# Patient Record
Sex: MALE | Race: WHITE | NOT HISPANIC OR LATINO | Employment: FULL TIME | URBAN - METROPOLITAN AREA
[De-identification: names, ages, dates, MRNs, and addresses within clinical notes are randomized per-mention and may not be internally consistent; named-entity substitution may affect disease eponyms.]

---

## 2017-02-18 ENCOUNTER — GENERIC CONVERSION - ENCOUNTER (OUTPATIENT)
Dept: OTHER | Facility: OTHER | Age: 57
End: 2017-02-18

## 2017-11-24 ENCOUNTER — GENERIC CONVERSION - ENCOUNTER (OUTPATIENT)
Dept: OTHER | Facility: OTHER | Age: 57
End: 2017-11-24

## 2018-01-10 NOTE — MISCELLANEOUS
Message  GI Reminder Recall [facility]:   Date: 11/24/2017   Dear Marshall Mosher:     Review of our records shows you are due for the following: Colonoscopy  Our records indicate that you are due at this time to have a follow-up examination for a colonoscopy  As you now, these tests are done to prevent colon cancer, a very common disease in the [place] and responsible for the thousands of patient deaths each year  We at [facility] are concerned for your health, and would very much appreciate you getting in touch with us at your earliest convenience, Again, this examination is vital to your proper health maintenance and for the prevention of cancer  Please call the following office to schedule your appointment:   [place] [id number], [facility], [address] [place] [address] [phone]  We look forward to hearing from you!      Sincerely,     [facility]      Signatures   Electronically signed by : Gomez Balderas, ; Nov 24 2017  1:55PM EST                       (Author)

## 2018-01-15 NOTE — MISCELLANEOUS
Message  GI Reminder Recall Jovani Dopp:   Date: 02/18/2017   Dear Mackenzie Ta:     Review of our records shows you are due for the following: Colonoscopy  Please call the following office to schedule your appointment:   [facility], [address], [address] [place] [address]  [phone]  We look forward to hearing from you!      Sincerely,     Dr Callum Langley   Electronically signed by : Abdulaziz Gross, ; Feb 18 2017 11:21AM EST                       (Author)

## 2019-07-18 ENCOUNTER — TRANSCRIBE ORDERS (OUTPATIENT)
Dept: ADMINISTRATIVE | Facility: HOSPITAL | Age: 59
End: 2019-07-18

## 2019-07-18 DIAGNOSIS — J98.4 DISEASE OF LUNG: Primary | ICD-10-CM

## 2019-07-20 ENCOUNTER — HOSPITAL ENCOUNTER (OUTPATIENT)
Dept: RADIOLOGY | Facility: HOSPITAL | Age: 59
Discharge: HOME | End: 2019-07-20
Payer: COMMERCIAL

## 2019-07-20 DIAGNOSIS — J98.4 DISEASE OF LUNG: ICD-10-CM

## 2019-07-25 ENCOUNTER — HOSPITAL ENCOUNTER (OUTPATIENT)
Dept: NON INVASIVE DIAGNOSTICS | Facility: HOSPITAL | Age: 59
Discharge: HOME | End: 2019-07-25
Payer: COMMERCIAL

## 2019-07-25 ENCOUNTER — TRANSCRIBE ORDERS (OUTPATIENT)
Dept: ADMINISTRATIVE | Facility: HOSPITAL | Age: 59
End: 2019-07-25

## 2019-07-25 DIAGNOSIS — R79.89 D-DIMER, ELEVATED: ICD-10-CM

## 2019-07-25 DIAGNOSIS — R91.8 PULMONARY NODULES: ICD-10-CM

## 2019-07-25 DIAGNOSIS — M31.30 WEGENER'S GRANULOMATOSIS WITHOUT RENAL INVOLVEMENT (HCC): ICD-10-CM

## 2019-07-25 DIAGNOSIS — M31.30 WEGENER'S GRANULOMATOSIS WITHOUT RENAL INVOLVEMENT (HCC): Primary | ICD-10-CM

## 2019-07-25 PROCEDURE — 93306 TTE W/DOPPLER COMPLETE: CPT

## 2019-07-25 PROCEDURE — 93306 TTE W/DOPPLER COMPLETE: CPT | Performed by: INTERNAL MEDICINE

## 2019-08-02 ENCOUNTER — HOSPITAL ENCOUNTER (OUTPATIENT)
Dept: NON INVASIVE DIAGNOSTICS | Facility: CLINIC | Age: 59
Discharge: HOME | End: 2019-08-02
Payer: COMMERCIAL

## 2019-08-02 DIAGNOSIS — R91.8 PULMONARY NODULES: ICD-10-CM

## 2019-08-02 DIAGNOSIS — R79.89 D-DIMER, ELEVATED: ICD-10-CM

## 2019-08-02 PROCEDURE — 93970 EXTREMITY STUDY: CPT

## 2019-08-02 PROCEDURE — 93970 EXTREMITY STUDY: CPT | Performed by: SURGERY

## 2019-08-05 ENCOUNTER — TRANSCRIBE ORDERS (OUTPATIENT)
Dept: ADMINISTRATIVE | Facility: HOSPITAL | Age: 59
End: 2019-08-05

## 2019-08-05 DIAGNOSIS — R91.8 LUNG NODULES: Primary | ICD-10-CM

## 2019-10-07 ENCOUNTER — HOSPITAL ENCOUNTER (OUTPATIENT)
Dept: RADIOLOGY | Facility: HOSPITAL | Age: 59
Discharge: HOME | End: 2019-10-07
Payer: COMMERCIAL

## 2019-10-07 DIAGNOSIS — R91.8 LUNG NODULES: ICD-10-CM

## 2019-10-07 PROCEDURE — 71250 CT THORAX DX C-: CPT

## 2020-01-30 ENCOUNTER — TRANSCRIBE ORDERS (OUTPATIENT)
Dept: ADMINISTRATIVE | Facility: HOSPITAL | Age: 60
End: 2020-01-30

## 2020-01-30 DIAGNOSIS — R91.1 PULMONARY NODULE: Primary | ICD-10-CM

## 2020-01-30 DIAGNOSIS — Z09 FOLLOW UP: ICD-10-CM

## 2020-07-06 ENCOUNTER — TRANSCRIBE ORDERS (OUTPATIENT)
Dept: ADMINISTRATIVE | Facility: HOSPITAL | Age: 60
End: 2020-07-06

## 2020-07-06 DIAGNOSIS — D14.30 BENIGN NEOPLASM OF BRONCHUS AND LUNG: Primary | ICD-10-CM

## 2020-07-06 DIAGNOSIS — K86.89 PANCREATIC FISTULA: ICD-10-CM

## 2020-07-10 ENCOUNTER — HOSPITAL ENCOUNTER (OUTPATIENT)
Dept: ULTRASOUND IMAGING | Facility: HOSPITAL | Age: 60
Discharge: HOME | End: 2020-07-10
Payer: COMMERCIAL

## 2020-07-10 ENCOUNTER — APPOINTMENT (OUTPATIENT)
Dept: CT IMAGING | Facility: HOSPITAL | Age: 60
End: 2020-07-10
Payer: COMMERCIAL

## 2020-07-10 DIAGNOSIS — K86.89 PANCREATIC FISTULA: ICD-10-CM

## 2020-07-10 DIAGNOSIS — R10.13 EPIGASTRIC PAIN: ICD-10-CM

## 2020-07-10 PROCEDURE — 76700 US EXAM ABDOM COMPLETE: CPT

## 2021-01-13 ENCOUNTER — OFFICE VISIT (OUTPATIENT)
Dept: FAMILY MEDICINE CLINIC | Facility: CLINIC | Age: 61
End: 2021-01-13
Payer: COMMERCIAL

## 2021-01-13 VITALS
DIASTOLIC BLOOD PRESSURE: 70 MMHG | TEMPERATURE: 97.1 F | SYSTOLIC BLOOD PRESSURE: 120 MMHG | RESPIRATION RATE: 14 BRPM | HEIGHT: 69 IN | HEART RATE: 76 BPM | BODY MASS INDEX: 29.62 KG/M2 | WEIGHT: 200 LBS

## 2021-01-13 DIAGNOSIS — R91.8 MULTIPLE PULMONARY NODULES: ICD-10-CM

## 2021-01-13 DIAGNOSIS — E11.9 TYPE 2 DIABETES MELLITUS WITHOUT COMPLICATION, WITH LONG-TERM CURRENT USE OF INSULIN (HCC): ICD-10-CM

## 2021-01-13 DIAGNOSIS — F17.209 TOBACCO USE DISORDER, CONTINUOUS: ICD-10-CM

## 2021-01-13 DIAGNOSIS — Z90.81 HISTORY OF PARTIAL SPLENECTOMY: ICD-10-CM

## 2021-01-13 DIAGNOSIS — M31.30 GRANULOMATOSIS WITH POLYANGIITIS, UNSPECIFIED WHETHER RENAL INVOLVEMENT (HCC): ICD-10-CM

## 2021-01-13 DIAGNOSIS — L02.93 RECURRENT BOILS: ICD-10-CM

## 2021-01-13 DIAGNOSIS — E66.3 OVERWEIGHT WITH BODY MASS INDEX (BMI) OF 29 TO 29.9 IN ADULT: ICD-10-CM

## 2021-01-13 DIAGNOSIS — Z79.4 TYPE 2 DIABETES MELLITUS WITHOUT COMPLICATION, WITH LONG-TERM CURRENT USE OF INSULIN (HCC): ICD-10-CM

## 2021-01-13 DIAGNOSIS — E78.5 HYPERLIPIDEMIA, UNSPECIFIED HYPERLIPIDEMIA TYPE: ICD-10-CM

## 2021-01-13 DIAGNOSIS — I10 ESSENTIAL HYPERTENSION: Primary | ICD-10-CM

## 2021-01-13 PROBLEM — L02.92 RECURRENT BOILS: Status: ACTIVE | Noted: 2021-01-13

## 2021-01-13 PROCEDURE — 99243 OFF/OP CNSLTJ NEW/EST LOW 30: CPT | Performed by: FAMILY MEDICINE

## 2021-01-13 RX ORDER — INSULIN GLARGINE 100 [IU]/ML
10 INJECTION, SOLUTION SUBCUTANEOUS DAILY
COMMUNITY
End: 2022-07-08 | Stop reason: ALTCHOICE

## 2021-01-13 RX ORDER — EMPAGLIFLOZIN 25 MG/1
25 TABLET, FILM COATED ORAL DAILY
COMMUNITY
Start: 2020-12-14 | End: 2021-01-13 | Stop reason: SDUPTHER

## 2021-01-13 RX ORDER — ATORVASTATIN CALCIUM 20 MG/1
20 TABLET, FILM COATED ORAL DAILY
COMMUNITY
Start: 2020-12-10 | End: 2021-01-13 | Stop reason: SDUPTHER

## 2021-01-13 RX ORDER — ATORVASTATIN CALCIUM 20 MG/1
20 TABLET, FILM COATED ORAL DAILY
Qty: 90 TABLET | Refills: 4 | Status: SHIPPED | OUTPATIENT
Start: 2021-01-13 | End: 2022-04-01 | Stop reason: SDUPTHER

## 2021-01-13 RX ORDER — LISINOPRIL 40 MG/1
TABLET ORAL DAILY
COMMUNITY
End: 2022-07-08 | Stop reason: SDUPTHER

## 2021-01-13 RX ORDER — AMLODIPINE BESYLATE 10 MG/1
TABLET ORAL
COMMUNITY
End: 2021-01-13 | Stop reason: SDUPTHER

## 2021-01-13 RX ORDER — METOPROLOL TARTRATE 50 MG/1
50 TABLET, FILM COATED ORAL DAILY
COMMUNITY
End: 2021-02-22 | Stop reason: SDUPTHER

## 2021-01-13 RX ORDER — AMLODIPINE BESYLATE 10 MG/1
10 TABLET ORAL DAILY
Qty: 90 TABLET | Refills: 1 | Status: SHIPPED | OUTPATIENT
Start: 2021-01-13 | End: 2021-07-19 | Stop reason: SDUPTHER

## 2021-01-13 RX ORDER — EMPAGLIFLOZIN 25 MG/1
25 TABLET, FILM COATED ORAL DAILY
Qty: 90 TABLET | Refills: 1 | Status: SHIPPED | OUTPATIENT
Start: 2021-01-13 | End: 2021-07-28 | Stop reason: SDUPTHER

## 2021-01-13 NOTE — ASSESSMENT & PLAN NOTE
Reports most recent A1c is less than 7, will work to obtain previous results from former provider  Given that he sounds to be well controlled and fasting blood sugars are in acceptable range will have him decrease Lantus from 30 units to 25 units in the morning to see if he maintains adequate glycemic control with lower insulin  If possible he would probably benefit from additional oral agents instead of being on insulin, especially since he is attempting to lose weight  Would consider adding a sulfonylurea to his regimen, further reduction in insulin, pending repeat  A1c

## 2021-01-13 NOTE — ASSESSMENT & PLAN NOTE
Patient unaware of this diagnosis there is listed on several diagnoses on imaging studies done in the last several years  Will work to obtain previous records and lab studies

## 2021-01-13 NOTE — ASSESSMENT & PLAN NOTE
Patient reports history of frequent skin boils, has had these since his teenage years  Recommended Hibiclens baths  Consideration of mupirocin for MRSA decolonization

## 2021-01-13 NOTE — ASSESSMENT & PLAN NOTE
Patient has history of splenectomy though a recent abdominal ultrasound showed spleen was present, likely a splenic remnant is it measures slightly smaller than a normal spleen size  Patient reports he has had all needed vaccinations  Will work to obtain records of these

## 2021-01-13 NOTE — PROGRESS NOTES
FAMILY MEDICINE NEW PATIENT     Date of Service: 21  Primary Care Provider:   Jenise Alvarez MD     Name: Kaylee Bañuelos       : 1960       Age:60 y o  Sex: male      MRN: [id number]      Chief Complaint:New Patient Visit     ASSESSMENT and PLAN:  Kaylee Bañuelos is a 56 y o  male here to establish care with:     Problem List Items Addressed This Visit        Endocrine    Type 2 diabetes mellitus, with long-term current use of insulin ([address] )     Reports most recent A1c is less than 7, will work to obtain previous results from former provider  Given that he sounds to be well controlled and fasting blood sugars are in acceptable range will have him decrease Lantus from 30 units to 25 units in the morning to see if he maintains adequate glycemic control with lower insulin  If possible he would probably benefit from additional oral agents instead of being on insulin, especially since he is attempting to lose weight  Would consider adding a sulfonylurea to his regimen, further reduction in insulin, pending repeat  A1c  Relevant Medications    insulin glargine (Lantus SoloStar) 100 units/mL injection pen    atorvastatin (LIPITOR) 20 mg tablet    Jardiance 25 MG TABS    metFORMIN (GLUCOPHAGE) 1000 MG tablet       Cardiovascular and Mediastinum    Granulomatosis with polyangiitis [facility])     Patient unaware of this diagnosis there is listed on several diagnoses on imaging studies done in the last several years  Will work to obtain previous records and lab studies           Essential hypertension - Primary     BP Readings from Last 3 Encounters:   21 120/70     Blood pressure is controlled on current regimen with amlodipine 10 mg, lisinopril 40 mg, metoprolol 50 mg (two 25 mg in the evening)          Relevant Medications    lisinopril (ZESTRIL) 40 mg tablet    metoprolol tartrate (LOPRESSOR) 50 mg tablet    amLODIPine (NORVASC) 10 mg tablet    atorvastatin (LIPITOR) 20 mg tablet       Other Multiple pulmonary nodules     Patient has significant smoking history, has yearly CT scans done of his lungs, most recent October 2019 with numerous nodules in the upper lobes and right middle and lower lobes  Nodules have some irregular margins with surrounding ground-glass haziness  Compared to previous CT scan some of the nodules have resolved well as have increased in size  It sounds like nodules have had a waxing and waning appearance  Will plan to repeat CT scan  Overweight with body mass index (BMI) of 29 to 29 9 in adult    Tobacco use disorder, continuous     Patient has significant smoking history, smoking at least a pack a day for the last 45+ years  He wants to quit but is not ready to quit at this time  His goals to try to work towards quitting smoking in 2021  History of partial splenectomy     Patient has history of splenectomy though a recent abdominal ultrasound showed spleen was present, likely a splenic remnant is it measures slightly smaller than a normal spleen size  Patient reports he has had all needed vaccinations  Will work to obtain records of these  Recurrent boils     Patient reports history of frequent skin boils, has had these since his teenage years  Recommended Hibiclens baths  Consideration of mupirocin for MRSA decolonization  Other Visit Diagnoses     Hyperlipidemia, unspecified hyperlipidemia type        Relevant Medications    atorvastatin (LIPITOR) 20 mg tablet           SUBJECTIVE:  Julianna Parks is a 56 y o  male who presents today with a chief complaint of New Patient Visit  HPI     Diabetes  He is currently on metformin 1000 mg twice daily, Lantus 30U daily in the morning, Jardiance 25 mg  He is on an ACE and statin  He reports that his A1C has been below 7, last was done in September or October  He reports that fasting blood sugars have been   He has not had recent symptomatic low blood sugars   He has had an A1C over 10 in the past  He was diagnosed at least 10 years ago  Hypertension  He is currently on amlodipine 10 mg, lisinopril 40 mg, metoprolol 50 mg daily  He does not check his blood pressure regularly at home  Tobacco Use  He smokes 1 + pack per day  He has been smoking for 48 years  He thinks about quitting  He has been getting yearly CT scans for lung cancer screening  He was seeing pulmonology in the past      Pulmonary Nodules  He denies history of Granulomatosis with polyangiitis, though this is listed on diagnosis for several imaging studies including CT scans of the chest  He has seen pulmonology in the past, provider retired  Most recent CT scan in October 2019 with following findings    Numerous nodules in the upper lobes and to a lesser extent in the right middle lobe and lower lobes  Many of the nodules demonstrate irregular margins with surrounding groundglass haziness and intermixed areas of streaky densities  Many of the nodules have resolved while others are new or have increased in size consistent with waxing and waning appearance  For example, there is a new cavitated nodule in the right upper lobe measuring 1 8 cm  There is a new non-cavitated nodule in the right upper lobe measuring 0 7 cm  Mild bronchiectasis is present  Constellation of findings are in keeping with provided history of Suejy's granulomatosis  He would like to got off of some of his medications  He has been working to loose weight  He reports that he is prone to boils, since he was a teenager  He has history of splenectomy as a child after a skiing accident, he states that he is up to date on vaccines  He did have abdominal US in July that showed a spleen was present measuring 8 9 x 7 8 x 2 4 cm  He had hand surgery after finger degloving on the right  He has history of squamous cell on right leg, he follows with dermatology with [facility]       Review of Systems   Constitutional: Negative for activity change, appetite change, chills, fever and unexpected weight change  HENT: Negative for congestion and sore throat  Eyes: Negative for visual disturbance  Respiratory: Negative for cough and shortness of breath  Cardiovascular: Negative for chest pain  Gastrointestinal: Negative for abdominal distention, constipation and diarrhea  Genitourinary: Negative for difficulty urinating  Musculoskeletal: Negative for arthralgias and back pain  Skin: Negative for pallor, rash and wound  Frequent skin boils   Neurological: Negative for dizziness, light-headedness and headaches  Psychiatric/Behavioral: Negative for confusion and dysphoric mood  I have reviewed the patient's PMH, Surgical History, Family History, Social History, Medication List and Allergies        Histories Reviewed and Updated 1/13/2021:  Patient's Medications   New Prescriptions    No medications on file   Previous Medications    GLUCOSE BLOOD TEST STRIP    OneTouch Verio test strips    INSULIN GLARGINE (LANTUS SOLOSTAR) 100 UNITS/ML INJECTION PEN    Inject 30 Units under the skin daily    LISINOPRIL (ZESTRIL) 40 MG TABLET    daily Taking 1/2 tablet daily (20mg)    METOPROLOL TARTRATE (LOPRESSOR) 50 MG TABLET    Take 50 mg by mouth daily   Modified Medications    Modified Medication Previous Medication    AMLODIPINE (NORVASC) 10 MG TABLET amLODIPine (NORVASC) 10 mg tablet       Take 1 tablet (10 mg total) by mouth daily    amlodipine 10 mg tablet    ATORVASTATIN (LIPITOR) 20 MG TABLET atorvastatin (LIPITOR) 20 mg tablet       Take 1 tablet (20 mg total) by mouth daily    Take 20 mg by mouth daily    JARDIANCE 25 MG TABS Jardiance 25 MG TABS       Take 1 tablet (25 mg total) by mouth daily    Take 25 mg by mouth daily    METFORMIN (GLUCOPHAGE) 1000 MG TABLET metFORMIN (GLUCOPHAGE) 1000 MG tablet       Take 1 tablet (1,000 mg total) by mouth 2 (two) times a day    2 (two) times a day   Discontinued Medications GLUCOSE BLOOD TEST STRIP    OneTouch Verio strips     Allergies   Allergen Reactions    Amoxicillin Rash    Amoxicillin-Pot Clavulanate Rash     Past Medical History:   Diagnosis Date    Diabetes mellitus ([address] )     Hyperlipidemia     Hypertension     Squamous cell carcinoma of leg, right      Past Surgical History:   Procedure Laterality Date    CHOLECYSTECTOMY      HAND TENDON SURGERY      SPLENECTOMY, PARTIAL  1968    accident    TONSILLECTOMY       Social History     Socioeconomic History    Marital status: /Civil Union     Spouse name: Not on file    Number of children: Not on file    Years of education: Not on file    Highest education level: Not on file   Occupational History    Not on file   Social Needs    Financial resource strain: Not on file    Food insecurity     Worry: Not on file     Inability: Not on file    Transportation needs     Medical: Not on file     Non-medical: Not on file   Tobacco Use    Smoking status: Current Every Day Smoker     Packs/day: 1 00     Years: 45 00     Pack years: 45 00     Types: Cigarettes    Smokeless tobacco: Never Used   Substance and Sexual Activity    Alcohol use: Yes     Frequency: 2-4 times a month     Drinks per session: 1 or 2    Drug use: Never    Sexual activity: Yes     Partners: Female   Lifestyle    Physical activity     Days per week: Not on file     Minutes per session: Not on file    Stress: Not on file   Relationships    Social connections     Talks on phone: Not on file     Gets together: Not on file     Attends [employer] service: Not on file     Active member of club or organization: Not on file     Attends meetings of clubs or organizations: Not on file     Relationship status: Not on file    Intimate partner violence     Fear of current or ex partner: Not on file     Emotionally abused: Not on file     Physically abused: Not on file     Forced sexual activity: Not on file   Other Topics Concern    Not on file Social History Narrative    Not on file     Family History   Problem Relation Age of Onset    Ovarian cancer Mother     Cancer Mother     Kidney disease Mother     Coronary artery disease Father     Diabetes Father     Cancer Brother      Immunization History   Administered Date(s) Administered    H1N1, All Formulations 11/02/2009    INFLUENZA 09/29/2020    Influenza, injectable, quadrivalent, preservative free 0 5 mL 09/29/2020    Meningococcal B, OMV (BEXSERO) 04/16/2019, 05/28/2019    Meningococcal MCV4P 04/16/2019    Pneumococcal Polysaccharide PPV23 05/28/2019, 06/19/2019     OBJECTIVE:  /70   Pulse 76   Temp (!) 97 1 °F (36 2 °C)   Resp 14   Ht 5' 9" (1 753 m)   Wt 90 7 kg (200 lb)   BMI 29 53 kg/m²   BP Readings from Last 3 Encounters:   01/13/21 120/70      Wt Readings from Last 3 Encounters:   01/13/21 90 7 kg (200 lb)      Physical Exam  Constitutional:       General: He is not in acute distress  Appearance: Normal appearance  He is obese  He is not ill-appearing or diaphoretic  HENT:      Head: Normocephalic and atraumatic  Right Ear: Tympanic membrane, ear canal and external ear normal       Left Ear: Tympanic membrane, ear canal and external ear normal       Nose: Nose normal       Mouth/Throat:      Mouth: Mucous membranes are moist    Eyes:      Extraocular Movements: Extraocular movements intact  Conjunctiva/sclera: Conjunctivae normal    Neck:      Musculoskeletal: Normal range of motion and neck supple  No neck rigidity  Cardiovascular:      Rate and Rhythm: Normal rate and regular rhythm  Pulses: Normal pulses  Heart sounds: Normal heart sounds  Pulmonary:      Effort: Pulmonary effort is normal  No respiratory distress  Breath sounds: Normal breath sounds  Abdominal:      General: There is no distension  Palpations: Abdomen is soft  Musculoskeletal: Normal range of motion  Right lower leg: No edema        Left lower leg: No edema  Skin:     General: Skin is warm and dry  Findings: No erythema or rash  Neurological:      General: No focal deficit present  Mental Status: He is alert  Gait: Gait normal    Psychiatric:         Mood and Affect: Mood normal          Behavior: Behavior normal          BMI Counseling: Body mass index is 29 53 kg/m²  The BMI is above normal  Nutrition recommendations include encouraging healthy choices of fruits and vegetables  Exercise recommendations include moderate physical activity 150 minutes/week  Tobacco Cessation Counseling: Tobacco cessation counseling was provided   The patient is sincerely urged to quit consumption of tobacco  He is not ready to quit tobacco        Johana Carter MD

## 2021-01-13 NOTE — ASSESSMENT & PLAN NOTE
BP Readings from Last 3 Encounters:   01/13/21 120/70     Blood pressure is controlled on current regimen with amlodipine 10 mg, lisinopril 40 mg, metoprolol 50 mg (two 25 mg in the evening)

## 2021-01-13 NOTE — ASSESSMENT & PLAN NOTE
Patient has significant smoking history, has yearly CT scans done of his lungs, most recent October 2019 with numerous nodules in the upper lobes and right middle and lower lobes  Nodules have some irregular margins with surrounding ground-glass haziness  Compared to previous CT scan some of the nodules have resolved well as have increased in size  It sounds like nodules have had a waxing and waning appearance  Will plan to repeat CT scan

## 2021-01-13 NOTE — ASSESSMENT & PLAN NOTE
Patient has significant smoking history, smoking at least a pack a day for the last 45+ years  He wants to quit but is not ready to quit at this time  His goals to try to work towards quitting smoking in 2021

## 2021-02-22 ENCOUNTER — PATIENT MESSAGE (OUTPATIENT)
Dept: FAMILY MEDICINE CLINIC | Facility: CLINIC | Age: 61
End: 2021-02-22

## 2021-02-22 DIAGNOSIS — E11.9 TYPE 2 DIABETES MELLITUS WITHOUT COMPLICATION, WITH LONG-TERM CURRENT USE OF INSULIN (HCC): ICD-10-CM

## 2021-02-22 DIAGNOSIS — I10 ESSENTIAL HYPERTENSION: Primary | ICD-10-CM

## 2021-02-22 DIAGNOSIS — Z79.4 TYPE 2 DIABETES MELLITUS WITHOUT COMPLICATION, WITH LONG-TERM CURRENT USE OF INSULIN (HCC): ICD-10-CM

## 2021-02-22 RX ORDER — METOPROLOL TARTRATE 50 MG/1
50 TABLET, FILM COATED ORAL DAILY
Qty: 90 TABLET | Refills: 0 | Status: SHIPPED | OUTPATIENT
Start: 2021-02-22 | End: 2021-05-03 | Stop reason: SDUPTHER

## 2021-02-25 ENCOUNTER — TELEPHONE (OUTPATIENT)
Dept: FAMILY MEDICINE CLINIC | Facility: CLINIC | Age: 61
End: 2021-02-25

## 2021-02-25 NOTE — TELEPHONE ENCOUNTER
----- Message from Edwar Drummond sent at 2021  5:49 PM EST -----  Regarding: Non-Urgent Medical Question  Contact: [phone]  I'm inquiring about updates regarding the covid 19 vaccine  My wife, Dylan Marquez  44, and I are both preregistered and I know that I am classified 1-C but my wife is 1-A, 76 + with COPD and lung cancer so my main concern is for her to get vaccinated ASAP  I would also like to get mine as soon as I am eligible  Thank you

## 2021-03-08 ENCOUNTER — PATIENT MESSAGE (OUTPATIENT)
Dept: FAMILY MEDICINE CLINIC | Facility: CLINIC | Age: 61
End: 2021-03-08

## 2021-03-10 DIAGNOSIS — Z23 ENCOUNTER FOR IMMUNIZATION: ICD-10-CM

## 2021-03-11 ENCOUNTER — TELEPHONE (OUTPATIENT)
Dept: FAMILY MEDICINE CLINIC | Facility: CLINIC | Age: 61
End: 2021-03-11

## 2021-03-11 NOTE — TELEPHONE ENCOUNTER
Spoke to patient  Informed him that he should receive another message when vaccines are available and to check his My Chart daily for more information

## 2021-03-11 NOTE — TELEPHONE ENCOUNTER
----- Message from Lalo Marc sent at 3/10/2021  7:09 PM EST -----  Regarding: Non-Urgent Medical Question  Contact: [phone]  I received a message that I can schedule my covid vaccine  First, MyChart wouldn't let me sign in  I finally got signed in and all vaccine times are filled  Do I need to wait for another message?

## 2021-03-20 ENCOUNTER — IMMUNIZATIONS (OUTPATIENT)
Dept: FAMILY MEDICINE CLINIC | Facility: HOSPITAL | Age: 61
End: 2021-03-20

## 2021-03-20 DIAGNOSIS — Z23 ENCOUNTER FOR IMMUNIZATION: Primary | ICD-10-CM

## 2021-03-20 PROCEDURE — 91300 SARS-COV-2 / COVID-19 MRNA VACCINE (PFIZER-BIONTECH) 30 MCG: CPT

## 2021-03-20 PROCEDURE — 0001A SARS-COV-2 / COVID-19 MRNA VACCINE (PFIZER-BIONTECH) 30 MCG: CPT

## 2021-03-26 ENCOUNTER — TELEPHONE (OUTPATIENT)
Dept: FAMILY MEDICINE CLINIC | Facility: CLINIC | Age: 61
End: 2021-03-26

## 2021-03-26 DIAGNOSIS — Z20.822 EXPOSURE TO COVID-19 VIRUS: ICD-10-CM

## 2021-03-26 DIAGNOSIS — Z20.822 EXPOSURE TO COVID-19 VIRUS: Primary | ICD-10-CM

## 2021-03-26 PROCEDURE — U0003 INFECTIOUS AGENT DETECTION BY NUCLEIC ACID (DNA OR RNA); SEVERE ACUTE RESPIRATORY SYNDROME CORONAVIRUS 2 (SARS-COV-2) (CORONAVIRUS DISEASE [COVID-19]), AMPLIFIED PROBE TECHNIQUE, MAKING USE OF HIGH THROUGHPUT TECHNOLOGIES AS DESCRIBED BY CMS-2020-01-R: HCPCS | Performed by: FAMILY MEDICINE

## 2021-03-26 PROCEDURE — U0005 INFEC AGEN DETEC AMPLI PROBE: HCPCS | Performed by: FAMILY MEDICINE

## 2021-03-26 NOTE — TELEPHONE ENCOUNTER
Patient got a call from a friend that was in her home last week who now tested positive  They were not wearing masks  Patient would like to be tested  No symptoms

## 2021-03-26 NOTE — TELEPHONE ENCOUNTER
Spoke to patient's wife,  Stated evelio  is at work, suggested that she call  to leave work to be tested   Was exposed on 3/18  Quarantine guidelines reviewed with patient's wife, who verbalized understanding  Order placed  Instructed patient's wife to check My Chart over weekend for results

## 2021-03-27 LAB — SARS-COV-2 RNA RESP QL NAA+PROBE: NEGATIVE

## 2021-04-10 ENCOUNTER — IMMUNIZATIONS (OUTPATIENT)
Dept: FAMILY MEDICINE CLINIC | Facility: HOSPITAL | Age: 61
End: 2021-04-10

## 2021-04-10 ENCOUNTER — TRANSCRIBE ORDERS (OUTPATIENT)
Dept: LAB | Facility: HOSPITAL | Age: 61
End: 2021-04-10

## 2021-04-10 ENCOUNTER — APPOINTMENT (OUTPATIENT)
Dept: LAB | Facility: HOSPITAL | Age: 61
End: 2021-04-10
Payer: COMMERCIAL

## 2021-04-10 DIAGNOSIS — E11.9 TYPE 2 DIABETES MELLITUS WITHOUT COMPLICATION, WITH LONG-TERM CURRENT USE OF INSULIN (HCC): ICD-10-CM

## 2021-04-10 DIAGNOSIS — Z79.4 TYPE 2 DIABETES MELLITUS WITHOUT COMPLICATION, WITH LONG-TERM CURRENT USE OF INSULIN (HCC): ICD-10-CM

## 2021-04-10 DIAGNOSIS — I10 ESSENTIAL HYPERTENSION: ICD-10-CM

## 2021-04-10 DIAGNOSIS — Z23 ENCOUNTER FOR IMMUNIZATION: Primary | ICD-10-CM

## 2021-04-10 LAB
ANION GAP SERPL CALCULATED.3IONS-SCNC: 8 MMOL/L (ref 4–13)
BUN SERPL-MCNC: 15 MG/DL (ref 6–20)
CALCIUM SERPL-MCNC: 9.5 MG/DL (ref 8.4–10.2)
CHLORIDE SERPL-SCNC: 104 MMOL/L (ref 96–108)
CHOLEST SERPL-MCNC: 94 MG/DL
CO2 SERPL-SCNC: 27 MMOL/L (ref 22–33)
CREAT SERPL-MCNC: 0.88 MG/DL (ref 0.5–1.2)
CREAT UR-MCNC: 73.3 MG/DL
EST. AVERAGE GLUCOSE BLD GHB EST-MCNC: 131 MG/DL
GFR SERPL CREATININE-BSD FRML MDRD: 91 ML/MIN/1.73SQ M
GLUCOSE P FAST SERPL-MCNC: 84 MG/DL (ref 70–105)
HBA1C MFR BLD: 6.2 %
HDLC SERPL-MCNC: 46 MG/DL
LDLC SERPL CALC-MCNC: 33 MG/DL (ref 0–100)
MICROALBUMIN UR-MCNC: 72.4 MG/L (ref 0–20)
MICROALBUMIN/CREAT 24H UR: 99 MG/G CREATININE (ref 0–30)
POTASSIUM SERPL-SCNC: 3.9 MMOL/L (ref 3.5–5)
SODIUM SERPL-SCNC: 139 MMOL/L (ref 133–145)
TRIGL SERPL-MCNC: 75.7 MG/DL

## 2021-04-10 PROCEDURE — 83036 HEMOGLOBIN GLYCOSYLATED A1C: CPT

## 2021-04-10 PROCEDURE — 36415 COLL VENOUS BLD VENIPUNCTURE: CPT

## 2021-04-10 PROCEDURE — 91300 SARS-COV-2 / COVID-19 MRNA VACCINE (PFIZER-BIONTECH) 30 MCG: CPT

## 2021-04-10 PROCEDURE — 80048 BASIC METABOLIC PNL TOTAL CA: CPT

## 2021-04-10 PROCEDURE — 82043 UR ALBUMIN QUANTITATIVE: CPT | Performed by: FAMILY MEDICINE

## 2021-04-10 PROCEDURE — 80061 LIPID PANEL: CPT

## 2021-04-10 PROCEDURE — 82570 ASSAY OF URINE CREATININE: CPT | Performed by: FAMILY MEDICINE

## 2021-04-10 PROCEDURE — 0002A SARS-COV-2 / COVID-19 MRNA VACCINE (PFIZER-BIONTECH) 30 MCG: CPT

## 2021-05-03 DIAGNOSIS — I10 ESSENTIAL HYPERTENSION: ICD-10-CM

## 2021-05-03 RX ORDER — METOPROLOL TARTRATE 50 MG/1
50 TABLET, FILM COATED ORAL DAILY
Qty: 90 TABLET | Refills: 3 | Status: SHIPPED | OUTPATIENT
Start: 2021-05-03 | End: 2022-04-01 | Stop reason: SDUPTHER

## 2021-05-05 ENCOUNTER — APPOINTMENT (EMERGENCY)
Dept: RADIOLOGY | Facility: HOSPITAL | Age: 61
End: 2021-05-05

## 2021-05-05 ENCOUNTER — ANESTHESIA (INPATIENT)
Dept: PERIOP | Facility: HOSPITAL | Age: 61
DRG: 489 | End: 2021-05-05
Payer: COMMERCIAL

## 2021-05-05 ENCOUNTER — ANESTHESIA EVENT (INPATIENT)
Dept: PERIOP | Facility: HOSPITAL | Age: 61
DRG: 489 | End: 2021-05-05
Payer: COMMERCIAL

## 2021-05-05 ENCOUNTER — HOSPITAL ENCOUNTER (EMERGENCY)
Facility: HOSPITAL | Age: 61
End: 2021-05-05
Attending: EMERGENCY MEDICINE | Admitting: EMERGENCY MEDICINE
Payer: COMMERCIAL

## 2021-05-05 ENCOUNTER — HOSPITAL ENCOUNTER (INPATIENT)
Facility: HOSPITAL | Age: 61
LOS: 4 days | Discharge: HOME | DRG: 489 | End: 2021-05-09
Attending: ORTHOPAEDIC SURGERY | Admitting: ORTHOPAEDIC SURGERY
Payer: COMMERCIAL

## 2021-05-05 VITALS
BODY MASS INDEX: 28.88 KG/M2 | DIASTOLIC BLOOD PRESSURE: 76 MMHG | SYSTOLIC BLOOD PRESSURE: 135 MMHG | HEIGHT: 69 IN | HEART RATE: 89 BPM | RESPIRATION RATE: 16 BRPM | OXYGEN SATURATION: 96 % | TEMPERATURE: 99.2 F | WEIGHT: 195 LBS

## 2021-05-05 DIAGNOSIS — I10 ESSENTIAL HYPERTENSION: ICD-10-CM

## 2021-05-05 DIAGNOSIS — M00.9 SEPTIC JOINT OF LEFT KNEE JOINT (HCC): Primary | ICD-10-CM

## 2021-05-05 DIAGNOSIS — E11.9 TYPE 2 DIABETES MELLITUS WITHOUT COMPLICATION, WITH LONG-TERM CURRENT USE OF INSULIN (HCC): ICD-10-CM

## 2021-05-05 DIAGNOSIS — Z79.4 TYPE 2 DIABETES MELLITUS WITHOUT COMPLICATION, WITH LONG-TERM CURRENT USE OF INSULIN (HCC): ICD-10-CM

## 2021-05-05 DIAGNOSIS — M00.9 PYOGENIC ARTHRITIS OF RIGHT KNEE JOINT, DUE TO UNSPECIFIED ORGANISM (HCC): Primary | ICD-10-CM

## 2021-05-05 PROBLEM — E78.5 HYPERLIPIDEMIA: Status: ACTIVE | Noted: 2021-05-05

## 2021-05-05 PROBLEM — F17.200 SMOKING: Status: ACTIVE | Noted: 2021-05-05

## 2021-05-05 PROBLEM — IMO0001 SMOKING: Status: ACTIVE | Noted: 2021-05-05

## 2021-05-05 LAB
ALBUMIN SERPL BCP-MCNC: 4.2 G/DL (ref 3.4–4.8)
ALP SERPL-CCNC: 61 U/L (ref 10–129)
ALT SERPL W P-5'-P-CCNC: 13 U/L (ref 5–63)
ANION GAP SERPL CALCULATED.3IONS-SCNC: 13 MMOL/L (ref 4–13)
APPEARANCE FLD: ABNORMAL
APPEARANCE FLD: ABNORMAL
AST SERPL W P-5'-P-CCNC: 16 U/L (ref 15–41)
BASOPHILS # BLD AUTO: 0.03 THOUSANDS/ΜL (ref 0–0.1)
BASOPHILS NFR BLD AUTO: 0 % (ref 0–1)
BILIRUB SERPL-MCNC: 0.92 MG/DL (ref 0.3–1.2)
BUN SERPL-MCNC: 12 MG/DL (ref 6–20)
CALCIUM SERPL-MCNC: 9.4 MG/DL (ref 8.4–10.2)
CHLORIDE SERPL-SCNC: 100 MMOL/L (ref 96–108)
CO2 SERPL-SCNC: 24 MMOL/L (ref 22–33)
COLOR FLD: ABNORMAL
COLOR FLD: ABNORMAL
CREAT SERPL-MCNC: 0.82 MG/DL (ref 0.5–1.2)
CRYSTALS SNV QL MICRO: NORMAL
EOSINOPHIL # BLD AUTO: 0 THOUSAND/ΜL (ref 0–0.61)
EOSINOPHIL NFR BLD AUTO: 0 % (ref 0–6)
ERYTHROCYTE [DISTWIDTH] IN BLOOD BY AUTOMATED COUNT: 15.9 % (ref 11.6–15.1)
GFR SERPL CREATININE-BSD FRML MDRD: 94 ML/MIN/1.73SQ M
GLUCOSE SERPL-MCNC: 133 MG/DL (ref 65–140)
GLUCOSE SERPL-MCNC: 148 MG/DL (ref 65–140)
GLUCOSE SERPL-MCNC: 99 MG/DL (ref 65–140)
HCT VFR BLD AUTO: 41.4 % (ref 36.5–49.3)
HGB BLD-MCNC: 14.2 G/DL (ref 12–17)
HISTIOCYTES NFR SNV MANUAL: 14 %
HISTIOCYTES NFR SNV MANUAL: 21 %
IMM GRANULOCYTES # BLD AUTO: 0.05 THOUSAND/UL (ref 0–0.2)
IMM GRANULOCYTES NFR BLD AUTO: 0 % (ref 0–2)
LYMPHOCYTES # BLD AUTO: 1.17 THOUSANDS/ΜL (ref 0.6–4.47)
LYMPHOCYTES NFR BLD AUTO: 7 % (ref 14–44)
MCH RBC QN AUTO: 27.4 PG (ref 26.8–34.3)
MCHC RBC AUTO-ENTMCNC: 34.3 G/DL (ref 31.4–37.4)
MCV RBC AUTO: 80 FL (ref 82–98)
MONOCYTES # BLD AUTO: 2.49 THOUSAND/ΜL (ref 0.17–1.22)
MONOCYTES NFR BLD AUTO: 16 % (ref 4–12)
MONOCYTES NFR SNV MANUAL: 4 %
NEUTROPHILS # BLD AUTO: 12.24 THOUSANDS/ΜL (ref 1.85–7.62)
NEUTROPHILS NFR SNV MANUAL: 75 %
NEUTROPHILS NFR SNV MANUAL: 86 %
NEUTS SEG NFR BLD AUTO: 77 % (ref 43–75)
PLATELET # BLD AUTO: 266 THOUSANDS/UL (ref 149–390)
PMV BLD AUTO: 9.9 FL (ref 8.9–12.7)
POTASSIUM SERPL-SCNC: 3.3 MMOL/L (ref 3.5–5)
PROT SERPL-MCNC: 7.4 G/DL (ref 6.4–8.3)
RBC # BLD AUTO: 5.18 MILLION/UL (ref 3.88–5.62)
RBC # SNV MANUAL: ABNORMAL /UL (ref 0–10)
SARS-COV-2 RNA RESP QL NAA+PROBE: NEGATIVE
SITE: ABNORMAL
SITE: ABNORMAL
SODIUM SERPL-SCNC: 137 MMOL/L (ref 133–145)
TOTAL CELLS COUNTED SPEC: 100
TOTAL CELLS COUNTED SPEC: 100
URATE SERPL-MCNC: 3.3 MG/DL (ref 3.4–8.7)
WBC # BLD AUTO: 15.98 THOUSAND/UL (ref 4.31–10.16)
WBC # FLD MANUAL: ABNORMAL /UL (ref 0–200)
WBC # FLD MANUAL: ABNORMAL /UL (ref 0–200)

## 2021-05-05 PROCEDURE — 85025 COMPLETE CBC W/AUTO DIFF WBC: CPT | Performed by: EMERGENCY MEDICINE

## 2021-05-05 PROCEDURE — 82948 REAGENT STRIP/BLOOD GLUCOSE: CPT

## 2021-05-05 PROCEDURE — 0SBC0ZZ EXCISION OF RIGHT KNEE JOINT, OPEN APPROACH: ICD-10-PCS | Performed by: ORTHOPAEDIC SURGERY

## 2021-05-05 PROCEDURE — 36415 COLL VENOUS BLD VENIPUNCTURE: CPT | Performed by: EMERGENCY MEDICINE

## 2021-05-05 PROCEDURE — 84550 ASSAY OF BLOOD/URIC ACID: CPT | Performed by: EMERGENCY MEDICINE

## 2021-05-05 PROCEDURE — 89051 BODY FLUID CELL COUNT: CPT | Performed by: ORTHOPAEDIC SURGERY

## 2021-05-05 PROCEDURE — 89051 BODY FLUID CELL COUNT: CPT | Performed by: EMERGENCY MEDICINE

## 2021-05-05 PROCEDURE — 89050 BODY FLUID CELL COUNT: CPT | Performed by: EMERGENCY MEDICINE

## 2021-05-05 PROCEDURE — 29871 ARTHRS KNEE SURG FOR INFCTJ: CPT | Performed by: PHYSICIAN ASSISTANT

## 2021-05-05 PROCEDURE — 73564 X-RAY EXAM KNEE 4 OR MORE: CPT

## 2021-05-05 PROCEDURE — 20610 DRAIN/INJ JOINT/BURSA W/O US: CPT | Performed by: EMERGENCY MEDICINE

## 2021-05-05 PROCEDURE — 89060 EXAM SYNOVIAL FLUID CRYSTALS: CPT | Performed by: EMERGENCY MEDICINE

## 2021-05-05 PROCEDURE — 0S9C00Z DRAINAGE OF RIGHT KNEE JOINT WITH DRAINAGE DEVICE, OPEN APPROACH: ICD-10-PCS | Performed by: ORTHOPAEDIC SURGERY

## 2021-05-05 PROCEDURE — 87205 SMEAR GRAM STAIN: CPT | Performed by: EMERGENCY MEDICINE

## 2021-05-05 PROCEDURE — 99242 OFF/OP CONSLTJ NEW/EST SF 20: CPT | Performed by: NURSE PRACTITIONER

## 2021-05-05 PROCEDURE — 99221 1ST HOSP IP/OBS SF/LOW 40: CPT | Performed by: ORTHOPAEDIC SURGERY

## 2021-05-05 PROCEDURE — 86618 LYME DISEASE ANTIBODY: CPT | Performed by: EMERGENCY MEDICINE

## 2021-05-05 PROCEDURE — 99285 EMERGENCY DEPT VISIT HI MDM: CPT

## 2021-05-05 PROCEDURE — 87635 SARS-COV-2 COVID-19 AMP PRB: CPT | Performed by: EMERGENCY MEDICINE

## 2021-05-05 PROCEDURE — 96365 THER/PROPH/DIAG IV INF INIT: CPT

## 2021-05-05 PROCEDURE — 99285 EMERGENCY DEPT VISIT HI MDM: CPT | Performed by: EMERGENCY MEDICINE

## 2021-05-05 PROCEDURE — 87205 SMEAR GRAM STAIN: CPT | Performed by: ORTHOPAEDIC SURGERY

## 2021-05-05 PROCEDURE — 29871 ARTHRS KNEE SURG FOR INFCTJ: CPT | Performed by: ORTHOPAEDIC SURGERY

## 2021-05-05 PROCEDURE — 87070 CULTURE OTHR SPECIMN AEROBIC: CPT | Performed by: EMERGENCY MEDICINE

## 2021-05-05 PROCEDURE — 80053 COMPREHEN METABOLIC PANEL: CPT | Performed by: EMERGENCY MEDICINE

## 2021-05-05 PROCEDURE — 96372 THER/PROPH/DIAG INJ SC/IM: CPT

## 2021-05-05 PROCEDURE — 87070 CULTURE OTHR SPECIMN AEROBIC: CPT | Performed by: ORTHOPAEDIC SURGERY

## 2021-05-05 RX ORDER — MAGNESIUM HYDROXIDE 1200 MG/15ML
LIQUID ORAL AS NEEDED
Status: DISCONTINUED | OUTPATIENT
Start: 2021-05-05 | End: 2021-05-05 | Stop reason: HOSPADM

## 2021-05-05 RX ORDER — METOPROLOL TARTRATE 50 MG/1
50 TABLET, FILM COATED ORAL DAILY
Status: DISCONTINUED | OUTPATIENT
Start: 2021-05-06 | End: 2021-05-09 | Stop reason: HOSPADM

## 2021-05-05 RX ORDER — SODIUM CHLORIDE, SODIUM LACTATE, POTASSIUM CHLORIDE, CALCIUM CHLORIDE 600; 310; 30; 20 MG/100ML; MG/100ML; MG/100ML; MG/100ML
75 INJECTION, SOLUTION INTRAVENOUS CONTINUOUS
Status: CANCELLED | OUTPATIENT
Start: 2021-05-05

## 2021-05-05 RX ORDER — FENTANYL CITRATE/PF 50 MCG/ML
50 SYRINGE (ML) INJECTION
Status: DISCONTINUED | OUTPATIENT
Start: 2021-05-05 | End: 2021-05-05 | Stop reason: HOSPADM

## 2021-05-05 RX ORDER — NICOTINE 21 MG/24HR
1 PATCH, TRANSDERMAL 24 HOURS TRANSDERMAL DAILY
Status: DISCONTINUED | OUTPATIENT
Start: 2021-05-06 | End: 2021-05-09 | Stop reason: HOSPADM

## 2021-05-05 RX ORDER — LIDOCAINE HYDROCHLORIDE 10 MG/ML
INJECTION, SOLUTION EPIDURAL; INFILTRATION; INTRACAUDAL; PERINEURAL AS NEEDED
Status: DISCONTINUED | OUTPATIENT
Start: 2021-05-05 | End: 2021-05-05

## 2021-05-05 RX ORDER — AMLODIPINE BESYLATE 10 MG/1
10 TABLET ORAL DAILY
Status: DISCONTINUED | OUTPATIENT
Start: 2021-05-06 | End: 2021-05-09 | Stop reason: HOSPADM

## 2021-05-05 RX ORDER — HYDROMORPHONE HCL/PF 1 MG/ML
0.5 SYRINGE (ML) INJECTION
Status: DISCONTINUED | OUTPATIENT
Start: 2021-05-05 | End: 2021-05-05 | Stop reason: HOSPADM

## 2021-05-05 RX ORDER — ONDANSETRON 2 MG/ML
4 INJECTION INTRAMUSCULAR; INTRAVENOUS ONCE AS NEEDED
Status: DISCONTINUED | OUTPATIENT
Start: 2021-05-05 | End: 2021-05-05 | Stop reason: HOSPADM

## 2021-05-05 RX ORDER — MEPERIDINE HYDROCHLORIDE 25 MG/ML
12.5 INJECTION INTRAMUSCULAR; INTRAVENOUS; SUBCUTANEOUS
Status: DISCONTINUED | OUTPATIENT
Start: 2021-05-05 | End: 2021-05-05 | Stop reason: HOSPADM

## 2021-05-05 RX ORDER — HYDROMORPHONE HCL/PF 1 MG/ML
SYRINGE (ML) INJECTION AS NEEDED
Status: DISCONTINUED | OUTPATIENT
Start: 2021-05-05 | End: 2021-05-05

## 2021-05-05 RX ORDER — SODIUM CHLORIDE, SODIUM LACTATE, POTASSIUM CHLORIDE, CALCIUM CHLORIDE 600; 310; 30; 20 MG/100ML; MG/100ML; MG/100ML; MG/100ML
75 INJECTION, SOLUTION INTRAVENOUS CONTINUOUS
Status: DISCONTINUED | OUTPATIENT
Start: 2021-05-05 | End: 2021-05-08

## 2021-05-05 RX ORDER — ONDANSETRON 2 MG/ML
INJECTION INTRAMUSCULAR; INTRAVENOUS AS NEEDED
Status: DISCONTINUED | OUTPATIENT
Start: 2021-05-05 | End: 2021-05-05

## 2021-05-05 RX ORDER — OXYCODONE HYDROCHLORIDE 10 MG/1
10 TABLET ORAL EVERY 4 HOURS PRN
Status: DISCONTINUED | OUTPATIENT
Start: 2021-05-05 | End: 2021-05-09 | Stop reason: HOSPADM

## 2021-05-05 RX ORDER — ACETAMINOPHEN 325 MG/1
650 TABLET ORAL EVERY 6 HOURS PRN
Status: DISCONTINUED | OUTPATIENT
Start: 2021-05-05 | End: 2021-05-09 | Stop reason: HOSPADM

## 2021-05-05 RX ORDER — MIDAZOLAM HYDROCHLORIDE 2 MG/2ML
INJECTION, SOLUTION INTRAMUSCULAR; INTRAVENOUS AS NEEDED
Status: DISCONTINUED | OUTPATIENT
Start: 2021-05-05 | End: 2021-05-05

## 2021-05-05 RX ORDER — PROMETHAZINE HYDROCHLORIDE 25 MG/ML
12.5 INJECTION, SOLUTION INTRAMUSCULAR; INTRAVENOUS ONCE AS NEEDED
Status: DISCONTINUED | OUTPATIENT
Start: 2021-05-05 | End: 2021-05-05 | Stop reason: HOSPADM

## 2021-05-05 RX ORDER — KETOROLAC TROMETHAMINE 30 MG/ML
30 INJECTION, SOLUTION INTRAMUSCULAR; INTRAVENOUS ONCE
Status: COMPLETED | OUTPATIENT
Start: 2021-05-05 | End: 2021-05-05

## 2021-05-05 RX ORDER — MORPHINE SULFATE 4 MG/ML
4 INJECTION, SOLUTION INTRAMUSCULAR; INTRAVENOUS ONCE
Status: COMPLETED | OUTPATIENT
Start: 2021-05-05 | End: 2021-05-05

## 2021-05-05 RX ORDER — FENTANYL CITRATE 50 UG/ML
INJECTION, SOLUTION INTRAMUSCULAR; INTRAVENOUS AS NEEDED
Status: DISCONTINUED | OUTPATIENT
Start: 2021-05-05 | End: 2021-05-05

## 2021-05-05 RX ORDER — SODIUM CHLORIDE, SODIUM LACTATE, POTASSIUM CHLORIDE, CALCIUM CHLORIDE 600; 310; 30; 20 MG/100ML; MG/100ML; MG/100ML; MG/100ML
125 INJECTION, SOLUTION INTRAVENOUS CONTINUOUS
Status: DISCONTINUED | OUTPATIENT
Start: 2021-05-05 | End: 2021-05-05

## 2021-05-05 RX ORDER — DEXAMETHASONE SODIUM PHOSPHATE 10 MG/ML
INJECTION, SOLUTION INTRAMUSCULAR; INTRAVENOUS AS NEEDED
Status: DISCONTINUED | OUTPATIENT
Start: 2021-05-05 | End: 2021-05-05

## 2021-05-05 RX ORDER — OXYCODONE HYDROCHLORIDE AND ACETAMINOPHEN 5; 325 MG/1; MG/1
1 TABLET ORAL EVERY 4 HOURS PRN
Status: DISCONTINUED | OUTPATIENT
Start: 2021-05-05 | End: 2021-05-09 | Stop reason: HOSPADM

## 2021-05-05 RX ORDER — PROPOFOL 10 MG/ML
INJECTION, EMULSION INTRAVENOUS AS NEEDED
Status: DISCONTINUED | OUTPATIENT
Start: 2021-05-05 | End: 2021-05-05

## 2021-05-05 RX ORDER — LISINOPRIL 20 MG/1
20 TABLET ORAL DAILY
Status: DISCONTINUED | OUTPATIENT
Start: 2021-05-06 | End: 2021-05-09 | Stop reason: HOSPADM

## 2021-05-05 RX ORDER — SODIUM CHLORIDE 9 MG/ML
INJECTION, SOLUTION INTRAVENOUS CONTINUOUS PRN
Status: DISCONTINUED | OUTPATIENT
Start: 2021-05-05 | End: 2021-05-05

## 2021-05-05 RX ORDER — ONDANSETRON 2 MG/ML
4 INJECTION INTRAMUSCULAR; INTRAVENOUS EVERY 6 HOURS PRN
Status: DISCONTINUED | OUTPATIENT
Start: 2021-05-05 | End: 2021-05-09 | Stop reason: HOSPADM

## 2021-05-05 RX ORDER — ATORVASTATIN CALCIUM 20 MG/1
20 TABLET, FILM COATED ORAL DAILY
Status: DISCONTINUED | OUTPATIENT
Start: 2021-05-06 | End: 2021-05-09 | Stop reason: HOSPADM

## 2021-05-05 RX ADMIN — FENTANYL CITRATE 50 MCG: 50 INJECTION, SOLUTION INTRAMUSCULAR; INTRAVENOUS at 18:29

## 2021-05-05 RX ADMIN — SODIUM CHLORIDE, SODIUM LACTATE, POTASSIUM CHLORIDE, AND CALCIUM CHLORIDE 75 ML/HR: .6; .31; .03; .02 INJECTION, SOLUTION INTRAVENOUS at 20:46

## 2021-05-05 RX ADMIN — KETOROLAC TROMETHAMINE 30 MG: 30 INJECTION, SOLUTION INTRAMUSCULAR at 08:11

## 2021-05-05 RX ADMIN — ONDANSETRON 4 MG: 2 INJECTION INTRAMUSCULAR; INTRAVENOUS at 18:19

## 2021-05-05 RX ADMIN — LIDOCAINE HYDROCHLORIDE 50 MG: 10 INJECTION, SOLUTION EPIDURAL; INFILTRATION; INTRACAUDAL; PERINEURAL at 18:15

## 2021-05-05 RX ADMIN — PROPOFOL 200 MG: 10 INJECTION, EMULSION INTRAVENOUS at 18:15

## 2021-05-05 RX ADMIN — SODIUM CHLORIDE: 0.9 INJECTION, SOLUTION INTRAVENOUS at 18:07

## 2021-05-05 RX ADMIN — FENTANYL CITRATE 50 MCG: 50 INJECTION, SOLUTION INTRAMUSCULAR; INTRAVENOUS at 18:15

## 2021-05-05 RX ADMIN — VANCOMYCIN HYDROCHLORIDE 1250 MG: 1.25 INJECTION, POWDER, LYOPHILIZED, FOR SOLUTION INTRAVENOUS at 16:02

## 2021-05-05 RX ADMIN — MORPHINE SULFATE 4 MG: 4 INJECTION INTRAVENOUS at 09:43

## 2021-05-05 RX ADMIN — HYDROMORPHONE HYDROCHLORIDE 1 MG: 1 INJECTION, SOLUTION INTRAMUSCULAR; INTRAVENOUS; SUBCUTANEOUS at 18:32

## 2021-05-05 RX ADMIN — MIDAZOLAM 2 MG: 1 INJECTION INTRAMUSCULAR; INTRAVENOUS at 18:08

## 2021-05-05 RX ADMIN — DEXAMETHASONE SODIUM PHOSPHATE 4 MG: 10 INJECTION, SOLUTION INTRAMUSCULAR; INTRAVENOUS at 18:17

## 2021-05-05 NOTE — ED NOTES
Report given to nurse Nader VILLAFUERTE-OR Dr Dimitry Aggarwal [phone]     Mayito Rivers RN  05/05/21 [id number]

## 2021-05-05 NOTE — ANESTHESIA PREPROCEDURE EVALUATION
Procedure:  DEBRIDEMENT LOWER EXTREMITY [facility] OUT) Right knee  (Right Leg Lower)    Relevant Problems   CARDIO   (+) Essential hypertension   (+) Hyperlipidemia      ENDO   (+) Type 2 diabetes mellitus, with long-term current use of insulin (HCC)      PULMONARY   (+) Smoking      Other   (+) History of partial splenectomy        Physical Exam    Airway    Mallampati score: II  TM Distance: >3 FB  Neck ROM: full     Dental   upper dentures and lower dentures,     Cardiovascular  Rhythm: regular, Rate: normal,     Pulmonary  Breath sounds clear to auscultation,     Other Findings        Anesthesia Plan  ASA Score- 2 Emergent    Anesthesia Type- general with ASA Monitors  Additional Monitors:   Airway Plan: LMA  Plan Factors-    Chart reviewed  Patient is a current smoker  Induction- intravenous  Postoperative Plan- Plan for postoperative opioid use  Informed Consent- Anesthetic plan and risks discussed with patient

## 2021-05-05 NOTE — H&P
H&P Exam - Cristina Roblero 59 y o  male MRN: [id number]    Unit/Bed#: [address] [place] [address] Encounter: [id number]    Assessment:  · Right knee pain x 24 hours  Right knee XR: Right knee arthritis, small effusion, chondrocalcinosis and suspected loose body  Synovial fluid aspiration showing 89,00 WBC, and dark michael color  Leukocytosis: 15 98   · IDDM:  Hemoglobin A1c: 6 2%    Plan:  OR urgently for right knee washout  Consent will be obtained by Dr Ronak Pineda  Covid-19 negative  Chlorhexidine wipes on-call to OR  Preprocedure antibiotics:  Clindamycin  Care coordinate with anesthesia and OR staff  Subcutaneous insulin siding scale  History of Present Illness   This is a 77-year-old male past medical history IDDM, hyperlipidemia, hypertension presenting with 1 day of right knee pain  Right knee pain started while at work  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  Right knee pain has gotten progressively worse in the last 12 hours  Pain made worse with flexion extension and weight bearing  Patient denies any injury trauma accident to his right knee  Denies any previous surgeries of right knee  P Patient denies any numbness, tingling, loss of sensation  Review of Systems   Constitutional: Negative for chills, fatigue and fever  HENT: Negative for congestion, ear pain, hearing loss, postnasal drip, sinus pressure, sinus pain and sore throat  Eyes: Negative for pain and discharge  Respiratory: Negative for chest tightness and shortness of breath  Cardiovascular: Negative for chest pain  Gastrointestinal: Negative for abdominal pain, constipation, nausea and vomiting  Genitourinary: Negative for difficulty urinating  Musculoskeletal: Positive for arthralgias  Negative for myalgias  Skin: Negative for rash  Neurological: Negative for dizziness and headaches  Psychiatric/Behavioral: Negative for behavioral problems         Historical Information   Past Medical History:   Diagnosis Date    Diabetes mellitus ([facility] [address] )     Hyperlipidemia     Hypertension     Squamous cell carcinoma of leg, right      Past Surgical History:   Procedure Laterality Date    CHOLECYSTECTOMY      HAND TENDON SURGERY      SPLENECTOMY, PARTIAL  1968    accident    TONSILLECTOMY       Social History   Social History     Substance and Sexual Activity   Alcohol Use Yes    Frequency: 2-4 times a month    Drinks per session: 1 or 2     Social History     Substance and Sexual Activity   Drug Use Never     Social History     Tobacco Use   Smoking Status Current Every Day Smoker    Packs/day: 1 00    Years: 45 00    Pack years: 45 00    Types: Cigarettes   Smokeless Tobacco Never Used     E-Cigarette Use: Never User     E-Cigarette/Vaping Substances       Family History: non-contributory    Meds/Allergies   all medications and allergies reviewed  Allergies   Allergen Reactions    Amoxicillin Rash    Amoxicillin-Pot Clavulanate Rash       Objective   First Vitals:        Current Vitals:        No intake or output data in the 24 hours ending 05/05/21 1717    Invasive Devices     Peripheral Intravenous Line            Peripheral IV 05/05/21 Right Forearm less than 1 day                Physical Exam  Vitals signs and nursing note reviewed  HENT:      Head: Normocephalic and atraumatic  Cardiovascular:      Rate and Rhythm: Normal rate and regular rhythm  Pulses: Normal pulses  Pulmonary:      Effort: Pulmonary effort is normal       Breath sounds: Normal breath sounds  Musculoskeletal:      Right knee: He exhibits decreased range of motion, swelling and erythema  He exhibits no deformity and normal meniscus  Tenderness found  Medial joint line and lateral joint line tenderness noted  Skin:     General: Skin is warm  Capillary Refill: Capillary refill takes less than 2 seconds  Neurological:      Mental Status: He is alert and oriented to person, place, and time     Psychiatric: Mood and Affect: Mood normal          Behavior: Behavior normal          Lab Results:     Component Value Units   Sodium 137 mmol/L   Potassium 3 3 mmol/L   Chloride 100 mmol/L   CO2 24 mmol/L   Anion Gap 13 mmol/L   BUN 12 mg/dL   Creatinine 0 82  mg/dL   Glucose, Random 253254 mg/dL and diabetes as or equal to 123 mg/dL  Specimen collection should occur prior to Sulfasalazine administration due to the potential for falsely depressed results  Specimen collection should occur prior to Sulfapyridine administration due to the potential for falsely elevated results " class="icn [email or web address]" role="img" style="background-position: -545px -34px; vertical-align: text-bottom;" aria-label="If the patient is fasting, the ADA then defines impaired fasting glucose as 100 mg/dL and diabetes as or equal to 123 mg/dL  Specimen collection should occur prior to Sulfasalazine administration due to the potential for falsely depressed results  Specimen collection should occur prior to Sulfapyridine administration due to the potential for falsely elevated results " __imageClass="[email or web address]" alt="If the patient is fasting, the ADA then defines impaired fasting glucose as 100 mg/dL and diabetes as or equal to 123 mg/dL  Specimen collection should occur prior to Sulfasalazine administration due to the potential for falsely depressed results   Specimen collection should occur prior to Sulfapyridine administration due to the potential for falsely elevated results " mg/dL   Calcium 9 4 mg/dL   AST 16  U/L   ALT 13  U/L   Alkaline Phosphatase 61 0 U/L   Total Protein 7 4 g/dL   Albumin 4 2 g/dL   TOTAL BILIRUBIN 0 92 mg/dL   eGFR 94 ml/min/1 73sq m       Component Value Units   WBC 15 98 Thousand/uL   Red Blood Cell Count 5 18 Million/uL   Hemoglobin 14 2 g/dL   HCT 41 4 %   MCV 80 fL   MCH 27 4 pg   MCHC 34 3 g/dL   RDW 15 9 %   MPV 9 9 fL   Platelet Count 864 Thousands/uL   Neutrophils % 77 %   Immat GRANS % 0 %   Lymphocytes Relative 7 %   Monocytes Relative 16 %   Eosinophils 0 %   Basophils Relative 0 %   Absolute Neutrophils 12 24 Thousands/µL   Immature Grans Absolute 0 05 Thousand/uL   Lymphocytes Absolute 1 17 Thousands/µL   Absolute Monocytes 2 49 Thousand/µL   Absolute Eosinophils 0 00 Thousand/µL   Basophils Absolute 0 03 Thousands/µL     Component Value Units Site right knee Color, Fluid Dark AmberClarity, Fluid CloudyWBC, Fluid 89,000/ul      Imaging:   Procedure Component Value Units Date/Time XR knee 4+ vw right injury [[id number]] Collected: 05/05/21 1113 Order Status: Completed Updated: 05/05/21 1118 Narrative: RIGHT KNEE     INDICATION: Trauma  Right knee pain, edema     COMPARISON: None     VIEWS: XR KNEE 4+ VW RIGHT INJURY       FINDINGS:     There is no acute fracture or dislocation  There is a small joint effusion  Mild right knee osteoarthritis, mild narrowing of the medial joint compartment     No lytic or blastic osseous lesion  Chondrocalcinosis is present  There is suspicion of a loose body along the posterior joint line measuring 10 mm   Impression:   Right knee arthritis, small effusion, chondrocalcinosis and suspected loose body     No acute osseous abnormality     The examination demonstrates a significant finding and was documented as such in [facility] for liaison and referring practitioner notification  Workstation performed: [id number]         Code Status: Level 1 - Full Code  Advance Directive and Living Will:      Power of :    POLST:      Counseling / Coordination of Care: Total floor / unit time spent today 60 minutes  and Greater than 50% of total time was spent with the patient and / or family counseling and / or coordination of care  A description of the counseling / coordination of care: Attending history, performing physical exam, reviewing pertinent labs, discussing case with attending

## 2021-05-05 NOTE — ED PROVIDER NOTES
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 Taking?    Jardiance 25 MG TABS   No No   Sig: Take 1 tablet (25 mg total) by mouth daily   amLODIPine (NORVASC) 10 mg tablet   No No   Sig: Take 1 tablet (10 mg total) by mouth daily   atorvastatin (LIPITOR) 20 mg tablet   No No   Sig: Take 1 tablet (20 mg total) by mouth daily   glucose blood test strip  Self Yes No   Sig: OneTouch Verio test strips   insulin glargine (Lantus SoloStar) 100 units/mL injection pen  Self Yes No   Sig: Inject 30 Units under the skin daily   lisinopril (ZESTRIL) 40 mg tablet  Self Yes No   Sig: daily Taking 1/2 tablet daily (20mg)   metFORMIN (GLUCOPHAGE) 1000 MG tablet   No No   Sig: Take 1 tablet (1,000 mg total) by mouth 2 (two) times a day   metoprolol tartrate (LOPRESSOR) 50 mg tablet   No No   Sig: Take 1 tablet (50 mg total) by mouth daily      Facility-Administered Medications: None       Past Medical History:   Diagnosis Date    Diabetes mellitus ([facility] [address] )     Hyperlipidemia     Hypertension     Squamous cell carcinoma of leg, right        Past Surgical History:   Procedure Laterality Date    CHOLECYSTECTOMY      HAND TENDON SURGERY      SPLENECTOMY, PARTIAL  1968    accident   [place] TONSILLECTOMY         Family History   Problem Relation Age of Onset    Ovarian cancer Mother     Cancer Mother     Kidney disease Mother     Coronary artery disease Father     Diabetes Father     Cancer Brother      I have reviewed and agree with the history as documented  E-Cigarette/Vaping    E-Cigarette Use Never User      E-Cigarette/Vaping Substances     Social History     Tobacco Use    Smoking status: Current Every Day Smoker     Packs/day: 1 00     Years: 45 00     Pack years: 45 00     Types: Cigarettes    Smokeless tobacco: Never Used   Substance Use Topics    Alcohol use: Yes     Frequency: 2-4 times a month     Drinks per session: 1 or 2    Drug use: Never       Review of Systems   Constitutional: Negative for chills and fever  Neurological: Negative for weakness and numbness  All other systems reviewed and are negative  Physical Exam  Physical Exam  Vitals signs reviewed  Constitutional:       General: He is not in acute distress  Appearance: He is not toxic-appearing  HENT:      Head: Normocephalic and atraumatic  Nose: Nose normal       Mouth/Throat:      Mouth: Mucous membranes are moist    Eyes:      General:         Right eye: No discharge  Left eye: No discharge  Conjunctiva/sclera: Conjunctivae normal    Neck:      Musculoskeletal: Normal range of motion and neck supple  No neck rigidity  Cardiovascular:      Rate and Rhythm: Normal rate and regular rhythm  Pulses: Normal pulses  Heart sounds: Normal heart sounds  No murmur  No friction rub  No gallop  Pulmonary:      Effort: Pulmonary effort is normal  No respiratory distress  Breath sounds: Normal breath sounds  No stridor  No wheezing, rhonchi or rales  Abdominal:      General: Bowel sounds are normal  There is no distension  Palpations: Abdomen is soft  Tenderness: There is no abdominal tenderness  There is no right CVA tenderness, left CVA tenderness, guarding or rebound  Musculoskeletal:         General: Swelling and tenderness present  No deformity  Right lower leg: No edema  Left lower leg: No edema  Comments: Neurovascular exam is normal   No calf pain  There is right suprapatellar swelling and tenderness  There is no erythema or significant warmth  There is no real joint effusion  No rash  Skin:     General: Skin is warm and dry  Coloration: Skin is not jaundiced or pale  Findings: No bruising, erythema or rash  Neurological:      General: No focal deficit present  Mental Status: He is alert and oriented to person, place, and time  Cranial Nerves: No facial asymmetry  Sensory: No sensory deficit  Motor: Motor function is intact     Psychiatric:         Mood and Affect: Mood normal          Behavior: Behavior normal          Vital Signs  ED Triage Vitals [05/05/21 0752]   Temperature Pulse Respirations Blood Pressure SpO2   98 °F (36 7 °C) 80 16 130/74 99 %      Temp Source Heart Rate Source Patient Position - Orthostatic VS BP Location FiO2 (%)   Oral Monitor Lying Left arm --      Pain Score       Worst Possible Pain           Vitals:    05/05/21 0752   BP: 130/74   Pulse: 80   Patient Position - Orthostatic VS: Lying         Visual Acuity      ED Medications  Medications   ketorolac (TORADOL) injection 30 mg (30 mg Intramuscular Given 5/5/21 0811)       Diagnostic Studies  Results Reviewed     Procedure Component Value Units Date/Time    CBC and differential [[id number]]     Lab Status: No result Specimen: Blood     Uric acid [[id number]]     Lab Status: No result Specimen: Blood                  XR knee 4+ views left injury    (Results Pending)              Procedures  Arthrocentesis    Date/Time: 5/5/2021 9:53 AM  Performed by: Darío Yang MD  Authorized by: Darío Yang MD     Location:  ED  Consent given by:  Patient  Patient identity confirmed:  Verbally with patient  Indications:  Joint swelling, pain, possible septic joint, diagnostic evaluation and therapeutic  Body area:  Knee  Joint:  Left knee  Local anesthesia used?: Yes    Anesthesia:  Local infiltration  Local anesthetic:  Lidocaine 1% with epinephrine  Anesthetic total (ml):  2  Preparation: Patient was prepped and draped in usual sterile fashion    Needle size:  18 G  Approach:  Lateral  Aspirate amount (ml):  20  Aspirate:  Bloody and cloudy  Patient tolerance:  Patient tolerated the procedure well with no immediate complications             ED Course                                           MDM  Number of Diagnoses or Management Options  Diagnosis management comments: Initially seem like traumatic  Initial differential diagnosis included suprapatellar bursitis, septic joint, meniscal injury, ligamentous injury, gout, Lyme and arthritis  Patient was very tender on examination and could barely move his knee  Uric acid was low  White blood cell count was elevated  Patient was afebrile  Patient has a history of diabetes  Decided to do arthrocentesis to rule out septic joint  Cloudy bloody fluid was returned  Red blood cell count was greater than 50,000  White blood cell count was greater than 89,000 with a preponderance of polys  Consulted with Orthopedics on-call  Agreed that this was a septic joint  Recommended vancomycin  No operating room here  Patient will be transferred to [address] [place] [address] to the operating room for a washout  Amount and/or Complexity of Data Reviewed  Clinical lab tests: ordered and reviewed  Tests in the radiology section of CPT®: ordered and reviewed  Discuss the patient with other providers: yes        Disposition  Final diagnoses:   None     ED Disposition     None      Follow-up Information    None         Patient's Medications   Discharge Prescriptions    No medications on file     No discharge procedures on file      PDMP Review None          ED Provider  Electronically Signed by           Diane Bowen MD  05/06/21 8406

## 2021-05-05 NOTE — OP NOTE
OPERATIVE REPORT  PATIENT NAME: Sara Mcgowan    :  1960  MRN: [id number]  Pt Location: [facility] OR ROOM 03    SURGERY DATE: 2021    Surgeon(s) and Role:     * Corrie Harman MD - Primary     * Cathleen Herron PA-C - Assisting necessary for the procedure for improvement of visualization due to the minimally invasive techniques utilized for arthroscopic irrigation debridement  Assistance was necessary for placement of the leg appropriately in space was assistance with utilization of camera and shaver  A physician assistant who is experienced and qualified for this particular assistance is necessary for this procedure  Preop Diagnosis:  Septic arthritis of right knee joint, due to unspecified organism ([address] ) [M00 9]    Post-Op Diagnosis Codes:     * septic arthritis of right knee joint, due to unspecified organism ([facility] [address] ) [M00 9]    Procedure(s) (LRB):  RIGHT KNEE ARTHROSCOPY W/IRRIGATION AND DEBRIDEMENT OF SEPTIC ARTHRITIS (Right)    Specimen(s):  ID Type Source Tests Collected by Time Destination   1 :  Synovial Fluid Joint, Right Knee SYNOVIAL FLUID WHITE CELL COUNT W/ DIFF, BODY FLUID CULTURE AND GRAM STAIN Corrie Harman MD 2021 1849        Estimated Blood Loss:   Minimal    Drains:  Closed/Suction Drain Right Leg Bulb 10 Fr  (Active)   Number of days: 0       Anesthesia Type:   General    Operative Indications:  Pyogenic arthritis of right knee joint, due to unspecified organism [facility]) [M00 9]  Jasper Roberto is a 68-year-old male who is a diabetic who unfortunately began having severe knee pain last night into today  He is not certain exactly what happened and he has not had a fever but he has had significant disability with trying to walk and severe pain that is made better with only rest   He did have an aspiration performed in the emergency department demonstrating a very high white count of over 80,000 in the aspirate which was cloudy and yellow    He also had a white blood cell count done on his CBC with demonstrated a level greater than 15  On examination he had severe pain with any range of motion about the right knee with a large effusion and warmth about the right knee  He understood the risks and benefits of right knee arthroscopic washout with irrigation and debridement and wished to go ahead  The risks are inclusive of but not limited to persistent infection, stiffness, worsening of symptoms, failure to regain full strength and ability, need for further washouts including possible open washout, blood clots, medical problems perioperatively, and need for additional surgeries  Operative Findings:  Right knee exam under anesthesia demonstrated good stability although very decreased range of motion noted from -5 degrees of extension to 90° of flexion  A large effusion was found and we did aspirate approximately 20 cc of rancho pus from the right knee at the beginning of the procedure  We sent this off as specimen  He intra-articularly had severe arthritic change throughout the knee in all 3 compartments but no obvious loose bodies but a significant amount of synovitis that was found on diagnostic arthroscopy  The ACL was intact  Medial lateral menisci were grossly intact with only some minor fraying noted and chondrocalcinosis noted  We did perform a very extensive irrigation debridement with over 4000 cc of fluid  We also utilized the shaver and felt that we had a good appearance of the knee at the end of the procedure  We did place a drain as well to assist in eradication of the infection  Complications:   None    Procedure and Technique:  Cesar mares was taken to the operating room and placed supine on the OR table  He had just finished his dose of IV vancomycin that was started in the emergency department  This was started after he had the joint aspirated    General anesthesia was induced and the right lower extremity was prepped and draped in usual sterile fashion after examination under anesthesia demonstrated good stability to ligamentous structures in all planes although he did have limited range of motion noted  We did take a surgical time-out  We then created the anterolateral portal and then aspirated from that portal approximately 20 cc of purulent fluid  We sent this off as additional specimen for Gram stain and culture and cell count  We then placed the arthroscope in the joint and then created an anteromedial portal with 11 blade  Diagnostic arthroscopy revealed significant arthritic change that was quite severe throughout the knee in all 3 compartments with no loose bodies in either gutter and significant synovitis found throughout the knee  We also demonstrated intact ACL and chondrocalcinosis in both compartments with minor fraying of both menisci but no significant tears of either meniscus  We then began our debridement with use of a shaver as there was chondrocalcinosis that was able to be debrided with the shaver as well as the frayed edges of the meniscus  We irrigated and debrided with over 4000 cc of fluid  We were pleased with irrigating debriding all 3 compartments as well as the gutters quite extensively  We then under arthroscopic guidance inserted the Kaveh-Davila drain via the anteromedial portal   We did view this in the joint  We then removed the arthroscopic equipment and closed the portals with 4-0 nylon suture but did not suture in the drain  We did place dry, sterile dressings with an Ace bandage and tape over the drain along the Ace bandage to protect the skin and keep the drain in place  The drain was working well at the end of the procedure and draining  He tolerated procedure well and transferred to recovery room stable condition  He will be admitted and be receiving IV antibiotics  We are consulting the hospitalist for diabetes management and medical management and also Infectious Disease for antibiotic treatment of this septic arthritis    Tiger text notification was sent to both of these medical teams     I was present for the entire procedure and A qualified resident physician was not available    Patient Disposition:  PACU     SIGNATURE: Flora De Jesus MD  DATE: May 5, 2021  TIME: 6:59 PM

## 2021-05-05 NOTE — EMTALA/ACUTE CARE TRANSFER
Formerly [facility] EMERGENCY DEPARTMENT  [address] [facility] [address]  Dept: [phone]      [facility] TRANSFER CONSENT    NAME Jeanette Pineda                                         1960                              MRN [id number]    I have been informed of my rights regarding examination, treatment, and transfer   by Dr Jose Angeles MD    Benefits: Specialized equipment and/or services available at the receiving facility (Include comment)________________________(Operating room, orthopedics)    Risks: Potential for delay in receiving treatment, Potential deterioration of medical condition, Loss of IV, Increased discomfort during transfer, Possible worsening of condition or death during transfer      Consent for Transfer:  I acknowledge that my medical condition has been evaluated and explained to me by the emergency department physician or other qualified medical person and/or my attending physician, who has recommended that I be transferred to the service of  Accepting Physician: Rashel Rosas at [address] Name, [address] : [facility]  The above potential benefits of such transfer, the potential risks associated with such transfer, and the probable risks of not being transferred have been explained to me, and I fully understand them  The doctor has explained that, in my case, the benefits of transfer outweigh the risks  I agree to be transferred  I authorize the performance of emergency medical procedures and treatments upon me in both transit and upon arrival at the receiving facility  Additionally, I authorize the release of any and all medical records to the receiving facility and request they be transported with me, if possible  I understand that the safest mode of transportation during a medical emergency is an ambulance and that the Hospital advocates the use of this mode of transport   Risks of traveling to the receiving facility by car, including absence of medical control, life sustaining equipment, such as oxygen, and medical personnel has been explained to me and I fully understand them  (CARMEN CORRECT BOX BELOW)  [  ]  I consent to the stated transfer and to be transported by ambulance/helicopter  [  ]  I consent to the stated transfer, but refuse transportation by ambulance and accept full responsibility for my transportation by car  I understand the risks of non-ambulance transfers and I exonerate the Hospital and its staff from any deterioration in my condition that results from this refusal     X___________________________________________    DATE  21  TIME________  Signature of patient or legally responsible individual signing on patient behalf           RELATIONSHIP TO PATIENT_________________________          Provider Certification    NAME Carl Pratt                                        [facility] 1960                              MRN [id number]    A medical screening exam was performed on the above named patient  Based on the examination:    Condition Necessitating Transfer The encounter diagnosis was Septic joint of left knee joint ([address] )      Patient Condition: The patient has been stabilized such that within reasonable medical probability, no material deterioration of the patient condition or the condition of the unborn child(raul) is likely to result from the transfer    Reason for Transfer: Level of Care needed not available at this facility    Transfer Requirements: Jourdan Olivier   · Space available and qualified personnel available for treatment as acknowledged by    · Agreed to accept transfer and to provide appropriate medical treatment as acknowledged by       Annemarie Lux  · Appropriate medical records of the examination and treatment of the patient are provided at the time of transfer   [address] _______  · Transfer will be performed by qualified personnel from    and appropriate transfer equipment as required, including the use of necessary and appropriate life support measures  Provider Certification: I have examined the patient and explained the following risks and benefits of being transferred/refusing transfer to the patient/family:  General risk, such as traffic hazards, adverse weather conditions, rough terrain or turbulence, possible failure of equipment (including vehicle or aircraft), or consequences of actions of persons outside the control of the transport personnel, Unanticipated needs of medical equipment and personnel during transport, Risk of worsening condition, The possibility of a transport vehicle being unavailable      Based on these reasonable risks and benefits to the patient and/or the unborn child(raul), and based upon the information available at the time of the patients examination, I certify that the medical benefits reasonably to be expected from the provision of appropriate medical treatments at another medical facility outweigh the increasing risks, if any, to the individuals medical condition, and in the case of labor to the unborn child, from effecting the transfer      X____________________________________________ DATE 05/05/21        TIME_______      ORIGINAL - SEND TO MEDICAL RECORDS   COPY - SEND WITH PATIENT DURING TRANSFER

## 2021-05-05 NOTE — PLAN OF CARE
Problem: PAIN - ADULT  Goal: Verbalizes/displays adequate comfort level or baseline comfort level  Description: Interventions:  - Encourage patient to monitor pain and request assistance  - Assess pain using appropriate pain scale  - Administer analgesics based on type and severity of pain and evaluate response  - Implement non-pharmacological measures as appropriate and evaluate response  - Consider cultural and social influences on pain and pain management  - Notify physician/advanced practitioner if interventions unsuccessful or patient reports new pain  Outcome: Progressing     Problem: INFECTION - ADULT  Goal: Absence or prevention of progression during hospitalization  Description: INTERVENTIONS:  - Assess and monitor for signs and symptoms of infection  - Monitor lab/diagnostic results  - Monitor all insertion sites, i e  indwelling lines, tubes, and drains  - Monitor endotracheal if appropriate and nasal secretions for changes in amount and color  - [place] appropriate cooling/warming therapies per order  - Administer medications as ordered  - Instruct and encourage patient and family to use good hand hygiene technique  - Identify and instruct in appropriate isolation precautions for identified infection/condition  Outcome: Progressing     Problem: SAFETY ADULT  Goal: Patient will remain free of falls  Description: INTERVENTIONS:  - Assess patient frequently for physical needs  -  Identify cognitive and physical deficits and behaviors that affect risk of falls    -  [place] fall precautions as indicated by assessment   - Educate patient/family on patient safety including physical limitations  - Instruct patient to call for assistance with activity based on assessment  - Modify environment to reduce risk of injury  - Consider OT/PT consult to assist with strengthening/mobility  Outcome: Progressing  Goal: Maintain or return to baseline ADL function  Description: INTERVENTIONS:  -  Assess patient's ability to carry out ADLs; assess patient's baseline for ADL function and identify physical deficits which impact ability to perform ADLs (bathing, care of mouth/teeth, toileting, grooming, dressing, etc )  - Assess/evaluate cause of self-care deficits   - Assess range of motion  - Assess patient's mobility; develop plan if impaired  - Assess patient's need for assistive devices and provide as appropriate  - Encourage maximum independence but intervene and supervise when necessary  - Involve family in performance of ADLs  - Assess for home care needs following discharge   - Consider OT consult to assist with ADL evaluation and planning for discharge  - Provide patient education as appropriate  Outcome: Progressing  Goal: Maintain or return mobility status to optimal level  Description: INTERVENTIONS:  - Assess patient's baseline mobility status (ambulation, transfers, stairs, etc )    - Identify cognitive and physical deficits and behaviors that affect mobility  - Identify mobility aids required to assist with transfers and/or ambulation (gait belt, sit-to-stand, lift, walker, cane, etc )  - [place] fall precautions as indicated by assessment  - Record patient progress and toleration of activity level on Mobility SBAR; progress patient to next Phase/Stage  - Instruct patient to call for assistance with activity based on assessment  - Consider rehabilitation consult to assist with strengthening/weightbearing, etc   Outcome: Progressing     Problem: DISCHARGE PLANNING  Goal: Discharge to home or other facility with appropriate resources  Description: INTERVENTIONS:  - Identify barriers to discharge w/patient and caregiver  - Arrange for needed discharge resources and transportation as appropriate  - Identify discharge learning needs (meds, wound care, etc )  - Arrange for interpretive services to assist at discharge as needed  - Refer to Case Management Department for coordinating discharge planning if the patient needs post-hospital services based on physician/advanced practitioner order or complex needs related to functional status, cognitive ability, or social support system  Outcome: Progressing     Problem: Knowledge Deficit  Goal: Patient/family/caregiver demonstrates understanding of disease process, treatment plan, medications, and discharge instructions  Description: Complete learning assessment and assess knowledge base    Interventions:  - Provide teaching at level of understanding  - Provide teaching via preferred learning methods  Outcome: Progressing

## 2021-05-05 NOTE — ANESTHESIA POSTPROCEDURE EVALUATION
Post-Op Assessment Note    CV Status:  Stable  Pain Score: 0    Pain management: adequate     Mental Status:  Awake   Hydration Status:  Stable   PONV Controlled:  None   Airway Patency:  Patent      Post Op Vitals Reviewed: Yes      Staff: Anesthesiologist         No complications documented      BP (P) 121/64 (05/05/21 1902)    Temp (P) 98 4 °F (36 9 °C) (05/05/21 1902)    Pulse (P) 100 (05/05/21 1902)   Resp (P) 16 (05/05/21 1902)    SpO2 (P) 95 % (05/05/21 1902)

## 2021-05-06 LAB
ALBUMIN SERPL BCP-MCNC: 2.8 G/DL (ref 3.5–5)
ALP SERPL-CCNC: 62 U/L (ref 46–116)
ALT SERPL W P-5'-P-CCNC: 33 U/L (ref 12–78)
ANION GAP SERPL CALCULATED.3IONS-SCNC: 11 MMOL/L (ref 4–13)
AST SERPL W P-5'-P-CCNC: 18 U/L (ref 5–45)
B BURGDOR IGG+IGM SER-ACNC: 35
BASOPHILS # BLD AUTO: 0.04 THOUSANDS/ΜL (ref 0–0.1)
BASOPHILS NFR BLD AUTO: 0 % (ref 0–1)
BILIRUB SERPL-MCNC: 0.79 MG/DL (ref 0.2–1)
BUN SERPL-MCNC: 12 MG/DL (ref 5–25)
CALCIUM ALBUM COR SERPL-MCNC: 9.2 MG/DL (ref 8.3–10.1)
CALCIUM SERPL-MCNC: 8.2 MG/DL (ref 8.3–10.1)
CHLORIDE SERPL-SCNC: 105 MMOL/L (ref 100–108)
CO2 SERPL-SCNC: 24 MMOL/L (ref 21–32)
CREAT SERPL-MCNC: 0.75 MG/DL (ref 0.6–1.3)
EOSINOPHIL # BLD AUTO: 0 THOUSAND/ΜL (ref 0–0.61)
EOSINOPHIL NFR BLD AUTO: 0 % (ref 0–6)
ERYTHROCYTE [DISTWIDTH] IN BLOOD BY AUTOMATED COUNT: 15.9 % (ref 11.6–15.1)
GFR SERPL CREATININE-BSD FRML MDRD: 98 ML/MIN/1.73SQ M
GLUCOSE SERPL-MCNC: 106 MG/DL (ref 65–140)
GLUCOSE SERPL-MCNC: 111 MG/DL (ref 65–140)
GLUCOSE SERPL-MCNC: 115 MG/DL (ref 65–140)
GLUCOSE SERPL-MCNC: 149 MG/DL (ref 65–140)
GLUCOSE SERPL-MCNC: 85 MG/DL (ref 65–140)
GLUCOSE SERPL-MCNC: 86 MG/DL (ref 65–140)
GLUCOSE SERPL-MCNC: 97 MG/DL (ref 65–140)
HCT VFR BLD AUTO: 40.3 % (ref 36.5–49.3)
HGB BLD-MCNC: 13.3 G/DL (ref 12–17)
IMM GRANULOCYTES # BLD AUTO: 0.12 THOUSAND/UL (ref 0–0.2)
IMM GRANULOCYTES NFR BLD AUTO: 1 % (ref 0–2)
LYMPHOCYTES # BLD AUTO: 1.46 THOUSANDS/ΜL (ref 0.6–4.47)
LYMPHOCYTES NFR BLD AUTO: 8 % (ref 14–44)
MCH RBC QN AUTO: 27.8 PG (ref 26.8–34.3)
MCHC RBC AUTO-ENTMCNC: 33 G/DL (ref 31.4–37.4)
MCV RBC AUTO: 84 FL (ref 82–98)
MONOCYTES # BLD AUTO: 2.16 THOUSAND/ΜL (ref 0.17–1.22)
MONOCYTES NFR BLD AUTO: 12 % (ref 4–12)
NEUTROPHILS # BLD AUTO: 14.78 THOUSANDS/ΜL (ref 1.85–7.62)
NEUTS SEG NFR BLD AUTO: 79 % (ref 43–75)
NRBC BLD AUTO-RTO: 0 /100 WBCS
PLATELET # BLD AUTO: 249 THOUSANDS/UL (ref 149–390)
PMV BLD AUTO: 9.6 FL (ref 8.9–12.7)
POTASSIUM SERPL-SCNC: 3.4 MMOL/L (ref 3.5–5.3)
PROT SERPL-MCNC: 6.6 G/DL (ref 6.4–8.2)
RBC # BLD AUTO: 4.78 MILLION/UL (ref 3.88–5.62)
SODIUM SERPL-SCNC: 140 MMOL/L (ref 136–145)
WBC # BLD AUTO: 18.56 THOUSAND/UL (ref 4.31–10.16)

## 2021-05-06 PROCEDURE — 99255 IP/OBS CONSLTJ NEW/EST HI 80: CPT | Performed by: INTERNAL MEDICINE

## 2021-05-06 PROCEDURE — 82948 REAGENT STRIP/BLOOD GLUCOSE: CPT

## 2021-05-06 PROCEDURE — 87040 BLOOD CULTURE FOR BACTERIA: CPT | Performed by: FAMILY MEDICINE

## 2021-05-06 PROCEDURE — 85025 COMPLETE CBC W/AUTO DIFF WBC: CPT | Performed by: PHYSICIAN ASSISTANT

## 2021-05-06 PROCEDURE — 87081 CULTURE SCREEN ONLY: CPT | Performed by: INTERNAL MEDICINE

## 2021-05-06 PROCEDURE — 99024 POSTOP FOLLOW-UP VISIT: CPT | Performed by: ORTHOPAEDIC SURGERY

## 2021-05-06 PROCEDURE — 99232 SBSQ HOSP IP/OBS MODERATE 35: CPT | Performed by: FAMILY MEDICINE

## 2021-05-06 PROCEDURE — 80053 COMPREHEN METABOLIC PANEL: CPT | Performed by: PHYSICIAN ASSISTANT

## 2021-05-06 RX ORDER — POTASSIUM CHLORIDE 20 MEQ/1
40 TABLET, EXTENDED RELEASE ORAL ONCE
Status: COMPLETED | OUTPATIENT
Start: 2021-05-06 | End: 2021-05-06

## 2021-05-06 RX ORDER — INSULIN GLARGINE 100 [IU]/ML
25 INJECTION, SOLUTION SUBCUTANEOUS EVERY MORNING
Status: DISCONTINUED | OUTPATIENT
Start: 2021-05-06 | End: 2021-05-09 | Stop reason: HOSPADM

## 2021-05-06 RX ADMIN — LISINOPRIL 20 MG: 20 TABLET ORAL at 10:35

## 2021-05-06 RX ADMIN — ACETAMINOPHEN 650 MG: 325 TABLET, FILM COATED ORAL at 19:01

## 2021-05-06 RX ADMIN — VANCOMYCIN HYDROCHLORIDE 1750 MG: 10 INJECTION, POWDER, LYOPHILIZED, FOR SOLUTION INTRAVENOUS at 14:47

## 2021-05-06 RX ADMIN — ATORVASTATIN CALCIUM 20 MG: 20 TABLET, FILM COATED ORAL at 10:32

## 2021-05-06 RX ADMIN — ENOXAPARIN SODIUM 40 MG: 40 INJECTION SUBCUTANEOUS at 10:32

## 2021-05-06 RX ADMIN — AMLODIPINE BESYLATE 10 MG: 10 TABLET ORAL at 10:36

## 2021-05-06 RX ADMIN — INSULIN GLARGINE 25 UNITS: 100 INJECTION, SOLUTION SUBCUTANEOUS at 10:36

## 2021-05-06 RX ADMIN — METOPROLOL TARTRATE 50 MG: 50 TABLET, FILM COATED ORAL at 10:36

## 2021-05-06 RX ADMIN — VANCOMYCIN HYDROCHLORIDE 1250 MG: 10 INJECTION, POWDER, LYOPHILIZED, FOR SOLUTION INTRAVENOUS at 06:52

## 2021-05-06 RX ADMIN — POTASSIUM CHLORIDE 40 MEQ: 1500 TABLET, EXTENDED RELEASE ORAL at 10:32

## 2021-05-06 RX ADMIN — OXYCODONE HYDROCHLORIDE 10 MG: 10 TABLET ORAL at 22:16

## 2021-05-06 NOTE — ASSESSMENT & PLAN NOTE
Patient has a history of essential hypertension  Will continue Norvasc 10 mg p o  Daily  Also will continue lisinopril 20 mg p o  Daily  Patient also takes metoprolol 50 mg daily  Vital signs as per unit routine  Monitor

## 2021-05-06 NOTE — ASSESSMENT & PLAN NOTE
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

## 2021-05-06 NOTE — PROGRESS NOTES
[facility] Internal Medicine Progress Note  Patient: Connie Markham 59 y o  male   MRN: [id number]  PCP: Celi Fernandez MD  Unit/Bed#: [address] [place] [address] Encounter: [id number]  Date Of Visit: 05/06/21    Primary Service: Jeremy Escamilla MD  Reason for Consultation: Medical managment    Problem List:    Principal Problem:    Septic arthritis of knee, right ([facility] )  Active Problems:    Type 2 diabetes mellitus, with long-term current use of insulin ([facility] )    Essential hypertension    Hyperlipidemia    Smoking      Assessment & Plan:    * Septic arthritis of knee, right [facility])  Assessment & Plan  Patient presented with right knee pain, evaluated by orthopedics  Patient denies any trauma to the right knee, 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  He indicated that he had some pain in the right knee and it got progressively worse over 12 hours to the point where he could not bear weight on it  Patient was taken to the OR for washout of septic arthritis of right knee  LAURA drain present, noted serosanguineous fluid in collection bulb  Continue on IV vancomycin and follow-up on pending cultures  Reports that his pain is being well controlled  CBC in a m  Smoking  Assessment & Plan  Smoking cessation counseled  Patient refused Nicotine patch      Hyperlipidemia  Assessment & Plan  Continue Lipitor 20 mg p o  Daily  Heart healthy diet  Essential hypertension  Assessment & Plan  continue Norvasc 10 mg p o  Daily, lisinopril 20 mg p o  Daily, metoprolol 50 mg daily  Monitor BPs    Type 2 diabetes mellitus, with long-term current use of insulin [facility])  Assessment & Plan  Lab Results   Component Value Date    HGBA1C 6 2 (H) 04/10/2021       Recent Labs     05/06/21  0011 05/06/21  0736 05/06/21  1120 05/06/21  1605   POCGLU 149* 85 111 106     Continue Lantus 25 units daily in a m  Along with sliding scale coverage a c   And HS   Hold metformin 1000 mg b i d  At this time  Diabetic diet        VTE Pharmacologic Prophylaxis:   Pharmacologic: Enoxaparin (Lovenox)  Mechanical:  Yes    Was care plan discussed with the Primary service today?: Yes    Education and Discussions with Family / Patient: Yes    Time Spent for Care: 30 minutes  More than 50% of total time spent on counseling and coordination of care as described above  Is patient acceptable for discharge from medicine standpoint?: No  Discharge Recommendations: N/A    Subjective:   Reports some pain around the knee  Denies any shortness of breath    Objective:     Vitals:   Temp (24hrs), Av 1 °F (37 3 °C), Min:98 4 °F (36 9 °C), Max:99 8 °F (37 7 °C)    Temp:  [98 4 °F (36 9 °C)-99 8 °F (37 7 °C)] 99 8 °F (37 7 °C)  HR:  [] 91  Resp:  [16-20] 20  BP: (108-135)/(64-76) 108/64  SpO2:  [94 %-99 %] 94 %  Body mass index is 28 8 kg/m²  Input and Output Summary (last 24 hours): Intake/Output Summary (Last 24 hours) at 2021 0944  Last data filed at 2021 0901  Gross per 24 hour   Intake 2133 75 ml   Output 1730 ml   Net 403 75 ml       Physical Exam:     Physical Exam  Constitutional:       General: He is not in acute distress  Appearance: He is well-developed  He is not diaphoretic  HENT:      Head: Normocephalic and atraumatic  Eyes:      General:         Right eye: No discharge  Left eye: No discharge  Cardiovascular:      Rate and Rhythm: Normal rate and regular rhythm  Pulmonary:      Effort: Pulmonary effort is normal  No respiratory distress  Breath sounds: Normal breath sounds  No wheezing or rales  Abdominal:      General: Bowel sounds are normal  There is no distension  Palpations: Abdomen is soft  Tenderness: There is no abdominal tenderness  Musculoskeletal:      Comments: Right lower extremity with Ace wrap in place    LAURA drain in place with serosanguineous drainage   Neurological:      Mental Status: He is alert and oriented to person, place, and time  Additional Data:     Labs:    Results from last 7 days   Lab Units 05/06/21  0657   WBC Thousand/uL 18 56*   HEMOGLOBIN g/dL 13 3   HEMATOCRIT % 40 3   PLATELETS Thousands/uL 249   NEUTROS PCT % 79*   LYMPHS PCT % 8*   MONOS PCT % 12   EOS PCT % 0     Results from last 7 days   Lab Units 05/06/21  0657   POTASSIUM mmol/L 3 4*   CHLORIDE mmol/L 105   CO2 mmol/L 24   BUN mg/dL 12   CREATININE mg/dL 0 75   CALCIUM mg/dL 8 2*   ALK PHOS U/L 62   ALT U/L 33   AST U/L 18           * I Have Reviewed All Lab Data Listed Above  * Additional Pertinent Lab Tests Reviewed: All Labs For Current Hospital Admission Reviewed    Imaging:    Xr Knee 4+ Vw Right Injury    Result Date: 5/5/2021  Narrative: RIGHT KNEE INDICATION:   Trauma  Right knee pain, edema COMPARISON:  None VIEWS:  XR KNEE 4+ VW RIGHT INJURY FINDINGS: There is no acute fracture or dislocation  There is a small joint effusion  Mild right knee osteoarthritis, mild narrowing of the medial joint compartment No lytic or blastic osseous lesion  Chondrocalcinosis is present  There is suspicion of a loose body along the posterior joint line measuring 10 mm     Impression: Right knee arthritis, small effusion, chondrocalcinosis and suspected loose body No acute osseous abnormality The examination demonstrates a significant  finding and [facility] as [facility] for liaison and referring practitioner notification   Workstation performed: [id number]     Imaging Reports Reviewed by myself    Cultures:   Blood Culture: No results found for: BLOODCX  Urine Culture: No results found for: URINECX  Sputum Culture: No components found for: SPUTUMCX  Wound Culture: No results found for: WOUNDCULT    Last 24 Hours Medication List:   Current Facility-Administered Medications   Medication Dose Route Frequency Provider Last Rate    acetaminophen  650 mg Oral Q6H PRN Miriam Gant PA-C      amLODIPine  10 mg Oral Daily Jumana Trudi, PA-C      atorvastatin  20 mg Oral Daily Jumana Trudi, PA-C      enoxaparin  40 mg Subcutaneous Daily JumanaShell Diego      insulin glargine  25 Units Subcutaneous QAM [facility], CRNP      insulin lispro  1-5 Units Subcutaneous HS [facility], CRNP      insulin lispro  1-6 Units Subcutaneous TID [facility], CRNP      lactated ringers  75 mL/hr Intravenous Continuous Jumana Trudi, PA-C 75 mL/hr (05/05/21 2046)    lisinopril  20 mg Oral Daily Jumana Trudi, PA-C      metoprolol tartrate  50 mg Oral Daily Jumana Trudi, PA-C      naloxone  0 04 mg Intravenous Q1MIN PRN Jumana Trudi, PA-C      nicotine  1 patch Transdermal Daily Jumana Trudi, PA-C      ondansetron  4 mg Intravenous Q6H PRN Jumana Trudi, PA-C      oxyCODONE  10 mg Oral Q4H PRN Jumana Trudi, PA-C      oxyCODONE-acetaminophen  1 tablet Oral Q4H PRN Jumana Trudi, PA-C      potassium chloride  40 mEq Oral Once Yandy Revankar,       vancomycin  15 mg/kg Intravenous Q12H Jumana Trudi, PA-C 1,250 mg (05/06/21 1862)        Today, Patient Was Seen By: Stefan Soni DO    ** Please Note: "This note has been constructed using a voice recognition system  Therefore there may be syntax, spelling, and/or grammatical errors   Please call if you have any questions  "**

## 2021-05-06 NOTE — PROGRESS NOTES
Vancomycin Assessment    Nhan Molina is a 59 y o  male who is currently receiving vancomycin 1250 mg IV  q12h   for    bone infection    Relevant clinical data and objective history reviewed:  Creatinine   Date Value Ref Range Status   05/05/2021 0 82 0 50 - 1 20 mg/dL Final     Comment:     Standardized to IDMS reference method   04/10/2021 0 88 0 50 - 1 20 mg/dL Final     Comment:     Standardized to IDMS reference method     /69 (BP Location: Right arm)   Pulse 72   Temp 99 4 °F (37 4 °C) (Oral)   Resp 16   Ht 5' 9" (1 753 m)   Wt 88 5 kg (195 lb)   SpO2 95%   BMI 28 80 kg/m²   No intake/output data recorded  Lab Results   Component Value Date/Time    BUN 12 05/05/2021 08:16 AM    WBC 15 98 (H) 05/05/2021 08:16 AM    HGB 14 2 05/05/2021 08:16 AM    HCT 41 4 05/05/2021 08:16 AM    MCV 80 (L) 05/05/2021 08:16 AM     05/05/2021 08:16 AM     Temp Readings from Last 3 Encounters:   05/05/21 99 4 °F (37 4 °C) (Oral)   05/05/21 99 2 °F (37 3 °C)   01/13/21 (!) 97 1 °F (36 2 °C)     Vancomycin Days of Therapy: 1    Assessment/Plan  The patient is currently on vancomycin utilizing scheduled dosing based on actual body weight  Baseline risks associated with therapy include: concomitant nephrotoxic medications and advanced age  The patient is currently receiving vancomycin 1250 mg IV q12h   Dose  is clinically appropriate and dose will be continued  Pharmacy will also follow closely for s/sx of nephrotoxicity, infusion reactions, appropriateness of therapy, and clinical improvement   BMP and CBC will be ordered per protocol  Plan for trough as patient approaches steady state, prior to the 4th  dose at approximately 0530 on 05/07/21  Due to infection severity, will target a trough of 15-20    Pharmacy will continue to follow the patients culture results and clinical progress daily      Lenny Medina, Pharmacist

## 2021-05-06 NOTE — PROGRESS NOTES
Consultation - Orthopedics   Luis Calderón 59 y o  male MRN: [id number]  Unit/Bed#: [address] [place] [address] Encounter: [id number]            HPI:   Luis Calderón is a 59y o  year old male who under went right knee arthroscopic washout for septic arthritis yesterday afternoon  He notes that he is feeling much better today with less pain about the knee  He still does not a mild to moderate discomfort though  He notes he can range the knee better today  He notes good sensation of the right lower extremity  Patient viola any CP, SOB, dizziness or chills  Intra-operative cultures are still pending  Patient has been on vancomycin  LAURA drain has been in place  WBC did increase to 18 5 today  Review of Systems   Constitutional: Negative  Negative for chills and fever  HENT: Negative  Negative for ear pain and sore throat  Eyes: Negative  Negative for pain and redness  Respiratory: Negative  Negative for shortness of breath and wheezing  Cardiovascular: Negative for chest pain and palpitations  Gastrointestinal: Negative  Negative for abdominal pain and blood in stool  Endocrine: Negative  Negative for polydipsia and polyuria  Genitourinary: Negative  Negative for difficulty urinating and dysuria  Musculoskeletal:        As noted in HPI   Skin: Negative  Negative for pallor and rash  Neurological: Negative  Negative for dizziness and numbness  Hematological: Negative  Negative for adenopathy  Does not bruise/bleed easily  Psychiatric/Behavioral: Negative  Negative for confusion and suicidal ideas         Historical Information   Past Medical History:   Diagnosis Date    Diabetes mellitus ([facility] [address] )     Hyperlipidemia     Hypertension     Squamous cell carcinoma of leg, right      Past Surgical History:   Procedure Laterality Date    CHOLECYSTECTOMY      HAND TENDON SURGERY      SPLENECTOMY, PARTIAL  1968    accident    TONSILLECTOMY      WOUND DEBRIDEMENT Right 5/5/2021    Procedure: RIGHT KNEE ARTHROSCOPY W/IRRIGATION AND DEBRIDEMENT OF SEPTIC ARTHRITIS;  Surgeon: Kalen Garcia MD;  Location: [facility];  Service: Orthopedics     Social History   Social History     Substance and Sexual Activity   Alcohol Use Yes    Frequency: 2-4 times a month    Drinks per session: 1 or 2     Social History     Substance and Sexual Activity   Drug Use Never     Social History     Tobacco Use   Smoking Status Current Every Day Smoker    Packs/day: 1 00    Years: 45 00    Pack years: 45 00    Types: Cigarettes   Smokeless Tobacco Never Used     Family History:   Family History   Problem Relation Age of Onset    Ovarian cancer Mother     Cancer Mother     Kidney disease Mother     Coronary artery disease Father     Diabetes Father     Cancer Brother        Meds/Allergies   all current active meds have been reviewed and current meds:   Current Facility-Administered Medications   Medication Dose Route Frequency    acetaminophen (TYLENOL) tablet 650 mg  650 mg Oral Q6H PRN    amLODIPine (NORVASC) tablet 10 mg  10 mg Oral Daily    atorvastatin (LIPITOR) tablet 20 mg  20 mg Oral Daily    enoxaparin (LOVENOX) subcutaneous injection 40 mg  40 mg Subcutaneous Daily    insulin glargine (LANTUS) subcutaneous injection 25 Units 0 25 mL  25 Units Subcutaneous QAM    insulin lispro (HumaLOG) 100 units/mL subcutaneous injection 1-5 Units  1-5 Units Subcutaneous HS    insulin lispro (HumaLOG) 100 units/mL subcutaneous injection 1-6 Units  1-6 Units Subcutaneous TID AC    lactated ringers infusion  75 mL/hr Intravenous Continuous    lisinopril (ZESTRIL) tablet 20 mg  20 mg Oral Daily    metoprolol tartrate (LOPRESSOR) tablet 50 mg  50 mg Oral Daily    naloxone (NARCAN) 0 04 mg/mL syringe 0 04 mg  0 04 mg Intravenous Q1MIN PRN    nicotine (NICODERM CQ) 21 mg/24 hr TD 24 hr patch 1 patch  1 patch Transdermal Daily    ondansetron (ZOFRAN) injection 4 mg  4 mg Intravenous Q6H PRN    oxyCODONE (ROXICODONE) IR tablet 10 mg  10 mg Oral Q4H PRN    oxyCODONE-acetaminophen (PERCOCET) 5-325 mg per tablet 1 tablet  1 tablet Oral Q4H PRN    potassium chloride (K-DUR,KLOR-CON) CR tablet 40 mEq  40 mEq Oral Once    vancomycin (VANCOCIN) 1,250 mg in sodium chloride 0 9 % 250 mL IVPB  15 mg/kg Intravenous Q12H     Allergies   Allergen Reactions    Amoxicillin Rash    Amoxicillin-Pot Clavulanate Rash       Objective   Vitals: Blood pressure 108/64, pulse 91, temperature 99 8 °F (37 7 °C), temperature source Tympanic, resp  rate 20, height 5' 9" (1 753 m), weight 88 5 kg (195 lb), SpO2 94 %  ,Body mass index is 28 8 kg/m²  Invasive Devices     Peripheral Intravenous Line            Peripheral IV 05/05/21 Right Forearm 1 day          Drain            Closed/Suction Drain Right Leg Bulb 10 Fr  less than 1 day                Physical Exam  Constitutional:       General: He is not in acute distress  Appearance: He is well-developed  HENT:      Head: Normocephalic and atraumatic  Eyes:      General: No scleral icterus  Conjunctiva/sclera: Conjunctivae normal    Neck:      Vascular: No JVD  Cardiovascular:      Rate and Rhythm: Normal rate  Pulmonary:      Effort: Pulmonary effort is normal  No respiratory distress  Skin:     General: Skin is warm  Neurological:      Mental Status: He is alert and oriented to person, place, and time  Coordination: Coordination normal           Ortho Exam     Right knee: Dressing CDI  LAURA drain in place with serosanguinous drainage noted, but no overt pus  No pain today with gentle ROM of the knee  Sensation intact right lower extremity moves foot and ankle freely  2+ DP pulse  Lab Results: I have personally reviewed pertinent lab results    CBC:   Lab Results   Component Value Date    WBC 18 56 (H) 05/06/2021    HGB 13 3 05/06/2021    HCT 40 3 05/06/2021    MCV 84 05/06/2021     05/06/2021    MCH 27 8 05/06/2021    MCHC 33 0 05/06/2021    RDW 15 9 (H) 05/06/2021    MPV 9 6 05/06/2021    NRBC 0 05/06/2021     CMP:   Lab Results   Component Value Date     05/06/2021    CO2 24 05/06/2021    BUN 12 05/06/2021    CREATININE 0 75 05/06/2021    CALCIUM 8 2 (L) 05/06/2021    AST 18 05/06/2021    ALT 33 05/06/2021    ALKPHOS 62 05/06/2021    EGFR 98 05/06/2021     PT/INR: No results found for: PT, INR            Assessment:  Right knee septic arthritis    Plan:  Despite an increase in WBC today, the patient is symptomatically and clinically doing much better today  His pain is well controlled at this point  We will leave his LAURA drain in place at this point and it is still actively draining serosanguinous fluid  Continue vancomycin until we have culture and sensitivity  Infectious disease has been consulted   Will continue to monitor WBC and clinical progress  No repeat washout advised at this time       Code Status: Level 1 - Full Code  Advance Directive and Living Will:      Power of :    POLST:

## 2021-05-06 NOTE — UTILIZATION REVIEW
Initial Clinical Review    Admission: Date/Time/Statement:   Admission Orders (From admission, onward)     Ordered        05/05/21 1717  Inpatient Admission  Once                   Orders Placed This Encounter   Procedures    Inpatient Admission     Standing Status:   Standing     Number of Occurrences:   1     Order Specific Question:   Level of Care     Answer:   Med Surg [16]     Order Specific Question:   Estimated length of stay     Answer:   Not Applicable     ED Arrival Information     Patient not seen in ED                     Initial Presentation:   59y Male, transfer from [facility] ED to [facility] med surg unit presents with worsening right knee pain x1 day, worse over 12hrs  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  [facility] for IDDM, HLD, HTN  Plan OR urgently for right knee washout  Admit Inpatient level of care for Right knee pain x 24 hrs  Right knee XR: Right knee arthritis, small effusion, chondrocalcinosis and suspected loose body  Synovial fluid aspiration showing 89,00 WBC, and dark michael color  Leukocytosis: 15 98  OR urgently for right knee washout  Preop antibiotics  5/5 OR - S/P RIGHT KNEE ARTHROSCOPY W/IRRIGATION AND DEBRIDEMENT OF SEPTIC ARTHRITIS (Right)  Operative Findings:  Right knee exam under anesthesia demonstrated good stability although very decreased range of motion noted from -5 degrees of extension to 90° of flexion  A large effusion was found and we did aspirate approximately 20 cc of rancho pus from the right knee at the beginning of the procedure  He intra-articularly had severe arthritic change throughout the knee in all 3 compartments but no obvious loose bodies but a significant amount of synovitis that was found on diagnostic arthroscopy  The ACL was intact  Medial lateral menisci were grossly intact with only some minor fraying noted and chondrocalcinosis noted  Extensive irrigation debridement with over 4000 cc of fluid   Drain placed as well to assist in eradication of the infection  5/5 Internal Med cons; Septic arthritis right knee  S/p OR  LAURA drain present, noted serosanguineous fluid in collection bulb  Continue IV antibiotics  Pain control  Cbc in    Date: 5/6    Day 2:   Progress notes; Continue Iv antibiotics  LAURA drain with serosanguinous drainage  Noted increase in Wbcto 18 5 today  Mild to moderated discomfort noted  Up and OOB as tolerated    ID cons; Septic arthritis right knee  Concern for Gram-positive infection such as Staphylococcus aureus or Streptococcus species  Initial synovial fluid analysis demonstrated WBC 04103  S/p right knee arthroscopy with irrigation and debridement of septic arthritis  Operative findings included right knee effusion with rancho pus  Follow synovial fluid cultures  Check MRSA nares screen as patient reports a prior history of skin boils  Continue Iv antibiotics  Cbc in am 5/7         ED Triage Vitals   Temperature Pulse Respirations Blood Pressure SpO2   05/05/21 1902 05/05/21 1902 05/05/21 1902 05/05/21 1902 05/05/21 1902   98 4 °F (36 9 °C) 100 16 121/64 95 %      Temp Source Heart Rate Source Patient Position - Orthostatic VS BP Location FiO2 (%)   05/05/21 2043 05/05/21 2043 05/05/21 2043 05/05/21 2043 --   Oral Monitor Lying Right arm       Pain Score       05/05/21 2043       No Pain          Wt Readings from Last 1 Encounters:   05/05/21 88 5 kg (195 lb)     Additional Vital Signs:   05/06/21 0900  99 6 °F (37 6 °C)  98  18  136/77  100  92 %  24  1 L/min  Nasal cannula  --  Lying   05/05/21 2333  99 8 °F (37 7 °C)  91  20  108/64  82  94 %  32  3 L/min  Nasal cannula  --  Lying   05/05/21 20:43:49  99 4 °F (37 4 °C)  72  16  130/69  93  --  --  --  --  --  Lying   05/05/21 1938  98 6 °F (37 °C)  85  16  127/67  --  95 %  32  3 L/min  Nasal cannula  WDL  --   05/05/21 1930  --  79  16  129/69  --  95 %  32  3 L/min  Nasal cannula         Pertinent Labs/Diagnostic Test Results:   5/5 Xray Right Knee - Right knee arthritis, small effusion, chondrocalcinosis and suspected loose body  No acute osseous abnormality       Results from last 7 days   Lab Units 05/05/21  1551   SARS-COV-2  Negative     Results from last 7 days   Lab Units 05/06/21  0657 05/05/21  0816   WBC Thousand/uL 18 56* 15 98*   HEMOGLOBIN g/dL 13 3 14 2   HEMATOCRIT % 40 3 41 4   PLATELETS Thousands/uL 249 266   NEUTROS ABS Thousands/µL 14 78* 12 24*         Results from last 7 days   Lab Units 05/06/21  0657 05/05/21  0816   SODIUM mmol/L 140 137   POTASSIUM mmol/L 3 4* 3 3*   CHLORIDE mmol/L 105 100   CO2 mmol/L 24 24   ANION GAP mmol/L 11 13   BUN mg/dL 12 12   CREATININE mg/dL 0 75 0 82   EGFR ml/min/1 73sq m 98 94   CALCIUM mg/dL 8 2* 9 4     Results from last 7 days   Lab Units 05/06/21  0657 05/05/21  0816   AST U/L 18 16   ALT U/L 33 13   ALK PHOS U/L 62 61 0   TOTAL PROTEIN g/dL 6 6 7 4   ALBUMIN g/dL 2 8* 4 2   TOTAL BILIRUBIN mg/dL 0 79 0 92     Results from last 7 days   Lab Units 05/06/21  0736 05/06/21  0011 05/05/21  2119 05/05/21  1929 05/05/21  0944   POC GLUCOSE mg/dl 85 149* 99 86 133     Results from last 7 days   Lab Units 05/06/21  0657 05/05/21  0816   GLUCOSE RANDOM mg/dL 97 148*       Results from last 7 days   Lab Units 05/05/21  0944   GRAM STAIN RESULT  3+ Polys  No bacteria seen     Results from last 7 days   Lab Units 05/05/21  1849 05/05/21  1849 05/05/21  0944   TOTAL COUNTED   --  100 100   NEUTROPHIL % (SYNOVIAL) %  --  75 86   MONOCYTE % (SYNOVIAL) %  --  4  --    WBC FLUID /ul 82,240*  --  89,000*   RBC, SYNOVIAL   --   --  50,000*   CRYSTALS, SYNOVIAL FLUID   --   --  No Crystals Seen           ED Treatment:   Medication Administration - No Administrations Displayed (No Start Event Found)     None        Past Medical History:   Diagnosis Date    Diabetes mellitus ([address] )     Hyperlipidemia     Hypertension     Squamous cell carcinoma of leg, right      Present on Admission:   Septic arthritis of knee, right [facility])   Essential hypertension      Admitting Diagnosis: septic knee  Age/Sex: 59 y o  male     Admission Orders:  Scheduled Medications:  amLODIPine, 10 mg, Oral, Daily  atorvastatin, 20 mg, Oral, Daily  enoxaparin, 40 mg, Subcutaneous, Daily  insulin glargine, 25 Units, Subcutaneous, QAM  insulin lispro, 1-5 Units, Subcutaneous, HS  insulin lispro, 1-6 Units, Subcutaneous, TID AC  lisinopril, 20 mg, Oral, Daily  metoprolol tartrate, 50 mg, Oral, Daily  nicotine, 1 patch, Transdermal, Daily  vancomycin, 15 mg/kg, Intravenous, Q12H      Continuous IV Infusions:  lactated ringers, 75 mL/hr, Intravenous, Continuous      PRN Meds:  acetaminophen, 650 mg, Oral, Q6H PRN  naloxone, 0 04 mg, Intravenous, Q1MIN PRN  ondansetron, 4 mg, Intravenous, Q6H PRN  oxyCODONE, 10 mg, Oral, Q4H PRN  oxyCODONE-acetaminophen, 1 tablet, Oral, Q4H PRN      Body fluid culture and Gram stain  Drain to bulb suction  IP CONSULT TO PHARMACY  IP CONSULT TO INTERNAL MEDICINE  IP CONSULT TO INFECTIOUS DISEASES    Network Utilization Review Department  ATTENTION: Please call with any questions or concerns to [phone] and carefully listen to the prompts so that you are directed to the right person  All voicemails are confidential   Efrem Davis all requests for admission clinical reviews, approved or denied determinations and any other requests to dedicated fax number below belonging to the campus where the patient is receiving treatment   List of dedicated fax numbers for the Facilities:  [address] [place] [address] DENIALS (Administrative/Medical Necessity) [phone]   [address] [place] [address] (Maternity/NICU/Pediatrics) [address] [facility] [place] [address] [facility] [address] [id number] [facility] [address] [phone]

## 2021-05-06 NOTE — ASSESSMENT & PLAN NOTE
continue Norvasc 10 mg p o  Daily, lisinopril 20 mg p o  Daily, metoprolol 50 mg daily    BPs stable

## 2021-05-06 NOTE — PLAN OF CARE
Problem: PAIN - ADULT  Goal: Verbalizes/displays adequate comfort level or baseline comfort level  Description: Interventions:  - Encourage patient to monitor pain and request assistance  - Assess pain using appropriate pain scale  - Administer analgesics based on type and severity of pain and evaluate response  - Implement non-pharmacological measures as appropriate and evaluate response  - Consider cultural and social influences on pain and pain management  - Notify physician/advanced practitioner if interventions unsuccessful or patient reports new pain  Outcome: Progressing     Problem: INFECTION - ADULT  Goal: Absence or prevention of progression during hospitalization  Description: INTERVENTIONS:  - Assess and monitor for signs and symptoms of infection  - Monitor lab/diagnostic results  - Monitor all insertion sites, i e  indwelling lines, tubes, and drains  - Monitor endotracheal if appropriate and nasal secretions for changes in amount and color  - [place] appropriate cooling/warming therapies per order  - Administer medications as ordered  - Instruct and encourage patient and family to use good hand hygiene technique  - Identify and instruct in appropriate isolation precautions for identified infection/condition  Outcome: Progressing     Problem: SAFETY ADULT  Goal: Patient will remain free of falls  Description: INTERVENTIONS:  - Assess patient frequently for physical needs  -  Identify cognitive and physical deficits and behaviors that affect risk of falls    -  [place] fall precautions as indicated by assessment   - Educate patient/family on patient safety including physical limitations  - Instruct patient to call for assistance with activity based on assessment  - Modify environment to reduce risk of injury  - Consider OT/PT consult to assist with strengthening/mobility  Outcome: Progressing  Goal: Maintain or return to baseline ADL function  Description: INTERVENTIONS:  -  Assess patient's ability to carry out ADLs; assess patient's baseline for ADL function and identify physical deficits which impact ability to perform ADLs (bathing, care of mouth/teeth, toileting, grooming, dressing, etc )  - Assess/evaluate cause of self-care deficits   - Assess range of motion  - Assess patient's mobility; develop plan if impaired  - Assess patient's need for assistive devices and provide as appropriate  - Encourage maximum independence but intervene and supervise when necessary  - Involve family in performance of ADLs  - Assess for home care needs following discharge   - Consider OT consult to assist with ADL evaluation and planning for discharge  - Provide patient education as appropriate  Outcome: Progressing  Goal: Maintain or return mobility status to optimal level  Description: INTERVENTIONS:  - Assess patient's baseline mobility status (ambulation, transfers, stairs, etc )    - Identify cognitive and physical deficits and behaviors that affect mobility  - Identify mobility aids required to assist with transfers and/or ambulation (gait belt, sit-to-stand, lift, walker, cane, etc )  - [place] fall precautions as indicated by assessment  - Record patient progress and toleration of activity level on Mobility SBAR; progress patient to next Phase/Stage  - Instruct patient to call for assistance with activity based on assessment  - Consider rehabilitation consult to assist with strengthening/weightbearing, etc   Outcome: Progressing     Problem: DISCHARGE PLANNING  Goal: Discharge to home or other facility with appropriate resources  Description: INTERVENTIONS:  - Identify barriers to discharge w/patient and caregiver  - Arrange for needed discharge resources and transportation as appropriate  - Identify discharge learning needs (meds, wound care, etc )  - Arrange for interpretive services to assist at discharge as needed  - Refer to Case Management Department for coordinating discharge planning if the patient needs post-hospital services based on physician/advanced practitioner order or complex needs related to functional status, cognitive ability, or social support system  Outcome: Progressing     Problem: Knowledge Deficit  Goal: Patient/family/caregiver demonstrates understanding of disease process, treatment plan, medications, and discharge instructions  Description: Complete learning assessment and assess knowledge base    Interventions:  - Provide teaching at level of understanding  - Provide teaching via preferred learning methods  Outcome: Progressing

## 2021-05-06 NOTE — CONSULTS
Consultation - Infectious Disease   Sivan Christina 59 y o  male MRN: [id number]  Unit/Bed#: 2 [place] [address] Encounter: [id number]    Extensive review of the medical records in Epic including review of the notes, radiographs, and laboratory results  IMPRESSION/RECOMMENDATIONS:    Septic arthritis right knee:  Concern for Gram-positive infection such as Staphylococcus aureus or Streptococcus species  Initial synovial fluid analysis demonstrated WBC 15869  No trauma is reported but symptom onset was acute and worsened within a short time frame  05/05/2021:  Status post right knee arthroscopy with irrigation and debridement of septic arthritis  Operative findings included right knee effusion with rancho pus   o Follow synovial fluid cultures  o Check MRSA nares screen as patient reports a prior history of skin boils  o Continue vancomycin IV pending culture results  o CBC in am  o Monitor clinical response, temperature curve   Leukocytosis:  Likely due to right knee septic arthritis  WBC rising today  o CBC in a m   Diabetes mellitus type 2 with mild hyperglycemia:  Noted HbA1c of 6 2 on 04/10/2021  This is a risk factor for infection and delayed wound healing  o Glycemic control per primary team   History of amoxicillin allergy:  Rash   Cigarette smoking  o Encourage smoking cessation     My recommendations were discussed with the patient in detail who verbalized understanding  My recommendations were discussed with INEZ Martinez, from the orthopedic service  Thank you for allowing me to participate in the care of this patient  The ID service will follow  HISTORY OF PRESENT ILLNESS:  Reason for Consult: Septic arthritis RT knee  CC: RT knee pain, swelling  HPI: Sivan Christina is a 59y o  year old male with PMH of IDDM, HTN, HDL who was admitted yesterday with acute onset right knee pain  His right knee pain began while at work  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    He is on his feet for most of his shift which typically last 8 hours per day  He denies any trauma to his knee  His knee pain progressively and worsened over a 12 hour period  He reports difficulty walking, bearing weight and bending his knee  He denies prior injury or surgery to his right knee  He reports a prior history of skin boils of his thighs  He denies a prior history of MRSA infection  Patient underwent right knee arthrocentesis with WBC greater than 80,000 and he was noted to have a leukocytosis of 15,000  There was concern for septic arthritis  Patient underwent right knee arthroscopy with irrigation and debridement  During the procedure, a large effusion was noted with aspiration of 20 cc of rancho pus from the right knee  Late this afternoon patient had a temperature 100 7° F and feels warm  His range of motion is improving and patient was able to walk to the bathroom today  He does report postop right knee soreness  REVIEW OF SYSTEMS:  Constitutional: +fever  Negative for chills  HENT: Negative runny nose, sore throat, congestion  Eyes: Negative for change in vision  Respiratory: Negative cough, shortness of breath  Cardiovascular: Negative for chest pain, palpitations  Gastrointestinal: Negative for abdominal pain, nausea, vomiting, diarrhea  Genitourinary:  Negative for dysuria, hematuria  Musculoskeletal: RT knee pain, swelling per HPI  Pt reports wear/tear arthritis of hands/knees   Skin: Negative for rash, wound  Neurological: Negative for dizziness, syncope  Hematological: +Easy bruising  Negative for excessive bleeding  Psychiatric/Behavioral: Negative for confusion, hallucination        PAST MEDICAL HISTORY:  Past Medical History:   Diagnosis Date    Diabetes mellitus ([facility] [address] )     Hyperlipidemia     Hypertension     Squamous cell carcinoma of leg, right      Past Surgical History:   Procedure Laterality Date    CHOLECYSTECTOMY      HAND TENDON SURGERY      SPLENECTOMY, PARTIAL  1968    accident    TONSILLECTOMY      WOUND DEBRIDEMENT Right 5/5/2021    Procedure: RIGHT KNEE ARTHROSCOPY W/IRRIGATION AND DEBRIDEMENT OF SEPTIC ARTHRITIS;  Surgeon: Kalen Garcia MD;  Location: [facility];  Service: Orthopedics     FAMILY HISTORY:  Negative for immunodeficiency    SOCIAL HISTORY:  Social History   Social History     Substance and Sexual Activity   Alcohol Use Yes    Frequency: 2-4 times a month    Drinks per session: 1 or 2     Social History     Substance and Sexual Activity   Drug Use Never     Social History     Tobacco Use   Smoking Status Current Every Day Smoker    Packs/day: 1 00    Years: 45 00    Pack years: 45 00    Types: Cigarettes   Smokeless Tobacco Never Used   Lives at home with his wife  1 pet cat: occasional scratches reported when he scratches its belly  ALLERGIES:  Allergies   Allergen Reactions    Amoxicillin Rash    Amoxicillin-Pot Clavulanate Rash     MEDICATIONS:  All current active medications have been reviewed    Antibiotics: vancomycin iv since 5/5  Scheduled Meds:  Current Facility-Administered Medications   Medication Dose Route Frequency Provider Last Rate    acetaminophen  650 mg Oral Q6H PRN Radha Garcia PA-C      amLODIPine  10 mg Oral Daily Radha Garcia PA-C      atorvastatin  20 mg Oral Daily Radha Garcia PA-C      enoxaparin  40 mg Subcutaneous Daily Shell Iniguez      insulin glargine  25 Units Subcutaneous QAM BALDOMERO Scott      insulin lispro  1-5 Units Subcutaneous HS BALDOMERO Scott      insulin lispro  1-6 Units Subcutaneous TID AC BALDOMERO Scott      lactated ringers  75 mL/hr Intravenous Continuous Radha Garcia PA-C 75 mL/hr (05/05/21 2046)    lisinopril  20 mg Oral Daily Radha Garcia PA-C      metoprolol tartrate  50 mg Oral Daily Radha Garcai PA-C      naloxone  0 04 mg Intravenous Q1MIN PRN BILL Iniguez nicotine  1 patch Transdermal Daily Onliz FidelBILL rowe      ondansetron  4 mg Intravenous Q6H PRN Onpradiphoa LillyBILL      oxyCODONE  10 mg Oral Q4H PRN Onpradiphoa ParraBILL rowe      oxyCODONE-acetaminophen  1 tablet Oral Q4H PRN Onpradip FidelBILL rowe      vancomycin  20 mg/kg Intravenous Q12H OnpradipsoledadBILL 1,750 mg (21 1447)     Continuous Infusions:lactated ringers, 75 mL/hr, Last Rate: 75 mL/hr (21)      PRN Meds:   acetaminophen    naloxone    ondansetron    oxyCODONE    oxyCODONE-acetaminophen     PHYSICAL EXAM:  Temp:  [98 4 °F (36 9 °C)-99 8 °F (37 7 °C)] 99 6 °F (37 6 °C)  HR:  [] 98  Resp:  [16-20] 18  BP: (108-136)/(64-77) 136/77  SpO2:  [92 %-95 %] 92 %  Temp (24hrs), Av 2 °F (37 3 °C), Min:98 4 °F (36 9 °C), Max:99 8 °F (37 7 °C)  Current: Temperature: 99 6 °F (37 6 °C)    Intake/Output Summary (Last 24 hours) at 2021 1812  Last data filed at 2021 1545  Gross per 24 hour   Intake 3093 75 ml   Output 3505 ml   Net -411 25 ml     General Appearance:  Appearing well, nontoxic, and in no acute distress   Head:  Normocephalic, without obvious abnormality, atraumatic   Eyes:  EOMI, Conjunctiva pink and sclera anicteric, both eyes   Nose: Nares normal, mucosa normal, no drainage   Throat: Oropharynx moist    Neck: Supple   Lungs:  Clear to auscultation bilaterally, respirations unlabored   Heart:   S1, S2, regular rate,rhythm, no murmur   Abdomen: Soft, non-tender, non-distended   :  No Loco catheter present   Extremities:   Right knee dressing and Ace wrap intact, LAURA drain noted with bloody drainage  No distal leg edema b/l   Skin: No rash  Neurologic: Alert and oriented x  3, conversant, fluent speech   Psychiatric: Calm, cooperative with exam and interview   Lymph nodes: Cervical, supraclavicular nodes normal     LABS, IMAGING, & OTHER STUDIES:  Lab Results:  I have personally reviewed pertinent labs and cultures    Results from last 7 days   Lab Units 05/06/21  0657 05/05/21  0816   WBC Thousand/uL 18 56* 15 98*   HEMOGLOBIN g/dL 13 3 14 2   PLATELETS Thousands/uL 249 266     Results from last 7 days   Lab Units 05/06/21  0657 05/05/21  0816   SODIUM mmol/L 140 137   POTASSIUM mmol/L 3 4* 3 3*   CHLORIDE mmol/L 105 100   CO2 mmol/L 24 24   BUN mg/dL 12 12   CREATININE mg/dL 0 75 0 82   EGFR ml/min/1 73sq m 98 94   CALCIUM mg/dL 8 2* 9 4   AST U/L 18 16   ALT U/L 33 13   ALK PHOS U/L 62 61 0     Results from last 7 days   Lab Units 05/05/21  1849 05/05/21  0944   GRAM STAIN RESULT  4+ Polys  No organisms seen 3+ Polys  No bacteria seen   BODY FLUID CULTURE, STERILE   --  No growth     Imaging Studies:   5/5/21 RT knee XR:  Right knee arthritis, small effusion  I have personally reviewed pertinent imaging study reports and images in PACS

## 2021-05-06 NOTE — ASSESSMENT & PLAN NOTE
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

## 2021-05-06 NOTE — ASSESSMENT & PLAN NOTE
Lab Results   Component Value Date    HGBA1C 6 2 (H) 04/10/2021       Recent Labs     05/05/21  0944 05/05/21 2119 05/06/21  0011   POCGLU 133 99 149*       Blood Sugar Average: Last 72 hrs:  (P) 124     Patient has a history of type 2 diabetes  Reports that he uses Lantus 25 units daily in a m     Will do sliding scale coverage a c  And HS  Hold metformin 1000 mg b i d  At this time    Diabetic diet

## 2021-05-06 NOTE — ASSESSMENT & PLAN NOTE
Lab Results   Component Value Date    HGBA1C 6 2 (H) 04/10/2021       Recent Labs     05/08/21  1617 05/08/21 2050 05/09/21  0733 05/09/21  1119   POCGLU 97 140 118 170*     Continue Lantus 25 units daily in a m  Along with sliding scale coverage a c  And HS while here  Holding metformin 1000 mg b i d  At this time    Diabetic diet  Resume home meds on discharge

## 2021-05-06 NOTE — CONSULTS
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  He indicated that he had some pain in the right knee and it got progressively worse over 12 hours to the point where he could not bear weight on it  Patient was taken to the OR for washout of septic arthritis of right knee  LAURA drain present, noted serosanguineous fluid in collection bulb  He was started on IV vancomycin as per orthopedic team   Reports that his pain is being well controlled  CBC in a m  Pastora Ho Type 2 diabetes mellitus, with long-term current use of insulin [facility])  Assessment & Plan  Lab Results   Component Value Date    HGBA1C 6 2 (H) 04/10/2021       Recent Labs     05/05/21  0944 05/05/21  2119 05/06/21  0011   POCGLU 133 99 149*       Blood Sugar Average: Last 72 hrs:  (P) 124     Patient has a history of type 2 diabetes  Reports that he uses Lantus 25 units daily in a m     Will do sliding scale coverage a c  And HS  Hold metformin 1000 mg b i d  At this time  Diabetic diet    Essential hypertension  Assessment & Plan  Patient has a history of essential hypertension  Will continue Norvasc 10 mg p o  Daily  Also will continue lisinopril 20 mg p o  Daily  Patient also takes metoprolol 50 mg daily  Vital signs as per unit routine  Monitor  Smoking  Assessment & Plan  Smoking cessation counseled  Nicotine patch ordered for patient        Hyperlipidemia  Assessment & Plan  Patient has a history of hyperlipidemia  Continue Lipitor 20 mg p o  Daily  Heart healthy diet  VTE Prophylaxis: lovenox  / sequential compression device     Recommendations for Discharge:  · As per the orthopedic primary team    Counseling / Coordination of Care Time: 1 hour  Greater than 50% of total time spent on patient counseling and coordination of care  Collaboration of Care: Were Recommendations Directly Discussed with Primary Treatment Team? 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  He felt some pain in his right knee, denied any trauma to it  During the course of the day the pain got increasingly worse to a point where the patient was not able to bear weight on it  He presented to the emergency department at [facility] for further evaluation and treatment  Right knee x-ray was performed which showed a small effusion, chondrocalcinosis and suspected loose body, no acute osseous abnormality as per radiology report  The case was discussed with the on-call orthopedic team and patient was subsequently transferred to [facility] for further evaluation and treatment  The patient was taken to the OR for urgent washout of the right knee for septic arthritis of that joint  He was seen postoperatively resting comfortably in the room  He has an Ace bandage from the lower part of the calf to above the thigh on the right leg with a LAURA drain present draining serosanguineous fluid  Patient reports that his pain has been well controlled  Admits to being an insulin-dependent diabetic, does take Lantus 25 units daily Q morning    Will order for the patient  Will also order sliding scale coverage a c  And HS  Patient normally takes Jardiance and metformin, will hold off on these at this time in case patient needs to be made NPO for any further surgical interventions  This was discussed with the patient at the bedside, he verbalized understanding and is agreeable to the plan  Review of Systems:    Review of Systems   Constitutional: Positive for activity change  Negative for chills, fatigue and fever  HENT: Negative  Eyes: Negative  Respiratory: Negative  Cardiovascular: Negative for chest pain and leg swelling  Gastrointestinal: Negative for abdominal distention, constipation, diarrhea, nausea and vomiting  Endocrine: Negative  Genitourinary: Negative  Musculoskeletal: Positive for gait problem and joint swelling  Skin: Negative  Allergic/Immunologic: Negative  Neurological: Negative for dizziness and headaches  Hematological: Negative  Psychiatric/Behavioral: Negative  Past Medical and Surgical History:     Past Medical History:   Diagnosis Date    Diabetes mellitus ([facility] [address] )     Hyperlipidemia     Hypertension     Squamous cell carcinoma of leg, right        Past Surgical History:   Procedure Laterality Date    CHOLECYSTECTOMY      HAND TENDON SURGERY      SPLENECTOMY, PARTIAL  1968    accident    TONSILLECTOMY         Meds/Allergies:    all medications and allergies reviewed    Allergies:    Allergies   Allergen Reactions    Amoxicillin Rash    Amoxicillin-Pot Clavulanate Rash       Social History:     Marital Status: /Civil Union    Substance Use History:   Social History     Substance and Sexual Activity   Alcohol Use Yes    Frequency: 2-4 times a month    Drinks per session: 1 or 2     Social History     Tobacco Use   Smoking Status Current Every Day Smoker    Packs/day: 1 00    Years: 45 00    Pack years: 45 00    Types: Cigarettes   Smokeless Tobacco Never Used     Social History Substance and Sexual Activity   Drug Use Never       Family History:    Family History   Problem Relation Age of Onset    Ovarian cancer Mother     Cancer Mother     Kidney disease Mother     Coronary artery disease Father     Diabetes Father     Cancer Brother        Physical Exam:     Vitals:   Blood Pressure: 108/64 (05/05/21 2333)  Pulse: 91 (05/05/21 2333)  Temperature: 99 8 °F (37 7 °C) (05/05/21 2333)  Temp Source: Tympanic (05/05/21 2333)  Respirations: 20 (05/05/21 2333)  Height: 5' 9" (175 3 cm) (05/05/21 1700)  Weight - Scale: 88 5 kg (195 lb) (05/05/21 1700)  SpO2: 94 % (05/05/21 2333)    Physical Exam  Vitals signs and nursing note reviewed  Constitutional:       General: He is not in acute distress  Appearance: Normal appearance  HENT:      Head: Normocephalic  Nose: Nose normal       Mouth/Throat:      Mouth: Mucous membranes are moist    Eyes:      Extraocular Movements: Extraocular movements intact  Pupils: Pupils are equal, round, and reactive to light  Neck:      Musculoskeletal: Normal range of motion  Cardiovascular:      Rate and Rhythm: Normal rate and regular rhythm  Pulses: Normal pulses  Heart sounds: Normal heart sounds  Pulmonary:      Effort: Pulmonary effort is normal       Breath sounds: Normal breath sounds  Abdominal:      General: Bowel sounds are normal  There is no distension  Palpations: Abdomen is soft  Tenderness: There is no abdominal tenderness  Genitourinary:     Comments: Voiding spontaneously  Musculoskeletal:      Comments: Right knee has Ace wrap from lower calf to above knee present  LAURA drain present, draining serosanguineous fluid  No strike through drainage appreciated on dressing  Skin:     General: Skin is warm and dry  Capillary Refill: Capillary refill takes less than 2 seconds  Neurological:      Mental Status: He is alert and oriented to person, place, and time     Psychiatric:         Mood and Affect: Mood normal          Behavior: Behavior normal          Thought Content: Thought content normal          Judgment: Judgment normal                Additional Data:     Lab Results: I have personally reviewed pertinent reports  Results from last 7 days   Lab Units 05/05/21  0816   WBC Thousand/uL 15 98*   HEMOGLOBIN g/dL 14 2   HEMATOCRIT % 41 4   PLATELETS Thousands/uL 266   NEUTROS PCT % 77*   LYMPHS PCT % 7*   MONOS PCT % 16*   EOS PCT % 0     Results from last 7 days   Lab Units 05/05/21  0816   POTASSIUM mmol/L 3 3*   CHLORIDE mmol/L 100   CO2 mmol/L 24   BUN mg/dL 12   CREATININE mg/dL 0 82   CALCIUM mg/dL 9 4   ALK PHOS U/L 61 0   ALT U/L 13   AST U/L 16             Lab Results   Component Value Date/Time    HGBA1C 6 2 (H) 04/10/2021 09:17 AM     Results from last 7 days   Lab Units 05/06/21  0011 05/05/21  2119 05/05/21  0944   POC GLUCOSE mg/dl 149* 99 133       Imaging: I have personally reviewed pertinent reports  No orders to display       EKG, Pathology, and Other Studies Reviewed on Admission:   · EKG:  None    ** Please Note: This note has been constructed using a voice recognition system   **

## 2021-05-06 NOTE — PROGRESS NOTES
Vancomycin Assessment    Cristina Roblero is a 59 y o  male who is currently receiving vancomycin Vancomycin 1750 mg IV q12h for other Septic Arthritis   Relevant clinical data and objective history reviewed:  Creatinine   Date Value Ref Range Status   05/06/2021 0 75 0 60 - 1 30 mg/dL Final     Comment:     Standardized to IDMS reference method   05/05/2021 0 82 0 50 - 1 20 mg/dL Final     Comment:     Standardized to IDMS reference method   04/10/2021 0 88 0 50 - 1 20 mg/dL Final     Comment:     Standardized to IDMS reference method     /77 (BP Location: Left arm)   Pulse 98   Temp 99 6 °F (37 6 °C) (Tympanic)   Resp 18   Ht 5' 9" (1 753 m)   Wt 88 5 kg (195 lb)   SpO2 92%   BMI 28 80 kg/m²   I/O last 3 completed shifts: In: 2133 8 [P O :690; I V :1443 8]  Out: 1680 [Urine:1670; Drains:10]  Lab Results   Component Value Date/Time    BUN 12 05/06/2021 06:57 AM    WBC 18 56 (H) 05/06/2021 06:57 AM    HGB 13 3 05/06/2021 06:57 AM    HCT 40 3 05/06/2021 06:57 AM    MCV 84 05/06/2021 06:57 AM     05/06/2021 06:57 AM     Temp Readings from Last 3 Encounters:   05/06/21 99 6 °F (37 6 °C) (Tympanic)   05/05/21 99 2 °F (37 3 °C)   01/13/21 (!) 97 1 °F (36 2 °C)     Vancomycin Days of Therapy: 2    Assessment/Plan  The patient is currently on vancomycin utilizing scheduled dosing based on actual body weight  Baseline risks associated with therapy include: concomitant nephrotoxic medications and advanced age  The patient is currently receiving Vancomycin 1750 mg IV q12h and is clinically appropriate and dose will be continued  Pharmacy will also follow closely for s/sx of nephrotoxicity, infusion reactions, and appropriateness of therapy  BMP and CBC will be ordered per protocol  Plan for trough as patient approaches steady state, prior to the 5th  dose at approximately 1230 on 05/08/2021  Due to infection severity, will target a trough of 15-20 (appropriate for most indications)     Pharmacy will continue to follow the patients culture results and clinical progress daily      Deborah Blunt, Pharmacist

## 2021-05-07 ENCOUNTER — RA CDI HCC (OUTPATIENT)
Dept: OTHER | Facility: HOSPITAL | Age: 61
End: 2021-05-07

## 2021-05-07 LAB
ANION GAP SERPL CALCULATED.3IONS-SCNC: 10 MMOL/L (ref 4–13)
BUN SERPL-MCNC: 14 MG/DL (ref 5–25)
CALCIUM SERPL-MCNC: 8.1 MG/DL (ref 8.3–10.1)
CHLORIDE SERPL-SCNC: 106 MMOL/L (ref 100–108)
CO2 SERPL-SCNC: 24 MMOL/L (ref 21–32)
CREAT SERPL-MCNC: 0.73 MG/DL (ref 0.6–1.3)
ERYTHROCYTE [DISTWIDTH] IN BLOOD BY AUTOMATED COUNT: 15.9 % (ref 11.6–15.1)
GFR SERPL CREATININE-BSD FRML MDRD: 99 ML/MIN/1.73SQ M
GLUCOSE SERPL-MCNC: 105 MG/DL (ref 65–140)
GLUCOSE SERPL-MCNC: 127 MG/DL (ref 65–140)
GLUCOSE SERPL-MCNC: 80 MG/DL (ref 65–140)
GLUCOSE SERPL-MCNC: 85 MG/DL (ref 65–140)
HCT VFR BLD AUTO: 39.7 % (ref 36.5–49.3)
HGB BLD-MCNC: 13 G/DL (ref 12–17)
MCH RBC QN AUTO: 27.7 PG (ref 26.8–34.3)
MCHC RBC AUTO-ENTMCNC: 32.7 G/DL (ref 31.4–37.4)
MCV RBC AUTO: 85 FL (ref 82–98)
PLATELET # BLD AUTO: 245 THOUSANDS/UL (ref 149–390)
PMV BLD AUTO: 10.3 FL (ref 8.9–12.7)
POTASSIUM SERPL-SCNC: 3.3 MMOL/L (ref 3.5–5.3)
RBC # BLD AUTO: 4.7 MILLION/UL (ref 3.88–5.62)
SODIUM SERPL-SCNC: 140 MMOL/L (ref 136–145)
WBC # BLD AUTO: 14.51 THOUSAND/UL (ref 4.31–10.16)

## 2021-05-07 PROCEDURE — 99232 SBSQ HOSP IP/OBS MODERATE 35: CPT | Performed by: INTERNAL MEDICINE

## 2021-05-07 PROCEDURE — 86611 BARTONELLA ANTIBODY: CPT | Performed by: INTERNAL MEDICINE

## 2021-05-07 PROCEDURE — 97116 GAIT TRAINING THERAPY: CPT

## 2021-05-07 PROCEDURE — 99232 SBSQ HOSP IP/OBS MODERATE 35: CPT | Performed by: FAMILY MEDICINE

## 2021-05-07 PROCEDURE — 80048 BASIC METABOLIC PNL TOTAL CA: CPT | Performed by: FAMILY MEDICINE

## 2021-05-07 PROCEDURE — 82948 REAGENT STRIP/BLOOD GLUCOSE: CPT

## 2021-05-07 PROCEDURE — 99024 POSTOP FOLLOW-UP VISIT: CPT | Performed by: PHYSICIAN ASSISTANT

## 2021-05-07 PROCEDURE — 97163 PT EVAL HIGH COMPLEX 45 MIN: CPT

## 2021-05-07 PROCEDURE — 85027 COMPLETE CBC AUTOMATED: CPT | Performed by: FAMILY MEDICINE

## 2021-05-07 RX ORDER — POTASSIUM CHLORIDE 20 MEQ/1
40 TABLET, EXTENDED RELEASE ORAL ONCE
Status: COMPLETED | OUTPATIENT
Start: 2021-05-07 | End: 2021-05-07

## 2021-05-07 RX ADMIN — OXYCODONE HYDROCHLORIDE AND ACETAMINOPHEN 1 TABLET: 5; 325 TABLET ORAL at 08:28

## 2021-05-07 RX ADMIN — AMLODIPINE BESYLATE 10 MG: 10 TABLET ORAL at 08:28

## 2021-05-07 RX ADMIN — OXYCODONE HYDROCHLORIDE 10 MG: 10 TABLET ORAL at 02:54

## 2021-05-07 RX ADMIN — LISINOPRIL 20 MG: 20 TABLET ORAL at 08:28

## 2021-05-07 RX ADMIN — VANCOMYCIN HYDROCHLORIDE 1750 MG: 10 INJECTION, POWDER, LYOPHILIZED, FOR SOLUTION INTRAVENOUS at 00:19

## 2021-05-07 RX ADMIN — OXYCODONE HYDROCHLORIDE 10 MG: 10 TABLET ORAL at 17:08

## 2021-05-07 RX ADMIN — METOPROLOL TARTRATE 50 MG: 50 TABLET, FILM COATED ORAL at 08:29

## 2021-05-07 RX ADMIN — INSULIN GLARGINE 25 UNITS: 100 INJECTION, SOLUTION SUBCUTANEOUS at 08:28

## 2021-05-07 RX ADMIN — ENOXAPARIN SODIUM 40 MG: 40 INJECTION SUBCUTANEOUS at 08:29

## 2021-05-07 RX ADMIN — POTASSIUM CHLORIDE 40 MEQ: 1500 TABLET, EXTENDED RELEASE ORAL at 10:48

## 2021-05-07 RX ADMIN — VANCOMYCIN HYDROCHLORIDE 1750 MG: 10 INJECTION, POWDER, LYOPHILIZED, FOR SOLUTION INTRAVENOUS at 15:44

## 2021-05-07 RX ADMIN — OXYCODONE HYDROCHLORIDE 10 MG: 10 TABLET ORAL at 22:52

## 2021-05-07 RX ADMIN — ATORVASTATIN CALCIUM 20 MG: 20 TABLET, FILM COATED ORAL at 08:28

## 2021-05-07 NOTE — PHYSICAL THERAPY NOTE
PT EVALUATION       05/07/21 0850   Note Type   Note type Evaluation   Pain Assessment   Pain Assessment Tool 0-10   Pain Score 5   Pain Location/Orientation   (R knee when walking, pt had pain meds prior to PT)   Home Living   Type of [address] [place] [address] Two level;1/2 bath on main level;Bed/bath upstairs   Home Equipment   (can borror a walker or cane if needed)   Prior Function   Level of [place] Independent with ADLs and functional mobility   Lives With Significant other   ADL Assistance Independent   Restrictions/Precautions   RLE Weight Bearing Per Order WBAT   Other Precautions Pain   General   Additional Pertinent History Pt admitted with R knee septic arthritis 1 day s/p washout  Pt now has a drain in his R knee  Cognition   Overall Cognitive Status WFL   RLE Assessment   RLE Assessment   (R knee ROM 10-80, MMT at least 3-/5)   LLE Assessment   LLE Assessment WNL   Bed Mobility   Supine to Sit 7  Independent   Sit to Supine 7  Independent   Transfers   Sit to Stand 5  Supervision   Stand to Sit 5  Supervision   Stand pivot 5  Supervision   Ambulation/Elevation   Gait pattern   (antalgic R, ambulates with R knee flexion)   Gait Assistance 5  Supervision   Assistive Device   (none)   Distance 50 feet   Balance   Static Sitting Good   Dynamic Sitting Fair +   Static Standing Fair +   Dynamic Standing Fair   Ambulatory Fair   Activity Tolerance   Activity Tolerance Patient limited by pain; Patient limited by fatigue   Assessment   Prognosis Good   Problem List Decreased strength;Decreased range of motion; Impaired balance;Decreased mobility;Pain   Assessment Patient seen for Physical Therapy evaluation  Patient admitted with Septic arthritis of knee, right ([address] )  Comorbidities affecting patient's physical performance include: DM, htn  Personal factors affecting patient at time of initial evaluation include: lives in 2 story house and inability to navigate community distances   Prior to admission, patient was independent with functional mobility without assistive device and independent with ADLS  Please find objective findings from Physical Therapy assessment regarding body systems outlined above with impairments and limitations including weakness, decreased ROM, impaired balance, pain, decreased activity tolerance, decreased functional mobility tolerance, fall risk and orthopedic restrictions  The Barthel Index was used as a functional outcome tool presenting with a score of 80 today indicating moderate limitations of functional mobility and ADLS  Patient's clinical presentation is currently unstable/unpredictable as seen in patient's presentation of changing level of pain, increased fall risk and new onset of weakness, new onset of impaired functional mobility  Pt would benefit from continued Physical Therapy treatment to address deficits as defined above and maximize level of functional mobility  As demonstrated by objective findings, the assigned level of complexity for this evaluation is high  The patient's AM-[facility] Basic Mobility Inpatient Short Form Raw Score is 22, Standardized Score is 47 4  A standardized score greater than 42 9 suggests the patient may benefit from discharge to home       Goals   Patient Goals "walk without pain   STG Expiration Date 05/14/21   Short Term Goal #1 Independent ambulation with least restrictive assistive device indoor surfaces 100 ft with minimally antalgic gait, independent up and down 1 flight of steps with a rail and cane with a step 2 pattern, improve right knee range of motion to 0-105   LTG Expiration Date 05/21/21   Long Term Goal #1 Independent ambulation outdoor surfaces 300 ft with minimally antalgic gait with least restrictive device, independent up and down 1 flight of steps with step over step pattern and 1 railing, improve right knee strength to at least 4/5   Plan   Treatment/Interventions ADL retraining;Functional transfer training;LE strengthening/ROM; Elevations; Therapeutic exercise;Gait training;Equipment eval/education;Patient/family training   PT Frequency Once a day   Recommendation   PT Discharge Recommendation Home with outpatient rehabilitation  (per Dr Vika Llanes)   Equipment Recommended   (pt reports he has access to a walker or cane if needed)   [address] Mobility Inpatient   Turning in Bed Without Bedrails 4   Lying on Back to Sitting on Edge of Flat Bed 4   Moving Bed to Chair 4   Standing Up From Chair 4   Walk in Room 3   Climb 3-5 Stairs 3   Basic Mobility Inpatient Raw Score 22   Basic Mobility Standardized Score 47 4   Barthel Index   Feeding 10   Bathing 5   Grooming Score 5   Dressing Score 10   Bladder Score 10   Bowels Score 10   Toilet Use Score 10   Transfers (Bed/Chair) Score 15   Mobility (Level Surface) Score 0   Stairs Score 5   Barthel Index Score [facility]   Licensure   [place] License Number  Jaqueline Padgett PT  [id number]       Time BK:9723  Time VBX:6878  Total Time: 10      S: "My knee is sore the more I walk"  O:  Gait training with rolling walker and with cane x 30 feet each  Pt educated to perform R knee ROM ad joi focusing on end ranges and to use either the cane or walker to allow for non antalgic gait  A:  Quality of gait improved most with walker  P:  Continue PT to work on R knee ROM/strength and gait on level surfaces and stairs      Ryley Rodriguez, PT

## 2021-05-07 NOTE — PROGRESS NOTES
Progress Note - Infectious Disease   Di Lalo 59 y o  male MRN: [id number]  Unit/Bed#: 2 [place] [address] Encounter: [id number]    Impression/Recommendations:  · Septic arthritis right knee:  Concern for Gram-positive infection such as Staphylococcus aureus or Streptococcus species  Initial synovial fluid analysis demonstrated WBC 89,000  Synovial fluid cx, however, are negative to date  Pt reports that his cat playfully scratches him from time to time when he pets its belly  Most of the scratches are superficial and generally on his hands, but the cat sometimes scratches his legs when he walks by it  05/05/2021:  Status post right knee arthroscopy with irrigation and debridement of septic arthritis  Operative findings included right knee effusion with rancho pus   ? Follow synovial fluid cultures: ngtd  ? Follow MRSA nares screen as patient reports a prior history of skin boils  ? Check Bartonella serology  ? Spoke with micro lab and requested subculture plating to check for Bartonella   ? Continue vancomycin IV pending culture results  ? If cultures remain negative by tomorrow, suggest transition to linezolid 600 mg p o  q 12 hours and azithromycin 500 mg p o  daily until May 19th  ? Labs next week: CBC diff, creatinine  ? Monitor clinical response, temperature curve  · Leukocytosis:  Likely due to right knee septic arthritis  WBC now decreasing  ? CBC next week  · Diabetes mellitus type 2 with mild hyperglycemia:  Noted HbA1c of 6 2 on 04/10/2021  This is a risk factor for infection and delayed wound healing  ? Glycemic control per primary team  · History of amoxicillin allergy:  Rash  · Cigarette smoking  ? Encourage smoking cessation     My recommendations were discussed with the patient verbalized understanding  My recommendations were discussed with orthopedic INEZ Espinoza from the primary team  The ID service will follow      Antibiotics: vancomycin iv since 5/5/21  Scheduled Meds:  Current Facility-Administered Medications   Medication Dose Route Frequency Provider Last Rate    acetaminophen  650 mg Oral Q6H PRN Agee Saucer, PA-C      amLODIPine  10 mg Oral Daily Agee Saucer, PA-JEROD      atorvastatin  20 mg Oral Daily [facility], BILL      enoxaparin  40 mg Subcutaneous Daily [facility]      insulin glargine  25 Units Subcutaneous QAM Zion Prophet, CRNP      insulin lispro  1-5 Units Subcutaneous HS Zion Prophet, CRNP      insulin lispro  1-6 Units Subcutaneous TID AC Zion Prophet, CRNP      lactated ringers  75 mL/hr Intravenous Continuous Agee Saucer, PA-C 75 mL/hr (21)    lisinopril  20 mg Oral Daily Agee Saucer, PA-C      metoprolol tartrate  50 mg Oral Daily Agee Saucer, PA-C      naloxone  0 04 mg Intravenous Q1MIN PRN Agee Saucer, PA-C      nicotine  1 patch Transdermal Daily Agee Saucer, PA-C      ondansetron  4 mg Intravenous Q6H PRN Agee Saucer, PA-C      oxyCODONE  10 mg Oral Q4H PRN Agee Saucer, PA-C      oxyCODONE-acetaminophen  1 tablet Oral Q4H PRN Agee Saucer, PA-C      vancomycin  20 mg/kg Intravenous Q12H Agee Saucer, PA-C 1,750 mg (21)     Continuous Infusions:lactated ringers, 75 mL/hr, Last Rate: 75 mL/hr (21)      PRN Meds:   acetaminophen    naloxone    ondansetron    oxyCODONE    oxyCODONE-acetaminophen    Subjective:  Patient says he feels better and his RT knee pain is improving  He was able to walk with PT today without too much difficulty  He had a low grade fever of 100 7 F yesterday  He denies chills or sweats  He is tolerating vancomycin IV without nausea, vomiting, diarrhea, or rash       Objective:  Vitals:  Temp:  [98 8 °F (37 1 °C)-100 7 °F (38 2 °C)] 99 2 °F (37 3 °C)  HR:  [86-92] 88  Resp:  [17-18] 18  BP: (113-132)/(56-74) 132/74  SpO2:  [90 %-94 %] 90 %  Temp (24hrs), Av 5 °F (37 5 °C), Min:98 8 °F (37 1 °C), Max:100 7 °F (38 2 °C)  Current: Temperature: 99 2 °F (37 3 °C)  Physical Exam:   General Appearance:  Alert, awake, nontoxic, no acute distress   ENT: Oropharynx moist without lesions   Lungs:   Clear to auscultation bilaterally; respirations unlabored   Heart:  S1, S2, RRR, no murmur   Abdomen:   Soft, non-tender, non-distended   : No Loco catheter present   Extremities: RT knee dressing and Ace wrap intact, LAURA drain noted with minimal bloody drainage  No distal leg edema b/l   Neurologic: AAO to person, surroundings, conversant, fluent speech   Skin: No rash     Labs, Cultures, Imaging, & Other studies:   All pertinent labs, cultures and imaging studies were personally reviewed  Results from last 7 days   Lab Units 05/07/21  0503 05/06/21  0657 05/05/21  0816   WBC Thousand/uL 14 51* 18 56* 15 98*   HEMOGLOBIN g/dL 13 0 13 3 14 2   PLATELETS Thousands/uL 245 249 266     Results from last 7 days   Lab Units 05/07/21  0503 05/06/21  0657 05/05/21  0816   SODIUM mmol/L 140 140 137   POTASSIUM mmol/L 3 3* 3 4* 3 3*   CHLORIDE mmol/L 106 105 100   CO2 mmol/L 24 24 24   BUN mg/dL 14 12 12   CREATININE mg/dL 0 73 0 75 0 82   EGFR ml/min/1 73sq m 99 98 94   CALCIUM mg/dL 8 1* 8 2* 9 4   AST U/L  --  18 16   ALT U/L  --  33 13   ALK PHOS U/L  --  62 61 0     Results from last 7 days   Lab Units 05/06/21  1914 05/06/21  1910 05/05/21  1849 05/05/21  0944   BLOOD CULTURE  Received in Microbiology Lab  Culture in Progress  Received in Microbiology Lab  Culture in Progress  --   --    GRAM STAIN RESULT   --   --  4+ Polys  No organisms seen 3+ Polys  No bacteria seen   BODY FLUID CULTURE, STERILE   --   --  No growth No growth     Imaging Studies:   5/5/21 RT knee XR:  Right knee arthritis, small effusion  I have personally reviewed pertinent imaging study reports

## 2021-05-07 NOTE — PROGRESS NOTES
Consultation - Orthopedics   Guillermina Cole 59 y o  male MRN: [id number]  Unit/Bed#: 2 [place] [address] Encounter: [id number]          HPI:   Guillermina Cole is a 59y o  year old male status post arthroscopic washout for septic right knee performed 2 day ago  LAURA drain has been in place and has still be draining  He notes slightly more soreness about his right knee today, however he thinks this is likely due to being more active  He has been up walking to the bathroom  He notes better ROM of the knee today  He notes good sensation of the right lower extremity  WBC are trending down and are 14 today  There has still be no growth on cultures  I did speak to Infectious disease last night and they had advised continue vancomycin until culture growth  Review of Systems   Constitutional: Negative  Negative for chills and fever  HENT: Negative  Negative for ear pain and sore throat  Eyes: Negative  Negative for pain and redness  Respiratory: Negative  Negative for shortness of breath and wheezing  Cardiovascular: Negative for chest pain and palpitations  Gastrointestinal: Negative  Negative for abdominal pain and blood in stool  Endocrine: Negative  Negative for polydipsia and polyuria  Genitourinary: Negative  Negative for difficulty urinating and dysuria  Musculoskeletal:        As noted in HPI   Skin: Negative  Negative for pallor and rash  Neurological: Negative  Negative for dizziness and numbness  Hematological: Negative  Negative for adenopathy  Does not bruise/bleed easily  Psychiatric/Behavioral: Negative  Negative for confusion and suicidal ideas         Historical Information   Past Medical History:   Diagnosis Date    Diabetes mellitus ([facility] [address] )     Hyperlipidemia     Hypertension     Squamous cell carcinoma of leg, right      Past Surgical History:   Procedure Laterality Date    CHOLECYSTECTOMY      HAND TENDON SURGERY      SPLENECTOMY, PARTIAL  1968    accident   [place] TONSILLECTOMY      WOUND DEBRIDEMENT Right 5/5/2021    Procedure: RIGHT KNEE ARTHROSCOPY W/IRRIGATION AND DEBRIDEMENT OF SEPTIC ARTHRITIS;  Surgeon: Dylan Rosas MD;  Location: [facility];  Service: Orthopedics     Social History   Social History     Substance and Sexual Activity   Alcohol Use Yes    Frequency: 2-4 times a month    Drinks per session: 1 or 2     Social History     Substance and Sexual Activity   Drug Use Never     Social History     Tobacco Use   Smoking Status Current Every Day Smoker    Packs/day: 1 00    Years: 45 00    Pack years: 45 00    Types: Cigarettes   Smokeless Tobacco Never Used     Family History:   Family History   Problem Relation Age of Onset    Ovarian cancer Mother     Cancer Mother     Kidney disease Mother     Coronary artery disease Father     Diabetes Father     Cancer Brother        Meds/Allergies   all current active meds have been reviewed and current meds:   Current Facility-Administered Medications   Medication Dose Route Frequency    acetaminophen (TYLENOL) tablet 650 mg  650 mg Oral Q6H PRN    amLODIPine (NORVASC) tablet 10 mg  10 mg Oral Daily    atorvastatin (LIPITOR) tablet 20 mg  20 mg Oral Daily    enoxaparin (LOVENOX) subcutaneous injection 40 mg  40 mg Subcutaneous Daily    insulin glargine (LANTUS) subcutaneous injection 25 Units 0 25 mL  25 Units Subcutaneous QAM    insulin lispro (HumaLOG) 100 units/mL subcutaneous injection 1-5 Units  1-5 Units Subcutaneous HS    insulin lispro (HumaLOG) 100 units/mL subcutaneous injection 1-6 Units  1-6 Units Subcutaneous TID AC    lactated ringers infusion  75 mL/hr Intravenous Continuous    lisinopril (ZESTRIL) tablet 20 mg  20 mg Oral Daily    metoprolol tartrate (LOPRESSOR) tablet 50 mg  50 mg Oral Daily    naloxone (NARCAN) 0 04 mg/mL syringe 0 04 mg  0 04 mg Intravenous Q1MIN PRN    nicotine (NICODERM CQ) 21 mg/24 hr TD 24 hr patch 1 patch  1 patch Transdermal Daily    ondansetron [facility] injection 4 mg  4 mg Intravenous Q6H PRN    oxyCODONE (ROXICODONE) IR tablet 10 mg  10 mg Oral Q4H PRN    oxyCODONE-acetaminophen (PERCOCET) 5-325 mg per tablet 1 tablet  1 tablet Oral Q4H PRN    vancomycin (VANCOCIN) 1,750 mg in sodium chloride 0 9 % 500 mL IVPB  20 mg/kg Intravenous Q12H     Allergies   Allergen Reactions    Amoxicillin Rash    Amoxicillin-Pot Clavulanate Rash       Objective   Vitals: Blood pressure 132/74, pulse 88, temperature 99 2 °F (37 3 °C), temperature source Oral, resp  rate 18, height 5' 9" (1 753 m), weight 88 5 kg (195 lb), SpO2 90 %  ,Body mass index is 28 8 kg/m²  Invasive Devices     Peripheral Intravenous Line            Peripheral IV 05/05/21 Right Forearm 2 days          Drain            Closed/Suction Drain Right Leg Bulb 10 Fr  1 day                Physical Exam  Constitutional:       General: He is not in acute distress  Appearance: He is well-developed  HENT:      Head: Normocephalic and atraumatic  Eyes:      General: No scleral icterus  Conjunctiva/sclera: Conjunctivae normal    Neck:      Vascular: No JVD  Cardiovascular:      Rate and Rhythm: Normal rate  Pulmonary:      Effort: Pulmonary effort is normal  No respiratory distress  Skin:     General: Skin is warm  Neurological:      Mental Status: He is alert and oriented to person, place, and time  Coordination: Coordination normal           Right Knee Exam     Tenderness   The patient is experiencing no tenderness  Range of Motion   Extension: 0 (without pain)   Flexion: 90 (without pain)     Other   Sensation: normal    Comments:  LAURA drain in place  Minimal serosanguinous fluid though this was just emptied this morning  Lab Results: I have personally reviewed pertinent lab results    CBC:   Lab Results   Component Value Date    WBC 14 51 (H) 05/07/2021    HGB 13 0 05/07/2021    HCT 39 7 05/07/2021    MCV 85 05/07/2021     05/07/2021    MCH 27 7 05/07/2021 MCHC 32 7 05/07/2021    RDW 15 9 (H) 05/07/2021    MPV 10 3 05/07/2021    NRBC 0 05/06/2021     CMP:   Lab Results   Component Value Date     05/07/2021    CO2 24 05/07/2021    BUN 14 05/07/2021    CREATININE 0 73 05/07/2021    CALCIUM 8 1 (L) 05/07/2021    AST 18 05/06/2021    ALT 33 05/06/2021    ALKPHOS 62 05/06/2021    EGFR 99 05/07/2021     PT/INR: No results found for: PT, INR      Assessment:  Status post right knee arthroscopic I&D for septic arthritis     Plan:  Continue LAURA drain  May pull later today or tomorrow if output decreases significantly  He may continue to WBAT  Continue Vanco pending cultures results  Continue to monitor WBCs which are trending down         Code Status: Level 1 - Full Code  Advance Directive and Living Will:      Power of :    POLST:

## 2021-05-07 NOTE — UTILIZATION REVIEW
Inpatient Admission Authorization Request   NOTIFICATION OF INPATIENT ADMISSION/INPATIENT AUTHORIZATION REQUEST   SERVICING FACILITY:   [place] [address]  Tax ID: [id number]  NPI: [id number]  Place of [facility]: [facility]patient [facility]  [address]  Place of Service Code: 24     ATTENDING PROVIDER:  Attending Name and NPI#: Rodrigo Hernandez [[id number]]  Address: [address] [place] [address]  Phone: [phone]     UTILIZATION REVIEW CONTACT:  [facility] Utilization   Network Utilization Review Department  Phone: [phone]  Fax [phone]  Email: [email or web address]     PHYSICIAN ADVISORY SERVICES:  FOR [id number]-CE-YQFD REVIEW - MEDICAL NECESSITY DENIAL  Phone: [phone]  Fax: [phone]  Email: [email or web address]     TYPE OF REQUEST:  Inpatient Status     ADMISSION INFORMATION:  ADMISSION DATE/TIME: 5/5/21 1652  PATIENT DIAGNOSIS CODE/DESCRIPTION:  septic knee  DISCHARGE DATE/TIME: No discharge date for patient encounter  DISCHARGE DISPOSITION (IF DISCHARGED): [address]     IMPORTANT INFORMATION:  Please contact the Lorna [facility] directly with any questions or concerns regarding this request  Department voicemails are confidential     Send requests for admission clinical reviews, concurrent reviews, approvals, and administrative denials due to lack of clinical to fax [phone]

## 2021-05-07 NOTE — PROGRESS NOTES
[facility] Internal Medicine Progress Note  Patient: Livia Carmichael 59 y o  male   MRN: [id number]  PCP: Xiao Avila MD  Unit/Bed#: [address] [place] [address] Encounter: [id number]  Date Of Visit: 05/07/21    Primary Service: Yahaira Mckenna MD  Reason for Consultation: Medical managment    Problem List:    Principal Problem:    Septic arthritis of knee, right ([facility] )  Active Problems:    Type 2 diabetes mellitus, with long-term current use of insulin ([facility] )    Essential hypertension    Hyperlipidemia    Smoking      Assessment & Plan:    * Septic arthritis of knee, right [facility])  Assessment & Plan  Patient presented with right knee pain, evaluated by orthopedics  Patient denies any trauma to the right knee, 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  He indicated that he had some pain in the right knee and it got progressively worse over 12 hours to the point where he could not bear weight on it  Patient was taken to the OR for washout of septic arthritis of right knee  Fluid culture shows no growth so far  LAURA drain present, noted serosanguineous fluid in collection bulb  Continue on IV vancomycin  Blood cultures pending  Reports that his pain is being well controlled  CBC in a m  Smoking  Assessment & Plan  Smoking cessation counseled  Patient refused Nicotine patch  Hyperlipidemia  Assessment & Plan  Continue Lipitor 20 mg p o  Daily  Heart healthy diet      Essential hypertension  Assessment & Plan  continue Norvasc 10 mg p o  Daily, lisinopril 20 mg p o  Daily, metoprolol 50 mg daily  Monitor BPs  Type 2 diabetes mellitus, with long-term current use of insulin [facility])  Assessment & Plan  Lab Results   Component Value Date    HGBA1C 6 2 (H) 04/10/2021       Recent Labs     05/06/21  2103 05/07/21  0718 05/07/21  1121 05/07/21  1558   POCGLU 115 85 127 105     Continue Lantus 25 units daily in a m   Along with sliding scale coverage a c  And HS  Hold metformin 1000 mg b i d  At this time  Diabetic diet        VTE Pharmacologic Prophylaxis:   Pharmacologic: Enoxaparin (Lovenox)  Mechanical:  Yes    Was care plan discussed with the Primary service today?: Yes    Education and Discussions with Family / Patient: Yes    Time Spent for Care: 30 minutes  More than 50% of total time spent on counseling and coordination of care as described above  Is patient acceptable for discharge from medicine standpoint?: No  Discharge Recommendations: N/A    Subjective:     Patient states pain feels better today  He was able to walk with physical therapy    Objective:     Vitals:   Temp (24hrs), Av 5 °F (37 5 °C), Min:98 8 °F (37 1 °C), Max:100 7 °F (38 2 °C)    Temp:  [98 8 °F (37 1 °C)-100 7 °F (38 2 °C)] 99 2 °F (37 3 °C)  HR:  [86-92] 88  Resp:  [17-18] 18  BP: (113-132)/(56-74) 132/74  SpO2:  [90 %-94 %] 90 %  Body mass index is 28 8 kg/m²  Input and Output Summary (last 24 hours): Intake/Output Summary (Last 24 hours) at 2021 1028  Last data filed at 2021 0531  Gross per 24 hour   Intake 960 ml   Output 3105 ml   Net -2145 ml       Physical Exam:     Physical Exam  Constitutional:       General: He is not in acute distress  Appearance: He is well-developed  He is not diaphoretic  HENT:      Head: Normocephalic and atraumatic  Eyes:      General: No scleral icterus  Cardiovascular:      Rate and Rhythm: Normal rate and regular rhythm  Pulmonary:      Effort: Pulmonary effort is normal  No respiratory distress  Breath sounds: Normal breath sounds  No wheezing or rales  Abdominal:      General: Bowel sounds are normal  There is no distension  Palpations: Abdomen is soft  Tenderness: There is no abdominal tenderness  Musculoskeletal:      Comments: Right knee with Ace wrap in place    LAURA drain with small amount of serosanguineous drainage   Neurological:      Mental Status: He is alert and oriented to person, place, and time  Additional Data:     Labs:    Results from last 7 days   Lab Units 05/07/21  0503 05/06/21  0657   WBC Thousand/uL 14 51* 18 56*   HEMOGLOBIN g/dL 13 0 13 3   HEMATOCRIT % 39 7 40 3   PLATELETS Thousands/uL 245 249   NEUTROS PCT %  --  79*   LYMPHS PCT %  --  8*   MONOS PCT %  --  12   EOS PCT %  --  0     Results from last 7 days   Lab Units 05/07/21  0503 05/06/21  0657   POTASSIUM mmol/L 3 3* 3 4*   CHLORIDE mmol/L 106 105   CO2 mmol/L 24 24   BUN mg/dL 14 12   CREATININE mg/dL 0 73 0 75   CALCIUM mg/dL 8 1* 8 2*   ALK PHOS U/L  --  62   ALT U/L  --  33   AST U/L  --  18           * I Have Reviewed All Lab Data Listed Above  * Additional Pertinent Lab Tests Reviewed: All Labs For Current Hospital Admission Reviewed    Imaging:    Xr Knee 4+ Vw Right Injury    Result Date: 5/5/2021  Narrative: RIGHT KNEE INDICATION:   Trauma  Right knee pain, edema COMPARISON:  None VIEWS:  XR KNEE 4+ VW RIGHT INJURY FINDINGS: There is no acute fracture or dislocation  There is a small joint effusion  Mild right knee osteoarthritis, mild narrowing of the medial joint compartment No lytic or blastic osseous lesion  Chondrocalcinosis is present  There is suspicion of a loose body along the posterior joint line measuring 10 mm     Impression: Right knee arthritis, small effusion, chondrocalcinosis and suspected loose body No acute osseous abnormality The examination demonstrates a significant  finding and was documented as such in [facility] for liaison and referring practitioner notification  Workstation performed: [id number]     Imaging Reports Reviewed by myself    Cultures:   Blood Culture:   Lab Results   Component Value Date    BLOODCX Received in Microbiology Lab  Culture in Progress  05/06/2021    BLOODCX Received in Microbiology Lab  Culture in Progress   05/06/2021     Urine Culture: No results found for: URINECX  Sputum Culture: No components found for: SPUTUMCX  Wound Culture: No results found for: WOUNDCULT    Last 24 Hours Medication List:   Current Facility-Administered Medications   Medication Dose Route Frequency Provider Last Rate    acetaminophen  650 mg Oral Q6H PRN Feliciano Bussing, PA-C      amLODIPine  10 mg Oral Daily Feliciano Bussing, PA-C      atorvastatin  20 mg Oral Daily Feliciano Bussing, PA-C      enoxaparin  40 mg Subcutaneous Daily Feliciano [place], [place]      insulin glargine  25 Units Subcutaneous QAM BALDOMERO Jackson      insulin lispro  1-5 Units Subcutaneous HS BALDOMERO Jackson      insulin lispro  1-6 Units Subcutaneous TID AC BALDOMERO Jackson      lactated ringers  75 mL/hr Intravenous Continuous Feliciano Bussing, PA-C 75 mL/hr (05/05/21 2046)    lisinopril  20 mg Oral Daily Feliciano Bussing, PA-C      metoprolol tartrate  50 mg Oral Daily Feliciano Bussing, PA-C      naloxone  0 04 mg Intravenous Q1MIN PRN Feliciano Bussing, PA-C      nicotine  1 patch Transdermal Daily Feliciano Bussing, PA-C      ondansetron  4 mg Intravenous Q6H PRN Feliciano Bussing, PA-C      oxyCODONE  10 mg Oral Q4H PRN Feliciano Bussing, PA-C      oxyCODONE-acetaminophen  1 tablet Oral Q4H PRN Feliciano Bussing, PA-C      potassium chloride  40 mEq Oral Once Yandy RevankDO rajwinder      vancomycin  20 mg/kg Intravenous Q12H Feliciano Bussing, PA-C 1,750 mg (05/07/21 0019)        Today, Patient Was Seen By: Alfredo Razo DO    ** Please Note: "This note has been constructed using a voice recognition system  Therefore there may be syntax, spelling, and/or grammatical errors   Please call if you have any questions  "**

## 2021-05-07 NOTE — PROGRESS NOTES
Vancomycin IV Pharmacy-to-Dose Consultation    Jeanette Pineda is a 59 y o  male who is currently receiving Vancomycin IV with management by the Pharmacy Consult service  Assessment/Plan:  The patient was reviewed  Renal function is stable and no signs or symptoms of nephrotoxicity and/or infusion reactions were documented in the chart  Based on todays assessment, continue current vancomycin (day # 3) dosing of 1750 mg IV q12h, with a plan for trough to be drawn at 1230 on 05/08/2021  We will continue to follow the patients culture results and clinical progress daily      Philip Graham, Pharmacist

## 2021-05-07 NOTE — PROGRESS NOTES
[address]  coding opportunities          Chart reviewed, no opportunity found: CHART REVIEWED, NO OPPORTUNITY FOUND              Patients insurance company: [employer] (Medicare Advantage and Commercial)           PT CURRENTLY ADMITTED

## 2021-05-08 ENCOUNTER — APPOINTMENT (INPATIENT)
Dept: RADIOLOGY | Facility: HOSPITAL | Age: 61
DRG: 489 | End: 2021-05-08
Payer: COMMERCIAL

## 2021-05-08 PROBLEM — G47.33 OSA (OBSTRUCTIVE SLEEP APNEA): Status: ACTIVE | Noted: 2021-05-08

## 2021-05-08 LAB
ANION GAP SERPL CALCULATED.3IONS-SCNC: 9 MMOL/L (ref 4–13)
BUN SERPL-MCNC: 12 MG/DL (ref 5–25)
CALCIUM SERPL-MCNC: 7.9 MG/DL (ref 8.3–10.1)
CHLORIDE SERPL-SCNC: 105 MMOL/L (ref 100–108)
CO2 SERPL-SCNC: 25 MMOL/L (ref 21–32)
CREAT SERPL-MCNC: 0.77 MG/DL (ref 0.6–1.3)
ERYTHROCYTE [DISTWIDTH] IN BLOOD BY AUTOMATED COUNT: 15.7 % (ref 11.6–15.1)
GFR SERPL CREATININE-BSD FRML MDRD: 97 ML/MIN/1.73SQ M
GLUCOSE SERPL-MCNC: 107 MG/DL (ref 65–140)
GLUCOSE SERPL-MCNC: 110 MG/DL (ref 65–140)
GLUCOSE SERPL-MCNC: 140 MG/DL (ref 65–140)
GLUCOSE SERPL-MCNC: 83 MG/DL (ref 65–140)
GLUCOSE SERPL-MCNC: 88 MG/DL (ref 65–140)
GLUCOSE SERPL-MCNC: 97 MG/DL (ref 65–140)
HCT VFR BLD AUTO: 39.6 % (ref 36.5–49.3)
HGB BLD-MCNC: 13.2 G/DL (ref 12–17)
MAGNESIUM SERPL-MCNC: 1.8 MG/DL (ref 1.6–2.6)
MCH RBC QN AUTO: 28 PG (ref 26.8–34.3)
MCHC RBC AUTO-ENTMCNC: 33.3 G/DL (ref 31.4–37.4)
MCV RBC AUTO: 84 FL (ref 82–98)
PLATELET # BLD AUTO: 251 THOUSANDS/UL (ref 149–390)
PMV BLD AUTO: 9.6 FL (ref 8.9–12.7)
POTASSIUM SERPL-SCNC: 3.4 MMOL/L (ref 3.5–5.3)
RBC # BLD AUTO: 4.72 MILLION/UL (ref 3.88–5.62)
SODIUM SERPL-SCNC: 139 MMOL/L (ref 136–145)
VANCOMYCIN TROUGH SERPL-MCNC: <0.8 UG/ML (ref 10–20)
WBC # BLD AUTO: 11.92 THOUSAND/UL (ref 4.31–10.16)

## 2021-05-08 PROCEDURE — 83735 ASSAY OF MAGNESIUM: CPT | Performed by: FAMILY MEDICINE

## 2021-05-08 PROCEDURE — 99024 POSTOP FOLLOW-UP VISIT: CPT | Performed by: ORTHOPAEDIC SURGERY

## 2021-05-08 PROCEDURE — 82948 REAGENT STRIP/BLOOD GLUCOSE: CPT

## 2021-05-08 PROCEDURE — 85027 COMPLETE CBC AUTOMATED: CPT | Performed by: FAMILY MEDICINE

## 2021-05-08 PROCEDURE — 97110 THERAPEUTIC EXERCISES: CPT

## 2021-05-08 PROCEDURE — 71045 X-RAY EXAM CHEST 1 VIEW: CPT

## 2021-05-08 PROCEDURE — 80202 ASSAY OF VANCOMYCIN: CPT | Performed by: PHYSICIAN ASSISTANT

## 2021-05-08 PROCEDURE — 80048 BASIC METABOLIC PNL TOTAL CA: CPT | Performed by: FAMILY MEDICINE

## 2021-05-08 PROCEDURE — 97116 GAIT TRAINING THERAPY: CPT

## 2021-05-08 PROCEDURE — 99232 SBSQ HOSP IP/OBS MODERATE 35: CPT | Performed by: FAMILY MEDICINE

## 2021-05-08 RX ORDER — POTASSIUM CHLORIDE 20 MEQ/1
20 TABLET, EXTENDED RELEASE ORAL ONCE
Status: COMPLETED | OUTPATIENT
Start: 2021-05-08 | End: 2021-05-08

## 2021-05-08 RX ORDER — POTASSIUM CHLORIDE 20 MEQ/1
40 TABLET, EXTENDED RELEASE ORAL ONCE
Status: COMPLETED | OUTPATIENT
Start: 2021-05-08 | End: 2021-05-08

## 2021-05-08 RX ADMIN — VANCOMYCIN HYDROCHLORIDE 1750 MG: 10 INJECTION, POWDER, LYOPHILIZED, FOR SOLUTION INTRAVENOUS at 02:00

## 2021-05-08 RX ADMIN — AMLODIPINE BESYLATE 10 MG: 10 TABLET ORAL at 09:14

## 2021-05-08 RX ADMIN — LISINOPRIL 20 MG: 20 TABLET ORAL at 09:14

## 2021-05-08 RX ADMIN — SODIUM CHLORIDE, SODIUM LACTATE, POTASSIUM CHLORIDE, AND CALCIUM CHLORIDE 75 ML/HR: .6; .31; .03; .02 INJECTION, SOLUTION INTRAVENOUS at 02:09

## 2021-05-08 RX ADMIN — POTASSIUM CHLORIDE 40 MEQ: 1500 TABLET, EXTENDED RELEASE ORAL at 09:25

## 2021-05-08 RX ADMIN — INSULIN GLARGINE 25 UNITS: 100 INJECTION, SOLUTION SUBCUTANEOUS at 09:19

## 2021-05-08 RX ADMIN — METOPROLOL TARTRATE 50 MG: 50 TABLET, FILM COATED ORAL at 09:12

## 2021-05-08 RX ADMIN — VANCOMYCIN HYDROCHLORIDE 1750 MG: 10 INJECTION, POWDER, LYOPHILIZED, FOR SOLUTION INTRAVENOUS at 13:38

## 2021-05-08 RX ADMIN — POTASSIUM CHLORIDE 20 MEQ: 1500 TABLET, EXTENDED RELEASE ORAL at 16:55

## 2021-05-08 RX ADMIN — ATORVASTATIN CALCIUM 20 MG: 20 TABLET, FILM COATED ORAL at 09:14

## 2021-05-08 RX ADMIN — ENOXAPARIN SODIUM 40 MG: 40 INJECTION SUBCUTANEOUS at 09:15

## 2021-05-08 NOTE — PROGRESS NOTES
Vancomycin IV Pharmacy-to-Dose Consultation    Dalila Diaz is a 59 y o  male who is currently receiving Vancomycin IV with management by the Pharmacy Consult service  Assessment/Plan:  The patient was reviewed  Renal function is stable and no signs or symptoms of nephrotoxicity and/or infusion reactions were documented in the chart  Based on todays assessment, continue current vancomycin (day #4) dosing of 1750 mg IV q12h, with a plan recheck trough to be drawn at 0000 on 5/9/21  We will continue to follow the patients culture results and clinical progress daily      Sven Mack, Pharmacist

## 2021-05-08 NOTE — PROGRESS NOTES
Orthopedics   Carter Sprain 59 y o  male MRN: [id number]  Unit/Bed#: [address] [address]      Subjective:  59 y o male post operative day 3 right knee arthroscopic I and D for septic joint  Pt doing well  Pain controlled  Has been participating in PT  Denies fever chills      Labs:  0   Lab Value Date/Time    HCT 39 6 05/08/2021 0520    HCT 39 7 05/07/2021 0503    HCT 40 3 05/06/2021 0657    HGB 13 2 05/08/2021 0520    HGB 13 0 05/07/2021 0503    HGB 13 3 05/06/2021 0657    WBC 11 92 (H) 05/08/2021 0520    WBC 14 51 (H) 05/07/2021 0503    WBC 18 56 (H) 05/06/2021 0657       Meds:    Current Facility-Administered Medications:     acetaminophen (TYLENOL) tablet 650 mg, 650 mg, Oral, Q6H PRN, Yakov Joshua, PA-C, 650 mg at 05/06/21 1901    amLODIPine (NORVASC) tablet 10 mg, 10 mg, Oral, Daily, Yakov Joshua, PA-C, 10 mg at 05/08/21 0914    atorvastatin (LIPITOR) tablet 20 mg, 20 mg, Oral, Daily, Yakov Joshua, PA-C, 20 mg at 05/08/21 0914    enoxaparin (LOVENOX) subcutaneous injection 40 mg, 40 mg, Subcutaneous, Daily, Yakov Joshua, PA-C, 40 mg at 05/08/21 0915    insulin glargine (LANTUS) subcutaneous injection 25 Units 0 25 mL, 25 Units, Subcutaneous, QAM, BALDOMERO Zazueta, 25 Units at 05/08/21 0919    insulin lispro (HumaLOG) 100 units/mL subcutaneous injection 1-5 Units, 1-5 Units, Subcutaneous, HS, BALDOMERO Zazueta    insulin lispro (HumaLOG) 100 units/mL subcutaneous injection 1-6 Units, 1-6 Units, Subcutaneous, TID AC **AND** Fingerstick Glucose (POCT), , , TID AC, Rashard Dow, BALDOMERO    lisinopril (ZESTRIL) tablet 20 mg, 20 mg, Oral, Daily, Nestora Josiane, PA-C, 20 mg at 05/08/21 0914    metoprolol tartrate (LOPRESSOR) tablet 50 mg, 50 mg, Oral, Daily, Yakov Joshua PA-C, 50 mg at 05/08/21 0912    naloxone (NARCAN) 0 04 mg/mL syringe 0 04 mg, 0 04 mg, Intravenous, Q1MIN PRN, Yaokv Joshua PA-C    nicotine (NICODERM CQ) 21 mg/24 hr TD 24 hr patch 1 patch, 1 patch, Transdermal, Daily, [place] Si, PA-C    ondansetron [facility]) injection 4 mg, 4 mg, Intravenous, Q6H PRN, Mihaela Si, PA-C    oxyCODONE (ROXICODONE) IR tablet 10 mg, 10 mg, Oral, Q4H PRN, [place] Si, PA-C, 10 mg at 05/07/21 2252    oxyCODONE-acetaminophen (PERCOCET) 5-325 mg per tablet 1 tablet, 1 tablet, Oral, Q4H PRN, [place] Si, PA-C, 1 tablet at 05/07/21 0828    vancomycin (VANCOCIN) 1,750 mg in sodium chloride 0 9 % 500 mL IVPB, 20 mg/kg, Intravenous, Q12H, [place] Si, PA-C, Last Rate: 250 mL/hr at 05/08/21 0200, 1,750 mg at 05/08/21 0200    Blood Culture:   Lab Results   Component Value Date    BLOODCX No Growth at 24 hrs  05/06/2021       Wound Culture:   No results found for: WOUNDCULT    Ins and Outs:  I/O last 24 hours: In: 456 3 [I V :456 3]  Out: 1200 [Urine:1200]          Physical Exam:  Vitals:    05/08/21 0912   BP: 140/77   Pulse: 99   Resp:    Temp:    SpO2:      right lower extremity  Dressing taken down  Drain removed  Mild swelling noted about the knee  Incisions healing well  Compartment soft  Brisk cap refill  Range of motion 0 to approximately 90      Assessment: 61 y o male post operative day 3 right knee arthroscopic washout for septic joint    Cultures pending    Plan:  Weight-bearing as tolerated  Lovenox  Antibiotics per ID, cultures pending  Chest x-ray ordered by hospitalist as patient did have some decreased saturation  Labs improving  PT OT possible DC tomorrow      Juan M Quispe DO

## 2021-05-08 NOTE — ASSESSMENT & PLAN NOTE
Patient noted to be getting hypoxic overnight  Upon further discussion, patient states that he is supposed to be using CPAP q h s  Patient does not remember setting of his CPAP and also does not want me to order any CPAP  Continue supplemental oxygen at bedtime and CPAP at home  Chest x-ray shows opacity in right mid lung likely due to Sujey's granulomatosis  This was discussed with patient and he is aware of this finding from before  it appears that patient was supposed to get CT chest as outpatient    Advised patient to follow up with PCP for further evaluation

## 2021-05-08 NOTE — PROGRESS NOTES
[facility] Medicine Progress Note  Patient: Elie Cordova 59 y o  male   MRN: [id number]  PCP: Tray Licona MD  Unit/Bed#: [address] [place] [address] Encounter: [id number]  Date Of Visit: 05/08/21    Primary Service: Ruba Salazar MD  Reason for Consultation: Medical managment    Problem List:    Principal Problem:    Septic arthritis of knee, right ([facility] )  Active Problems:    Type 2 diabetes mellitus, with long-term current use of insulin ([facility] )    Essential hypertension    Hyperlipidemia    Smoking    AZAR (obstructive sleep apnea)      Assessment & Plan:    * Septic arthritis of knee, right [facility])  Assessment & Plan  Patient presented with right knee pain, evaluated by orthopedics  Patient denies any trauma to the right knee, 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  He indicated that he had some pain in the right knee and it got progressively worse over 12 hours to the point where he could not bear weight on it  Patient was taken to the OR for washout of septic arthritis of right knee  Fluid culture shows no growth so far  LAURA drain removed  Continue on IV vancomycin  Blood cultures negative  Reports that his pain is being well controlled  CBC in a m         AZAR (obstructive sleep apnea)  Assessment & Plan  Patient noted to be getting hypoxic overnight  Upon further discussion, patient states that he is supposed to be using CPAP q h s  Patient does not remember setting of his CPAP and also does not want me to order any CPAP  Continue supplemental oxygen at bedtime    Smoking  Assessment & Plan  Smoking cessation counseled  Patient refused Nicotine patch  Hyperlipidemia  Assessment & Plan  Continue Lipitor 20 mg p o  Daily  Heart healthy diet  Essential hypertension  Assessment & Plan  continue Norvasc 10 mg p o  Daily, lisinopril 20 mg p o  Daily, metoprolol 50 mg daily  BPs stable      Type 2 diabetes mellitus, with long-term current use of insulin [facility])  Assessment & Plan  Lab Results   Component Value Date    HGBA1C 6 2 (H) 04/10/2021       Recent Labs     21  1558 21  2056 21  0729 21  1141   POCGLU 105 110 83 107     Continue Lantus 25 units daily in a m  Along with sliding scale coverage a c  And HS  Holding metformin 1000 mg b i d  At this time  Diabetic diet        VTE Pharmacologic Prophylaxis:   Pharmacologic: Enoxaparin (Lovenox)  Mechanical:  Yes    Was care plan discussed with the Primary service today?: Yes - Dr Nino Ureña    Education and Discussions with Family / Patient: Yes    Time Spent for Care: 25 min  More than 50% of total time spent on counseling and coordination of care as described above  Is patient acceptable for discharge from medicine standpoint?: No  Discharge Recommendations: N/A    Subjective:     Patient states pain is overall well controlled even with ambulation  Reports cough    Objective:     Vitals:   Temp (24hrs), Av 6 °F (37 6 °C), Min:99 2 °F (37 3 °C), Max:99 9 °F (37 7 °C)    Temp:  [99 2 °F (37 3 °C)-99 9 °F (37 7 °C)] 99 9 °F (37 7 °C)  HR:  [87-91] 87  Resp:  [17-18] 18  BP: (131-139)/(80-85) 131/85  SpO2:  [88 %-90 %] 88 %  Body mass index is 28 8 kg/m²  Input and Output Summary (last 24 hours): Intake/Output Summary (Last 24 hours) at 2021 0912  Last data filed at 2021  Gross per 24 hour   Intake 456 25 ml   Output 1200 ml   Net -743 75 ml       Physical Exam:     Physical Exam  Constitutional:       General: He is not in acute distress  Appearance: He is well-developed  He is not diaphoretic  HENT:      Head: Normocephalic and atraumatic  Eyes:      General:         Right eye: No discharge  Left eye: No discharge  Cardiovascular:      Rate and Rhythm: Normal rate and regular rhythm  Pulmonary:      Effort: Pulmonary effort is normal  No respiratory distress        Breath sounds: Normal breath sounds  No wheezing or rales  Abdominal:      General: Bowel sounds are normal  There is no distension  Palpations: Abdomen is soft  Tenderness: There is no abdominal tenderness  Musculoskeletal:      Comments: Right lower extremity with Ace wrap in place  Neurological:      Mental Status: He is alert and oriented to person, place, and time  Additional Data:     Labs:    Results from last 7 days   Lab Units 05/08/21  0520 05/06/21  0657   WBC Thousand/uL 11 92*   < > 18 56*   HEMOGLOBIN g/dL 13 2   < > 13 3   HEMATOCRIT % 39 6   < > 40 3   PLATELETS Thousands/uL 251   < > 249   NEUTROS PCT %  --   --  79*   LYMPHS PCT %  --   --  8*   MONOS PCT %  --   --  12   EOS PCT %  --   --  0    < > = values in this interval not displayed  Results from last 7 days   Lab Units 05/08/21 0520 05/06/21  0657   POTASSIUM mmol/L 3 4*   < > 3 4*   CHLORIDE mmol/L 105   < > 105   CO2 mmol/L 25   < > 24   BUN mg/dL 12   < > 12   CREATININE mg/dL 0 77   < > 0 75   CALCIUM mg/dL 7 9*   < > 8 2*   ALK PHOS U/L  --   --  62   ALT U/L  --   --  33   AST U/L  --   --  18    < > = values in this interval not displayed  * I Have Reviewed All Lab Data Listed Above  * Additional Pertinent Lab Tests Reviewed: All Labs For Current Hospital Admission Reviewed    Imaging:    Xr Knee 4+ Vw Right Injury    Result Date: 5/5/2021  Narrative: RIGHT KNEE INDICATION:   Trauma  Right knee pain, edema COMPARISON:  None VIEWS:  XR KNEE 4+ VW RIGHT INJURY FINDINGS: There is no acute fracture or dislocation  There is a small joint effusion  Mild right knee osteoarthritis, mild narrowing of the medial joint compartment No lytic or blastic osseous lesion  Chondrocalcinosis is present    There is suspicion of a loose body along the posterior joint line measuring 10 mm     Impression: Right knee arthritis, small effusion, chondrocalcinosis and suspected loose body No acute osseous abnormality The examination demonstrates a significant  finding and was documented as such in [facility] for liaison and referring practitioner notification  Workstation performed: [id number]     Imaging Reports Reviewed by myself    Cultures:   Blood Culture:   Lab Results   Component Value Date    BLOODCX No Growth at 24 hrs  05/06/2021    BLOODCX No Growth at 24 hrs  05/06/2021     Urine Culture: No results found for: URINECX  Sputum Culture: No components found for: SPUTUMCX  Wound Culture: No results found for: WOUNDCULT    Last 24 Hours Medication List:   Current Facility-Administered Medications   Medication Dose Route Frequency Provider Last Rate    acetaminophen  650 mg Oral Q6H PRN Joy Hiss, PA-C      amLODIPine  10 mg Oral Daily Joy Hiss, PA-C      atorvastatin  20 mg Oral Daily Joy Hiss, PA-C      enoxaparin  40 mg Subcutaneous Daily [facility], PA-C      insulin glargine  25 Units Subcutaneous QAM BALDOMERO Mora      insulin lispro  1-5 Units Subcutaneous HS Caitlin Arellano, BALDOMERO      insulin lispro  1-6 Units Subcutaneous TID AC BALDOMERO Mor[place]      lisinopril  20 mg Oral Daily [facility], PA-C      metoprolol tartrate  50 mg Oral Daily [facility], PA-C      naloxone  0 04 mg Intravenous Q1MIN PRN [facility], PA-C      nicotine  1 patch Transdermal Daily Joy Hiss, PA-C      ondansetron  4 mg Intravenous Q6H PRN Joy Hiss, PA-C      oxyCODONE  10 mg Oral Q4H PRN [facility], PA-C      oxyCODONE-acetaminophen  1 tablet Oral Q4H PRN [facility], PA-C      potassium chloride  40 mEq Oral Once Yandy Morales DO      vancomycin  20 mg/kg Intravenous Q12H Joy Frank, PA-C 1,750 mg (05/08/21 0200)        Today, Patient Was Seen By: Melissa Hopkins DO    ** Please Note: "This note has been constructed using a voice recognition system  Therefore there may be syntax, spelling, and/or grammatical errors   Please call if you have any questions  "**

## 2021-05-08 NOTE — PHYSICAL THERAPY NOTE
PT TREATMENT     05/08/21 1017   PT Last Visit   PT Visit Date 05/08/21   Note Type   Note Type Treatment   Pain Assessment   Pain Assessment Tool 0-10   Pain Score 4   Pain Location/Orientation Orientation: Right;Location: Knee   Restrictions/Precautions   RLE Weight Bearing Per Order WBAT   Other Precautions Pain   General   Chart Reviewed Yes   Subjective   Subjective "Doing okay I think   Bed Mobility   Supine to Sit 7  Independent   Sit to Supine 7  Independent   Transfers   Sit to Stand 7  Independent   Stand to Sit 7  Independent   Ambulation/Elevation   Gait pattern Antalgic  (Ambulates with slight right knee flexion)   Gait Assistance   (S/I)   Additional items Verbal cues   Assistive Device Rolling walker;SPC   Distance 150 ft with a RW; 125 ft with a cane   Stair Management Assistance   (S/I)   Additional items Verbal cues   Stair Management Technique Two rails; Step to pattern; Alternating pattern   Number of Stairs 4  (x 2)   Balance   Static Sitting Good   Static Standing Good   Dynamic Standing Fair   Ambulatory Fair   Activity Tolerance   Activity Tolerance Patient limited by fatigue;Patient limited by pain   Exercises   Quad Sets 10 reps;Right;Supine   Heelslides 10 reps;Right;Supine   Knee Flexion Stretch 10 reps;Right;Sitting   Knee AROM Long Arc Quad 10 reps;Right;Sitting   Assessment   Assessment Pt with good tolerance to RLE exercise and ambulation with both a RW and a cane  Gait most improved and pt most comfortable with the RW versus a cane at this time  The patient's AM-PAC Basic Mobility Inpatient Short Form Raw Score is 22, Standardized Score is 47 4  A standardized score greater than 42 9 suggests the patient may benefit from discharge to home  Please also refer to the recommendation of the Physical Therapist for safe discharge planning  Plan   Treatment/Interventions ADL retraining;Functional transfer training;LE strengthening/ROM; Elevations; Therapeutic exercise; Endurance training;Patient/family training;Equipment eval/education; Bed mobility;Gait training; Compensatory technique education   PT Frequency Once a day   Recommendation   PT Discharge Recommendation Home with outpatient rehabilitation   [address] Mobility Inpatient   Turning in Bed Without Bedrails 4   Lying on Back to Sitting on Edge of Flat Bed 4   Moving Bed to Chair 4   Standing Up From Chair 4   Walk in Room 3   Climb 3-5 Stairs 3   Basic Mobility Inpatient Raw Score 22   Basic Mobility Standardized Score 70 0   Licensure   [place] License Number  [place]  PT [id number]       Portions of the documentation may have been created using voice recognition software  Occasional wrong word or sound alike substitutions may have occurred due to the inherent limitation of the voice recognition software  Read the chart carefully and recognize, using context, where substitutions have occurred

## 2021-05-09 VITALS
OXYGEN SATURATION: 92 % | DIASTOLIC BLOOD PRESSURE: 79 MMHG | HEIGHT: 69 IN | BODY MASS INDEX: 28.88 KG/M2 | HEART RATE: 94 BPM | SYSTOLIC BLOOD PRESSURE: 134 MMHG | RESPIRATION RATE: 20 BRPM | TEMPERATURE: 98.5 F | WEIGHT: 195 LBS

## 2021-05-09 LAB
ANION GAP SERPL CALCULATED.3IONS-SCNC: 10 MMOL/L (ref 4–13)
BUN SERPL-MCNC: 11 MG/DL (ref 5–25)
CALCIUM SERPL-MCNC: 8.4 MG/DL (ref 8.3–10.1)
CHLORIDE SERPL-SCNC: 105 MMOL/L (ref 100–108)
CO2 SERPL-SCNC: 25 MMOL/L (ref 21–32)
CREAT SERPL-MCNC: 0.86 MG/DL (ref 0.6–1.3)
ERYTHROCYTE [DISTWIDTH] IN BLOOD BY AUTOMATED COUNT: 15.6 % (ref 11.6–15.1)
GFR SERPL CREATININE-BSD FRML MDRD: 92 ML/MIN/1.73SQ M
GLUCOSE SERPL-MCNC: 118 MG/DL (ref 65–140)
GLUCOSE SERPL-MCNC: 118 MG/DL (ref 65–140)
GLUCOSE SERPL-MCNC: 170 MG/DL (ref 65–140)
HCT VFR BLD AUTO: 41.4 % (ref 36.5–49.3)
HGB BLD-MCNC: 13.6 G/DL (ref 12–17)
MCH RBC QN AUTO: 27.6 PG (ref 26.8–34.3)
MCHC RBC AUTO-ENTMCNC: 32.9 G/DL (ref 31.4–37.4)
MCV RBC AUTO: 84 FL (ref 82–98)
MRSA NOSE QL CULT: NORMAL
PLATELET # BLD AUTO: 278 THOUSANDS/UL (ref 149–390)
PMV BLD AUTO: 9.7 FL (ref 8.9–12.7)
POTASSIUM SERPL-SCNC: 3.9 MMOL/L (ref 3.5–5.3)
RBC # BLD AUTO: 4.92 MILLION/UL (ref 3.88–5.62)
SODIUM SERPL-SCNC: 140 MMOL/L (ref 136–145)
VANCOMYCIN TROUGH SERPL-MCNC: 15.8 UG/ML (ref 10–20)
WBC # BLD AUTO: 10.84 THOUSAND/UL (ref 4.31–10.16)

## 2021-05-09 PROCEDURE — 99232 SBSQ HOSP IP/OBS MODERATE 35: CPT | Performed by: FAMILY MEDICINE

## 2021-05-09 PROCEDURE — 80048 BASIC METABOLIC PNL TOTAL CA: CPT | Performed by: FAMILY MEDICINE

## 2021-05-09 PROCEDURE — 82948 REAGENT STRIP/BLOOD GLUCOSE: CPT

## 2021-05-09 PROCEDURE — 99024 POSTOP FOLLOW-UP VISIT: CPT | Performed by: PHYSICIAN ASSISTANT

## 2021-05-09 PROCEDURE — 85027 COMPLETE CBC AUTOMATED: CPT | Performed by: FAMILY MEDICINE

## 2021-05-09 PROCEDURE — 80202 ASSAY OF VANCOMYCIN: CPT | Performed by: PHYSICIAN ASSISTANT

## 2021-05-09 PROCEDURE — 99024 POSTOP FOLLOW-UP VISIT: CPT | Performed by: ORTHOPAEDIC SURGERY

## 2021-05-09 RX ORDER — LINEZOLID 600 MG/1
600 TABLET, FILM COATED ORAL EVERY 12 HOURS SCHEDULED
Qty: 22 TABLET | Refills: 0 | Status: SHIPPED | OUTPATIENT
Start: 2021-05-09 | End: 2021-05-20

## 2021-05-09 RX ORDER — AZITHROMYCIN 500 MG/1
500 TABLET, FILM COATED ORAL EVERY 24 HOURS
Qty: 11 TABLET | Refills: 0 | Status: SHIPPED | OUTPATIENT
Start: 2021-05-09 | End: 2021-05-20

## 2021-05-09 RX ADMIN — ATORVASTATIN CALCIUM 20 MG: 20 TABLET, FILM COATED ORAL at 08:28

## 2021-05-09 RX ADMIN — LISINOPRIL 20 MG: 20 TABLET ORAL at 08:28

## 2021-05-09 RX ADMIN — ENOXAPARIN SODIUM 40 MG: 40 INJECTION SUBCUTANEOUS at 08:27

## 2021-05-09 RX ADMIN — AMLODIPINE BESYLATE 10 MG: 10 TABLET ORAL at 08:28

## 2021-05-09 RX ADMIN — INSULIN GLARGINE 25 UNITS: 100 INJECTION, SOLUTION SUBCUTANEOUS at 08:27

## 2021-05-09 RX ADMIN — INSULIN LISPRO 1 UNITS: 100 INJECTION, SOLUTION INTRAVENOUS; SUBCUTANEOUS at 11:42

## 2021-05-09 RX ADMIN — VANCOMYCIN HYDROCHLORIDE 1750 MG: 10 INJECTION, POWDER, LYOPHILIZED, FOR SOLUTION INTRAVENOUS at 08:27

## 2021-05-09 RX ADMIN — METOPROLOL TARTRATE 50 MG: 50 TABLET, FILM COATED ORAL at 08:28

## 2021-05-09 NOTE — PROGRESS NOTES
[facility] Progress Note  Patient: Rona Antony 59 y o  male   MRN: [id number]  PCP: Patrick Zaragoza MD  Unit/Bed#: [address] [place] [address] Encounter: [id number]  Date Of Visit: 05/09/21    Primary Service: Yong Herrera MD  Reason for Consultation: Medical managment    Problem List:    Principal Problem:    Septic arthritis of knee, right ([facility] )  Active Problems:    Type 2 diabetes mellitus, with long-term current use of insulin ([facility] )    Essential hypertension    Hyperlipidemia    Smoking    AZAR (obstructive sleep apnea)      Assessment & Plan:    * Septic arthritis of knee, right [facility])  Assessment & Plan  Patient presented with right knee pain, evaluated by orthopedics  Patient denies any trauma to the right knee, 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  He indicated that he had some pain in the right knee and it got progressively worse over 12 hours to the point where he could not bear weight on it  Patient was taken to the OR for washout of septic arthritis of right knee  Fluid culture shows no growth so far  LAURA drain was removed  Continue on IV vancomycin and change to Zyvox and Zithromax on discharge  Blood cultures negative  Reports that his pain is being well controlled  Leukocytosis trended down        AZAR (obstructive sleep apnea)  Assessment & Plan  Patient noted to be getting hypoxic overnight  Upon further discussion, patient states that he is supposed to be using CPAP q h s  Patient does not remember setting of his CPAP and also does not want me to order any CPAP  Continue supplemental oxygen at bedtime and CPAP at home  Chest x-ray shows opacity in right mid lung likely due to Sujey's granulomatosis  This was discussed with patient and he is aware of this finding from before  it appears that patient was supposed to get CT chest as outpatient    Advised patient to follow up with PCP for further evaluation    Smoking  Assessment & Plan  Smoking cessation counseled  Patient refused Nicotine patch      Hyperlipidemia  Assessment & Plan  Continue Lipitor 20 mg p o  Daily  Heart healthy diet      Essential hypertension  Assessment & Plan  continue Norvasc 10 mg p o  Daily, lisinopril 20 mg p o  Daily, metoprolol 50 mg daily  BPs stable    Type 2 diabetes mellitus, with long-term current use of insulin [facility])  Assessment & Plan  Lab Results   Component Value Date    HGBA1C 6 2 (H) 04/10/2021       Recent Labs     21  1617 21  2050 21  0733 21  1119   POCGLU 97 140 118 170*     Continue Lantus 25 units daily in a m  Along with sliding scale coverage a c  And HS while here  Holding metformin 1000 mg b i d  At this time  Diabetic diet  Resume home meds on discharge        VTE Pharmacologic Prophylaxis:   Pharmacologic: Enoxaparin (Lovenox)  Mechanical:  Yes    Was care plan discussed with the Primary service today?: Yes - Dr Ale Crawley    Education and Discussions with Family / Patient: Yes    Time Spent for Care: 30 minutes  More than 50% of total time spent on counseling and coordination of care as described above  Is patient acceptable for discharge from medicine standpoint?: Yes  Discharge Recommendations:  Continue home medications    Subjective:     Patient states he feels well and is ready to go home  Denies any pain to the right knee    Objective:     Vitals:   Temp (24hrs), Av 1 °F (37 3 °C), Min:98 4 °F (36 9 °C), Max:100 1 °F (37 8 °C)    Temp:  [98 4 °F (36 9 °C)-100 1 °F (37 8 °C)] 98 5 °F (36 9 °C)  HR:  [85-94] 94  Resp:  [20-24] 20  BP: (129-134)/(77-80) 134/79  SpO2:  [88 %-95 %] 92 %  Body mass index is 28 8 kg/m²  Input and Output Summary (last 24 hours):        Intake/Output Summary (Last 24 hours) at 2021 1024  Last data filed at 2021 0605  Gross per 24 hour   Intake 2500 ml   Output 1350 ml   Net 1150 ml       Physical Exam:     Physical Exam  Constitutional:       General: He is not in acute distress  Appearance: He is well-developed  He is not diaphoretic  HENT:      Head: Normocephalic and atraumatic  Eyes:      General: No scleral icterus  Cardiovascular:      Rate and Rhythm: Normal rate and regular rhythm  Heart sounds: Normal heart sounds  Pulmonary:      Effort: Pulmonary effort is normal  No respiratory distress  Breath sounds: Normal breath sounds  No wheezing or rales  Abdominal:      General: Bowel sounds are normal  There is no distension  Palpations: Abdomen is soft  Tenderness: There is no abdominal tenderness  Musculoskeletal:      Comments: Right knee with Ace wrap in place, nontender   Neurological:      Mental Status: He is alert and oriented to person, place, and time  Additional Data:     Labs:    Results from last 7 days   Lab Units 05/09/21  0531  05/06/21  0657   WBC Thousand/uL 10 84*   < > 18 56*   HEMOGLOBIN g/dL 13 6   < > 13 3   HEMATOCRIT % 41 4   < > 40 3   PLATELETS Thousands/uL 278   < > 249   NEUTROS PCT %  --   --  79*   LYMPHS PCT %  --   --  8*   MONOS PCT %  --   --  12   EOS PCT %  --   --  0    < > = values in this interval not displayed  Results from last 7 days   Lab Units 05/09/21  0531  05/06/21  0657   POTASSIUM mmol/L 3 9   < > 3 4*   CHLORIDE mmol/L 105   < > 105   CO2 mmol/L 25   < > 24   BUN mg/dL 11   < > 12   CREATININE mg/dL 0 86   < > 0 75   CALCIUM mg/dL 8 4   < > 8 2*   ALK PHOS U/L  --   --  62   ALT U/L  --   --  33   AST U/L  --   --  18    < > = values in this interval not displayed  * I Have Reviewed All Lab Data Listed Above  * Additional Pertinent Lab Tests Reviewed: All Labs For Current Hospital Admission Reviewed    Imaging:    Xr Knee 4+ Vw Right Injury    Result Date: 5/5/2021  Narrative: RIGHT KNEE INDICATION:   Trauma    Right knee pain, edema COMPARISON:  None VIEWS:  XR KNEE 4+ VW RIGHT INJURY FINDINGS: There is no acute fracture or dislocation  There is a small joint effusion  Mild right knee osteoarthritis, mild narrowing of the medial joint compartment No lytic or blastic osseous lesion  Chondrocalcinosis is present  There is suspicion of a loose body along the posterior joint line measuring 10 mm     Impression: Right knee arthritis, small effusion, chondrocalcinosis and suspected loose body No acute osseous abnormality The examination demonstrates a significant  finding and was documented as such in [facility] for liaison and referring practitioner notification   Workstation performed: [id number]     Imaging Reports Reviewed by myself    Cultures:   Blood Culture:   Lab Results   Component Value Date    BLOODCX No Growth at 48 hrs  05/06/2021    BLOODCX No Growth at 48 hrs  05/06/2021     Urine Culture: No results found for: URINECX  Sputum Culture: No components found for: SPUTUMCX  Wound Culture: No results found for: WOUNDCULT    Last 24 Hours Medication List:   Current Facility-Administered Medications   Medication Dose Route Frequency Provider Last Rate    acetaminophen  650 mg Oral Q6H PRN Charleen Castellano PA-C      amLODIPine  10 mg Oral Daily Charleen Castellano PA-C      atorvastatin  20 mg Oral Daily Charleen Castellano PA-C      enoxaparin  40 mg Subcutaneous Daily Charleen Castellano PA-C      insulin glargine  25 Units Subcutaneous QAM Governor Gear, CRNP      insulin lispro  1-5 Units Subcutaneous HS Governor Gear, CRNP      insulin lispro  1-6 Units Subcutaneous TID AC Governor Gear, CRNP      lisinopril  20 mg Oral Daily Charleen Castellano PA-C      metoprolol tartrate  50 mg Oral Daily Charleen Castellano PA-C      naloxone  0 04 mg Intravenous Q1MIN PRN Charleen Castellano PA-C      nicotine  1 patch Transdermal Daily Charleen Castellano PA-C      ondansetron  4 mg Intravenous Q6H PRN Charleen Castellano PA-C      oxyCODONE  10 mg Oral Q4H PRN Charleen Castellano PA-C      oxyCODONE-acetaminophen  1 tablet Oral Q4H PRN Yasmany Lilly PA-C      vancomycin  20 mg/kg Intravenous Q12H Yasmany Lilly PA-C 1,750 mg (05/09/21 0827)        Today, Patient Was Seen By: Sherrell Mac DO    ** Please Note: "This note has been constructed using a voice recognition system  Therefore there may be syntax, spelling, and/or grammatical errors   Please call if you have any questions  "**

## 2021-05-09 NOTE — PLAN OF CARE
Problem: PAIN - ADULT  Goal: Verbalizes/displays adequate comfort level or baseline comfort level  Description: Interventions:  - Encourage patient to monitor pain and request assistance  - Assess pain using appropriate pain scale  - Administer analgesics based on type and severity of pain and evaluate response  - Implement non-pharmacological measures as appropriate and evaluate response  - Consider cultural and social influences on pain and pain management  - Notify physician/advanced practitioner if interventions unsuccessful or patient reports new pain  Outcome: Progressing     Problem: INFECTION - ADULT  Goal: Absence or prevention of progression during hospitalization  Description: INTERVENTIONS:  - Assess and monitor for signs and symptoms of infection  - Monitor lab/diagnostic results  - Monitor all insertion sites, i e  indwelling lines, tubes, and drains  - Monitor endotracheal if appropriate and nasal secretions for changes in amount and color  - [place] appropriate cooling/warming therapies per order  - Administer medications as ordered  - Instruct and encourage patient and family to use good hand hygiene technique  - Identify and instruct in appropriate isolation precautions for identified infection/condition  Outcome: Progressing     Problem: SAFETY ADULT  Goal: Patient will remain free of falls  Description: INTERVENTIONS:  - Assess patient frequently for physical needs  -  Identify cognitive and physical deficits and behaviors that affect risk of falls    -  [place] fall precautions as indicated by assessment   - Educate patient/family on patient safety including physical limitations  - Instruct patient to call for assistance with activity based on assessment  - Modify environment to reduce risk of injury  - Consider OT/PT consult to assist with strengthening/mobility  Outcome: Progressing  Goal: Maintain or return to baseline ADL function  Description: INTERVENTIONS:  -  Assess patient's ability to carry out ADLs; assess patient's baseline for ADL function and identify physical deficits which impact ability to perform ADLs (bathing, care of mouth/teeth, toileting, grooming, dressing, etc )  - Assess/evaluate cause of self-care deficits   - Assess range of motion  - Assess patient's mobility; develop plan if impaired  - Assess patient's need for assistive devices and provide as appropriate  - Encourage maximum independence but intervene and supervise when necessary  - Involve family in performance of ADLs  - Assess for home care needs following discharge   - Consider OT consult to assist with ADL evaluation and planning for discharge  - Provide patient education as appropriate  Outcome: Progressing  Goal: Maintain or return mobility status to optimal level  Description: INTERVENTIONS:  - Assess patient's baseline mobility status (ambulation, transfers, stairs, etc )    - Identify cognitive and physical deficits and behaviors that affect mobility  - Identify mobility aids required to assist with transfers and/or ambulation (gait belt, sit-to-stand, lift, walker, cane, etc )  - [place] fall precautions as indicated by assessment  - Record patient progress and toleration of activity level on Mobility SBAR; progress patient to next Phase/Stage  - Instruct patient to call for assistance with activity based on assessment  - Consider rehabilitation consult to assist with strengthening/weightbearing, etc   Outcome: Progressing     Problem: DISCHARGE PLANNING  Goal: Discharge to home or other facility with appropriate resources  Description: INTERVENTIONS:  - Identify barriers to discharge w/patient and caregiver  - Arrange for needed discharge resources and transportation as appropriate  - Identify discharge learning needs (meds, wound care, etc )  - Arrange for interpretive services to assist at discharge as needed  - Refer to Case Management Department for coordinating discharge planning if the patient needs post-hospital services based on physician/advanced practitioner order or complex needs related to functional status, cognitive ability, or social support system  Outcome: Progressing

## 2021-05-09 NOTE — INCIDENTAL FINDINGS
The following findings require follow up:  Radiographic finding   Finding: right lung opacity   Follow up required: CT chest    Please notify the following clinician to assist with the follow up:   Your primary care doctor

## 2021-05-09 NOTE — PROGRESS NOTES
Vancomycin Assessment    Kenan Valle is a 59 y o  male who is currently receiving vancomycin Vancomycin 1750 mg IV q12h for other Septic Arthritis   Relevant clinical data and objective history reviewed:  Creatinine   Date Value Ref Range Status   05/09/2021 0 86 0 60 - 1 30 mg/dL Final     Comment:     Standardized to IDMS reference method   05/08/2021 0 77 0 60 - 1 30 mg/dL Final     Comment:     Standardized to IDMS reference method   05/07/2021 0 73 0 60 - 1 30 mg/dL Final     Comment:     Standardized to IDMS reference method     /80 (BP Location: Right arm)   Pulse 85   Temp 98 4 °F (36 9 °C) (Tympanic)   Resp 20   Ht 5' 9" (1 753 m)   Wt 88 5 kg (195 lb)   SpO2 93%   BMI 28 80 kg/m²   I/O last 3 completed shifts: In: 2956 3 [I V :456 3; IV Piggyback:2500]  Out: 2000 [Urine:2000]  Lab Results   Component Value Date/Time    BUN 11 05/09/2021 05:31 AM    WBC 10 84 (H) 05/09/2021 05:31 AM    HGB 13 6 05/09/2021 05:31 AM    HCT 41 4 05/09/2021 05:31 AM    MCV 84 05/09/2021 05:31 AM     05/09/2021 05:31 AM     Temp Readings from Last 3 Encounters:   05/08/21 98 4 °F (36 9 °C) (Tympanic)   05/05/21 99 2 °F (37 3 °C)   01/13/21 (!) 97 1 °F (36 2 °C)     Vancomycin Days of Therapy: 5    Assessment/Plan  The patient is currently on vancomycin utilizing scheduled dosing  Baseline risks associated with therapy include: concomitant nephrotoxic medications, advanced age, and dehydration  The patient is receiving Vancomycin 1750 mg IV q12h with the most recent vancomycin level being at steady-state and therapeutic (15 8) based on a goal of 15-20 (appropriate for most indications) ; therefore, is clinically appropriate and dose will be continued   Pharmacy will continue to follow closely for s/sx of nephrotoxicity, infusion reactions, and appropriateness of therapy  BMP and CBC will be ordered per protocol    Plan for trough as patient approaches steady state, prior to the 4th  dose at approximately 31 75 62 on 5/10/21  Pharmacy will continue to follow the patients culture results and clinical progress daily      Sven Mack, Pharmacist

## 2021-05-09 NOTE — PROGRESS NOTES
Orthopedics   Dorthea Quivers 59 y o  male MRN: [id number]  Unit/Bed#: [address] [address]      Subjective:  59 y o male post operative day 4 right knee arthroscopic I and D for septic joint  Pt doing well  Pain controlled  Has been participating in PT  Denies fever chills      Labs:  0   Lab Value Date/Time    HCT 41 4 05/09/2021 0531    HCT 39 6 05/08/2021 0520    HCT 39 7 05/07/2021 0503    HGB 13 6 05/09/2021 0531    HGB 13 2 05/08/2021 0520    HGB 13 0 05/07/2021 0503    WBC 10 84 (H) 05/09/2021 0531    WBC 11 92 (H) 05/08/2021 0520    WBC 14 51 (H) 05/07/2021 0503       Meds:    Current Facility-Administered Medications:     acetaminophen (TYLENOL) tablet 650 mg, 650 mg, Oral, Q6H PRN, Dmitry Shingles, PA-C, 650 mg at 05/06/21 1901    amLODIPine (NORVASC) tablet 10 mg, 10 mg, Oral, Daily, Dmitry Shingles, PA-C, 10 mg at 05/09/21 2376    atorvastatin (LIPITOR) tablet 20 mg, 20 mg, Oral, Daily, Dmitry Shingles, PA-C, 20 mg at 05/09/21 0489    enoxaparin (LOVENOX) subcutaneous injection 40 mg, 40 mg, Subcutaneous, Daily, Dmitry Shingles, PA-C, 40 mg at 05/09/21 0827    insulin glargine (LANTUS) subcutaneous injection 25 Units 0 25 mL, 25 Units, Subcutaneous, QAM, Nina Tyrell, CRNP, 25 Units at 05/09/21 0827    insulin lispro (HumaLOG) 100 units/mL subcutaneous injection 1-5 Units, 1-5 Units, Subcutaneous, HS, Nina Tyrell, CRNP    insulin lispro (HumaLOG) 100 units/mL subcutaneous injection 1-6 Units, 1-6 Units, Subcutaneous, TID AC **AND** Fingerstick Glucose (POCT), , , TID AC, Nina Tyrell, CRNP    lisinopril (ZESTRIL) tablet 20 mg, 20 mg, Oral, Daily, Dmitry Shingles, PA-C, 20 mg at 05/09/21 0522    metoprolol tartrate (LOPRESSOR) tablet 50 mg, 50 mg, Oral, Daily, Dmitry Shingles, PA-C, 50 mg at 05/09/21 0828    naloxone (NARCAN) 0 04 mg/mL syringe 0 04 mg, 0 04 mg, Intravenous, Q1MIN PRN, Dmitry Shingles, PA-C    nicotine (NICODERM CQ) 21 mg/24 hr TD 24 hr patch 1 patch, 1 patch, Transdermal, Daily, Tati Olvera PA-C, Stopped at 05/09/21 0834    ondansetron (ZOFRAN) injection 4 mg, 4 mg, Intravenous, Q6H PRN, Tati Olvera PA-C    oxyCODONE (ROXICODONE) IR tablet 10 mg, 10 mg, Oral, Q4H PRN, INEZ Caballero-C, 10 mg at 05/07/21 2252    oxyCODONE-acetaminophen (PERCOCET) 5-325 mg per tablet 1 tablet, 1 tablet, Oral, Q4H PRN, Tati Olvera PA-C, 1 tablet at 05/07/21 0828    vancomycin (VANCOCIN) 1,750 mg in sodium chloride 0 9 % 500 mL IVPB, 20 mg/kg, Intravenous, Q12H, Tati Olvera PA-C, Last Rate: 250 mL/hr at 05/09/21 0827, 1,750 mg at 05/09/21 0827    Blood Culture:   Lab Results   Component Value Date    BLOODCX No Growth at 48 hrs  05/06/2021       Wound Culture:   No results found for: WOUNDCULT    Ins and Outs:  I/O last 24 hours: In: 2500 [IV Piggyback:2500]  Out: 1350 [Urine:1350]          Physical Exam:  Vitals:    05/09/21 0823   BP: 134/79   Pulse: 94   Resp: 20   Temp: 98 5 °F (36 9 °C)   SpO2: 92%     right lower extremity  Dressing cdi  Mild swelling noted about the knee  Incisions healing well  Compartment soft  Brisk cap refill  Range of motion 0 to approximately 90        Assessment: 61 y o male post operative day 3 right knee arthroscopic washout for septic joint  Cultures pending     Plan:  Discussed the plan with medicine ID  Patient is stable for DC today  Antibiotics are ordered for home  Instructions on the chart  Patient will follow-up with Dr Valery Siu this week    He will maintain his dressing      Dc order placed    Glenn [place] DO

## 2021-05-09 NOTE — DISCHARGE INSTRUCTIONS
INSTRUCTIONS FOLLOWING YOUR KNEE ARTHROSCOPY    FIRST FOLLOW-UP VISIT AFTER SURGERY      Follow-up Early next week in the office for follow-up  If this is not scheduled prior to discharge please call the office to schedule this appointment  CANE OR CRUTCHES      You may put as much weight on your leg as tolerated when using the crutches/cane  When you feel that the crutches/cane is not necessary, you may discontinue its use  Use common sense, let pain be your guide for your activities  Climbing stairs, walking and sitting are all permitted as you tolerate them  EXERCISE PROGRAM      At your 1st follow-up visit you will be given a prescription for physical therapy if you have not already been given one  SHOWERING      Keep your bandage in place  INCISION SITE      You may notice a small amount of blood on your bandage, about the size of a quarter, this is normal and there is no need to worry  If you notice uncontrollable or excessive bleeding, oozing, or redness of the wound please call the office  SWELLING AND DISCOMFORT      You will experience some pain and discomfort after surgery  You will be given a prescription for pain medication  Take the medication as prescribed  If the discomfort is mild, you can take 1 or 2 Tylenol, every 4-6 hours as needed, instead of the prescribed pain medication  You will notice some swelling of your knee after surgery, this is normal      To help reduce the swelling, elevate your leg as much as possible the first two days  In addition, you may place ice on your knee to reduce swelling  Place a plastic bag filled with ice, wrapped in a thin towel, on your knee  Do not keep ice on for more than 15-20 minutes, repeat 4-5 TIMES A DAY, IF POSSIBLE  TEMPERATURE      A temperature of less than 101 degrees is common for the first 1-2 days after surgery  If your temperature is elevated above 101 degrees, call the office        IF YOU HAVE ANY OTHER CONCERNS OR QUESTIONS AT ANY TIME, DO NOT HESITATE TO CALL THE OFFICE [phone]  Follow-up regarding your chest x-ray findings as discussed  You may need a CT scan of the chest as was previously planned for further evaluation    This can be set up by your primary care doctor

## 2021-05-09 NOTE — PLAN OF CARE
Problem: PAIN - ADULT  Goal: Verbalizes/displays adequate comfort level or baseline comfort level  Description: Interventions:  - Encourage patient to monitor pain and request assistance  - Assess pain using appropriate pain scale  - Administer analgesics based on type and severity of pain and evaluate response  - Implement non-pharmacological measures as appropriate and evaluate response  - Consider cultural and social influences on pain and pain management  - Notify physician/advanced practitioner if interventions unsuccessful or patient reports new pain  Outcome: Progressing     Problem: INFECTION - ADULT  Goal: Absence or prevention of progression during hospitalization  Description: INTERVENTIONS:  - Assess and monitor for signs and symptoms of infection  - Monitor lab/diagnostic results  - Monitor all insertion sites, i e  indwelling lines, tubes, and drains  - Monitor endotracheal if appropriate and nasal secretions for changes in amount and color  - [place] appropriate cooling/warming therapies per order  - Administer medications as ordered  - Instruct and encourage patient and family to use good hand hygiene technique  - Identify and instruct in appropriate isolation precautions for identified infection/condition  Outcome: Progressing     Problem: SAFETY ADULT  Goal: Patient will remain free of falls  Description: INTERVENTIONS:  - Assess patient frequently for physical needs  -  Identify cognitive and physical deficits and behaviors that affect risk of falls    -  [place] fall precautions as indicated by assessment   - Educate patient/family on patient safety including physical limitations  - Instruct patient to call for assistance with activity based on assessment  - Modify environment to reduce risk of injury  - Consider OT/PT consult to assist with strengthening/mobility  Outcome: Progressing  Goal: Maintain or return to baseline ADL function  Description: INTERVENTIONS:  -  Assess patient's ability to carry out ADLs; assess patient's baseline for ADL function and identify physical deficits which impact ability to perform ADLs (bathing, care of mouth/teeth, toileting, grooming, dressing, etc )  - Assess/evaluate cause of self-care deficits   - Assess range of motion  - Assess patient's mobility; develop plan if impaired  - Assess patient's need for assistive devices and provide as appropriate  - Encourage maximum independence but intervene and supervise when necessary  - Involve family in performance of ADLs  - Assess for home care needs following discharge   - Consider OT consult to assist with ADL evaluation and planning for discharge  - Provide patient education as appropriate  Outcome: Progressing  Goal: Maintain or return mobility status to optimal level  Description: INTERVENTIONS:  - Assess patient's baseline mobility status (ambulation, transfers, stairs, etc )    - Identify cognitive and physical deficits and behaviors that affect mobility  - Identify mobility aids required to assist with transfers and/or ambulation (gait belt, sit-to-stand, lift, walker, cane, etc )  - [place] fall precautions as indicated by assessment  - Record patient progress and toleration of activity level on Mobility SBAR; progress patient to next Phase/Stage  - Instruct patient to call for assistance with activity based on assessment  - Consider rehabilitation consult to assist with strengthening/weightbearing, etc   Outcome: Progressing     Problem: DISCHARGE PLANNING  Goal: Discharge to home or other facility with appropriate resources  Description: INTERVENTIONS:  - Identify barriers to discharge w/patient and caregiver  - Arrange for needed discharge resources and transportation as appropriate  - Identify discharge learning needs (meds, wound care, etc )  - Arrange for interpretive services to assist at discharge as needed  - Refer to Case Management Department for coordinating discharge planning if the patient needs post-hospital services based on physician/advanced practitioner order or complex needs related to functional status, cognitive ability, or social support system  Outcome: Progressing     Problem: Knowledge Deficit  Goal: Patient/family/caregiver demonstrates understanding of disease process, treatment plan, medications, and discharge instructions  Description: Complete learning assessment and assess knowledge base  Interventions:  - Provide teaching at level of understanding  - Provide teaching via preferred learning methods  Outcome: Progressing     Problem: Potential for Falls  Goal: Patient will remain free of falls  Description: INTERVENTIONS:  - Assess patient frequently for physical needs  -  Identify cognitive and physical deficits and behaviors that affect risk of falls    -  [place] fall precautions as indicated by assessment   - Educate patient/family on patient safety including physical limitations  - Instruct patient to call for assistance with activity based on assessment  - Modify environment to reduce risk of injury  - Consider OT/PT consult to assist with strengthening/mobility  Outcome: Progressing

## 2021-05-10 ENCOUNTER — TRANSITIONAL CARE MANAGEMENT (OUTPATIENT)
Dept: FAMILY MEDICINE CLINIC | Facility: CLINIC | Age: 61
End: 2021-05-10

## 2021-05-10 ENCOUNTER — TELEPHONE (OUTPATIENT)
Dept: OBGYN CLINIC | Facility: HOSPITAL | Age: 61
End: 2021-05-10

## 2021-05-10 NOTE — TELEPHONE ENCOUNTER
Patient sees Cheyenne Arambula    Patient has several questions, he states that he didn't receive much information during his discharge  He would like to know about driving, going back to work & several others      CB - Cell # - [id number] [address] [facility] # [phone]

## 2021-05-11 ENCOUNTER — APPOINTMENT (OUTPATIENT)
Dept: LAB | Facility: HOSPITAL | Age: 61
End: 2021-05-11
Attending: ORTHOPAEDIC SURGERY
Payer: COMMERCIAL

## 2021-05-11 DIAGNOSIS — M00.9 PYOGENIC ARTHRITIS OF RIGHT KNEE JOINT, DUE TO UNSPECIFIED ORGANISM (HCC): ICD-10-CM

## 2021-05-11 LAB
ANION GAP SERPL CALCULATED.3IONS-SCNC: 9 MMOL/L (ref 4–13)
B HENSELAE IGG TITR SER IF: NEGATIVE TITER
B HENSELAE IGM TITR SER IF: NEGATIVE TITER
B QUINTANA IGG TITR SER IF: NEGATIVE TITER
B QUINTANA IGM TITR SER IF: NEGATIVE TITER
BACTERIA BLD CULT: NORMAL
BACTERIA BLD CULT: NORMAL
BASOPHILS # BLD AUTO: 0.09 THOUSANDS/ΜL (ref 0–0.1)
BASOPHILS NFR BLD AUTO: 1 % (ref 0–1)
BUN SERPL-MCNC: 13 MG/DL (ref 6–20)
CALCIUM SERPL-MCNC: 9.6 MG/DL (ref 8.4–10.2)
CHLORIDE SERPL-SCNC: 102 MMOL/L (ref 96–108)
CO2 SERPL-SCNC: 29 MMOL/L (ref 22–33)
CREAT SERPL-MCNC: 1.02 MG/DL (ref 0.5–1.2)
EOSINOPHIL # BLD AUTO: 0.71 THOUSAND/ΜL (ref 0–0.61)
EOSINOPHIL NFR BLD AUTO: 6 % (ref 0–6)
ERYTHROCYTE [DISTWIDTH] IN BLOOD BY AUTOMATED COUNT: 16.2 % (ref 11.6–15.1)
GFR SERPL CREATININE-BSD FRML MDRD: 78 ML/MIN/1.73SQ M
GLUCOSE P FAST SERPL-MCNC: 127 MG/DL (ref 70–105)
HCT VFR BLD AUTO: 44 % (ref 36.5–49.3)
HGB BLD-MCNC: 14.6 G/DL (ref 12–17)
IMM GRANULOCYTES # BLD AUTO: 0.1 THOUSAND/UL (ref 0–0.2)
IMM GRANULOCYTES NFR BLD AUTO: 1 % (ref 0–2)
LYMPHOCYTES # BLD AUTO: 2.67 THOUSANDS/ΜL (ref 0.6–4.47)
LYMPHOCYTES NFR BLD AUTO: 24 % (ref 14–44)
MCH RBC QN AUTO: 27 PG (ref 26.8–34.3)
MCHC RBC AUTO-ENTMCNC: 33.2 G/DL (ref 31.4–37.4)
MCV RBC AUTO: 81 FL (ref 82–98)
MONOCYTES # BLD AUTO: 1.69 THOUSAND/ΜL (ref 0.17–1.22)
MONOCYTES NFR BLD AUTO: 15 % (ref 4–12)
NEUTROPHILS # BLD AUTO: 6.11 THOUSANDS/ΜL (ref 1.85–7.62)
NEUTS SEG NFR BLD AUTO: 53 % (ref 43–75)
PLATELET # BLD AUTO: 374 THOUSANDS/UL (ref 149–390)
PMV BLD AUTO: 9.7 FL (ref 8.9–12.7)
POTASSIUM SERPL-SCNC: 3.9 MMOL/L (ref 3.5–5)
RBC # BLD AUTO: 5.41 MILLION/UL (ref 3.88–5.62)
SODIUM SERPL-SCNC: 140 MMOL/L (ref 133–145)
WBC # BLD AUTO: 11.37 THOUSAND/UL (ref 4.31–10.16)

## 2021-05-11 PROCEDURE — 36415 COLL VENOUS BLD VENIPUNCTURE: CPT

## 2021-05-11 PROCEDURE — 85025 COMPLETE CBC W/AUTO DIFF WBC: CPT

## 2021-05-11 PROCEDURE — 80048 BASIC METABOLIC PNL TOTAL CA: CPT

## 2021-05-11 NOTE — UTILIZATION REVIEW
Notification of Discharge   This is a Notification of Discharge from our facility [address]  Please be advised that this patient has been discharge from our facility  Below you will find the admission and discharge date and time including the patients disposition  UTILIZATION REVIEW CONTACT:  Anaid Urrutia  Utilization   Network Utilization Review Department  Phone: [phone] x carefully listen to the prompts  All voicemails are confidential   Email: [email or web address]     PHYSICIAN ADVISORY SERVICES:  FOR NPOY-RU-BWBW REVIEW - MEDICAL NECESSITY DENIAL  Phone: [phone]  Fax: [phone]  Email: [email or web address]     PRESENTATION DATE: 5/5/2021  4:52 PM  OBERVATION ADMISSION DATE:   INPATIENT ADMISSION DATE: 5/5/21 1652   DISCHARGE DATE: 5/9/2021  4:27 PM  DISPOSITION: Home/Self Care Home/Self Care      IMPORTANT INFORMATION:  Send all requests for admission clinical reviews, approved or denied determinations and any other requests to dedicated fax number below belonging to the campus where the patient is receiving treatment   List of dedicated fax numbers:  [address] DENIALS (Administrative/Medical Necessity) [phone]   [address] (Maternity/NICU/Pediatrics) [phone]   Esperanza Merino [phone]   Diana Fuentes [phone]   Cullen Opitz [phone]   [place] [address] [phone]   [facility]  [phone]   [address] [facility]  [address] [facility] [address] [facility] [phone]

## 2021-05-12 ENCOUNTER — OFFICE VISIT (OUTPATIENT)
Dept: OBGYN CLINIC | Facility: CLINIC | Age: 61
End: 2021-05-12

## 2021-05-12 VITALS
WEIGHT: 188 LBS | BODY MASS INDEX: 27.85 KG/M2 | SYSTOLIC BLOOD PRESSURE: 115 MMHG | HEART RATE: 102 BPM | DIASTOLIC BLOOD PRESSURE: 72 MMHG | HEIGHT: 69 IN

## 2021-05-12 DIAGNOSIS — M00.9 PYOGENIC ARTHRITIS OF RIGHT KNEE JOINT, DUE TO UNSPECIFIED ORGANISM (HCC): Primary | ICD-10-CM

## 2021-05-12 PROCEDURE — 99024 POSTOP FOLLOW-UP VISIT: CPT | Performed by: ORTHOPAEDIC SURGERY

## 2021-05-12 NOTE — PROGRESS NOTES
Assessment/Plan:  1  Pyogenic arthritis of right knee joint, due to unspecified organism [facility])  Ambulatory referral to Infectious Disease     The patient is doing well from a knee perspective and has minimal pain at this point  His rash appears to be a contact dermatitis from the chlorhexidine prep used pre-op  We discussed that it is quite unusual that there has been no culture growth, despite rancho purulence noted at the time of arthroscopy  We do advise he F/U with infectious disease, and did provide him with this referral today  It does appear he has a cellulitis of the left forearm where his IV had been  IT is our hope this will improve with his antibiotics  If this gets worse we advised FU in the office  There is no obvious abscess present today  He may continue to ambulate as tolerated and may return to sedentary duty at work, with return to full duty in 2 more weeks  He will follow-up as needed for this  Subjective:   Joselyn Ochoa is a 59 y o  male who presents today for follow-up of his right knee, now about 7 days status post I&D septic right knee  Despite rancho pus being present at the time of surgery, there has been no growth on cultures  He notes that the knee is feeling much better  He notes minimal pain about he knee at this point  He notes full ROM of the knee and is ambulating well without difficulty  He does note a rash from the ankle to the thigh which he notes is itchy  He notes good sensation of the right lower extremity  He denies any fever/chills  He did have bloodwork yesterday  His WBC was 11 37, up from 10 8 on 5/9/21  The patient did inform us today that he had a partial splenectomy at the age of 6 after a sledding accident         Review of Systems      Past Medical History:   Diagnosis Date    Diabetes mellitus ([address] )     Hyperlipidemia     Hypertension     Squamous cell carcinoma of leg, right        Past Surgical History:   Procedure Laterality Date    CHOLECYSTECTOMY  HAND TENDON SURGERY      SPLENECTOMY, PARTIAL  1968    accident    TONSILLECTOMY      WOUND DEBRIDEMENT Right 5/5/2021    Procedure: RIGHT KNEE ARTHROSCOPY W/IRRIGATION AND DEBRIDEMENT OF SEPTIC ARTHRITIS;  Surgeon: Kerrie Carrington MD;  Location: [facility];  Service: Orthopedics       Family History   Problem Relation Age of Onset    Ovarian cancer Mother     Cancer Mother     Kidney disease Mother     Coronary artery disease Father     Diabetes Father     Cancer Brother        Social History     Occupational History    Not on file   Tobacco Use    Smoking status: Current Every Day Smoker     Packs/day: 1 00     Years: 45 00     Pack years: 45 00     Types: Cigarettes    Smokeless tobacco: Never Used   Substance and Sexual Activity    Alcohol use: Yes     Frequency: 2-4 times a month     Drinks per session: 1 or 2    Drug use: Never    Sexual activity: Yes     Partners: Female         Current Outpatient Medications:     amLODIPine (NORVASC) 10 mg tablet, Take 1 tablet (10 mg total) by mouth daily, Disp: 90 tablet, Rfl: 1    atorvastatin (LIPITOR) 20 mg tablet, Take 1 tablet (20 mg total) by mouth daily, Disp: 90 tablet, Rfl: 4    azithromycin (ZITHROMAX) 500 MG tablet, Take 1 tablet (500 mg total) by mouth every 24 hours for 11 days, Disp: 11 tablet, Rfl: 0    insulin glargine (Lantus SoloStar) 100 units/mL injection pen, Inject 25 Units under the skin daily , Disp: , Rfl:     Jardiance 25 MG TABS, Take 1 tablet (25 mg total) by mouth daily, Disp: 90 tablet, Rfl: 1    linezolid (ZYVOX) 600 mg tablet, Take 1 tablet (600 mg total) by mouth every 12 (twelve) hours for 11 days, Disp: 22 tablet, Rfl: 0    lisinopril (ZESTRIL) 40 mg tablet, daily Taking 1/2 tablet daily (20mg), Disp: , Rfl:     metFORMIN (GLUCOPHAGE) 1000 MG tablet, Take 1 tablet (1,000 mg total) by mouth 2 (two) times a day, Disp: 180 tablet, Rfl: 1    metoprolol tartrate (LOPRESSOR) 50 mg tablet, Take 1 tablet (50 mg total) by mouth daily, Disp: 90 tablet, Rfl: 3    Allergies   Allergen Reactions    Amoxicillin Rash    Amoxicillin-Pot Clavulanate Rash       Objective:  Vitals:    05/12/21 0925   BP: 115/72   Pulse: 102       Right Knee Exam     Tenderness   The patient is experiencing no tenderness  Range of Motion   Extension: 0   Flexion: 130     Other   Erythema: absent  Sensation: normal  Pulse: present  Effusion: effusion (trace) present    Comments:  Diffuse erythematous rash ankle to thigh, in region of chlorhexidine prep  Observations     Right Knee   Positive for effusion (trace)  Physical Exam  Musculoskeletal:      Right knee: He exhibits effusion (trace)  Left forearm: Erythematous, tender region radial distal forearm in region of prior IV site  NO abscess appreciated

## 2021-05-12 NOTE — PROGRESS NOTES
Transition of Care Management Visit    Date of Service: 21  Primary Care Provider:   Niesha Villarreal MD       Name: Yahaira Arzate       : 1960       Age:61 y o  Sex: male      MRN: [id number]      Assessment/Plan: Yahaira Arzate is a 59 y o  male with:   Problem List Items Addressed This Visit        Endocrine    Type 2 diabetes mellitus, with long-term current use of insulin ([facility] [address] )     Lab Results   Component Value Date    HGBA1C 6 2 (H) 04/10/2021     Lab Results   Component Value Date    GLUF 127 (H) 2021    LDLCALC 33 04/10/2021    CREATININE 1 02 2021     Well controlled, continue current management with Lantus 25U, Jardiance 25 mg and metformin 1000 mg twice daily            Musculoskeletal and Integument    Septic arthritis of knee, right (HCC)     Unclear source  Status post I&D, now on antibiotics with Linezolid and azithromycin to complete additional 11 days (EOT )  Referral placed to infectious disease by ortho  Discussed importance of completing course of antibiotics  Other    History of partial splenectomy     He has history of splenectomy as a child after a skiing accident  He did have abdominal US in 2020 that showed a spleen was present measuring 8 9 x 7 8 x 2 4 cm  He has had pneumococcal and Meningococcal vaccines documented, will need to verify vaccine history  May need Hib, repeat MenB, Men ACWY  Other Visit Diagnoses     Keratoacanthoma of pinna    -  Primary    Relevant Orders    Tissue Exam (Completed)    Biopsy    Verrucous keratosis            Biopsy    Date/Time: 2021 1:32 PM  Performed by: Niesha Villarreal MD  Authorized by: Niesha Villarreal MD   Universal Protocol:  Consent: Verbal consent obtained    Risks and benefits: risks, benefits and alternatives were discussed  Time out: Immediately prior to procedure a "time out" was called to verify the correct patient, procedure, equipment, support staff and site/side marked as required  Patient understanding: patient states understanding of the procedure being performed  Patient consent: the patient's understanding of the procedure matches consent given  Procedure consent: procedure consent matches procedure scheduled  Relevant documents: relevant documents present and verified  Test results: test results not available  Site marked: the operative site was marked  Radiology Images displayed and confirmed  If images not available, report reviewed: imaging studies not available  Required items: required blood products, implants, devices, and special equipment available  Patient identity confirmed: verbally with patient      Procedure Details - Skin Biopsy:     Biopsy tissue type: skin    Biopsy method: shave biopsy      Body area:  Head/neck    Head/neck location:  R ear    Initial size (mm):  2    Final defect size (mm):  0    Malignancy: benign lesion       Patient tolerated procedure without complications       Subjective:  Adonis Cleveland is a 59 y o  male here for Transition Care Management Visit  [facility]    Hospital Summary  Patient was admitted May 5-9 with septic knee, pain initially started after he mis-stepped at work though progressively worsened tohte point where he would not bear weight  He underwent bedside arthrocentesis with gram stain revealing 89k WBC  He underwent arthroscopic I&D with ortho  Cultures were no growth  He was treated with IV vancomycin during hospitalization  He was discharged on Linezolid and azithromycin for an addition of 11 days (EOT May 20)  Cultures still remain no growth  Right lung opacity was noted on chest Xray during hospitalization, he will require follow-up  Felt to be due to history of GPA granulomatosis  Today he is feeling well, he denies fevers, chills  His knee pain is well controlled, he is able to bear weight without difficulty He has an area of erythema, tenderness and fluctuance on left forearm from an IV site   He reports his appetite has been good  His blood sugars has been well controlled  He would like lesion on right ear removed  It bothers him when putting hats on       Patient Active Problem List    Diagnosis Date Noted    AZAR (obstructive sleep apnea) 05/08/2021    Hyperlipidemia 05/05/2021    Smoking 05/05/2021    [facility] right [facility] 05/05/2021     Class: Acute    Multiple pulmonary nodules 01/13/2021    Granulomatosis with polyangiitis ([facility] ) 01/13/2021    Type 2 diabetes mellitus, with long-term current use of insulin ([facility] ) 01/13/2021    Essential hypertension 01/13/2021    Overweight with body mass index (BMI) of 29 to 29 9 in adult 01/13/2021    Tobacco use disorder, continuous 01/13/2021    History of partial splenectomy 01/13/2021    Recurrent boils 01/13/2021       Current Outpatient Medications:     amLODIPine (NORVASC) 10 mg tablet, Take 1 tablet (10 mg total) by mouth daily, Disp: 90 tablet, Rfl: 1    atorvastatin (LIPITOR) 20 mg tablet, Take 1 tablet (20 mg total) by mouth daily, Disp: 90 tablet, Rfl: 4    azithromycin (ZITHROMAX) 500 MG tablet, Take 1 tablet (500 mg total) by mouth every 24 hours for 11 days, Disp: 11 tablet, Rfl: 0    insulin glargine (Lantus SoloStar) 100 units/mL injection pen, Inject 25 Units under the skin daily , Disp: , Rfl:     Jardiance 25 MG TABS, Take 1 tablet (25 mg total) by mouth daily, Disp: 90 tablet, Rfl: 1    linezolid (ZYVOX) 600 mg tablet, Take 1 tablet (600 mg total) by mouth every 12 (twelve) hours for 11 days, Disp: 22 tablet, Rfl: 0    lisinopril (ZESTRIL) 40 mg tablet, daily Taking 1/2 tablet daily (20mg), Disp: , Rfl:     metFORMIN (GLUCOPHAGE) 1000 MG tablet, Take 1 tablet (1,000 mg total) by mouth 2 (two) times a day, Disp: 180 tablet, Rfl: 1    metoprolol tartrate (LOPRESSOR) 50 mg tablet, Take 1 tablet (50 mg total) by mouth daily, Disp: 90 tablet, Rfl: 3    Patient's medications at hospital discharge were reviewed and reconciled with current medication list      Review of Systems   Constitutional: Negative for activity change, appetite change, fatigue, fever and unexpected weight change  Respiratory: Negative for shortness of breath  Cardiovascular: Negative for chest pain and leg swelling  Gastrointestinal: Negative for abdominal distention and abdominal pain  Musculoskeletal: Positive for arthralgias (knee pain)  Skin: Positive for color change and rash  Objective:  /78   Pulse 97   Temp 98 °F (36 7 °C)   Resp 16   Ht 5' 9" (1 753 m)   Wt 84 8 kg (187 lb)   SpO2 99%   BMI 27 62 kg/m²   BP Readings from Last 3 Encounters:   05/13/21 124/78   05/12/21 115/72   05/09/21 134/79      Wt Readings from Last 3 Encounters:   05/13/21 84 8 kg (187 lb)   05/12/21 85 3 kg (188 lb)   05/05/21 88 5 kg (195 lb)      Physical Exam  Constitutional:       General: He is not in acute distress  Appearance: Normal appearance  He is normal weight  He is not ill-appearing or diaphoretic  HENT:      Head: Normocephalic and atraumatic  Right Ear: External ear normal       Left Ear: External ear normal       Ears:        Nose: Nose normal       Mouth/Throat:      Mouth: Mucous membranes are moist    Eyes:      Extraocular Movements: Extraocular movements intact  Conjunctiva/sclera: Conjunctivae normal    Neck:      Musculoskeletal: Normal range of motion and neck supple  Cardiovascular:      Pulses: Normal pulses  Pulmonary:      Effort: Pulmonary effort is normal  No respiratory distress  Abdominal:      General: There is no distension  Musculoskeletal: Normal range of motion  Right knee: He exhibits swelling  Tenderness (mild tenderness) found  Right lower leg: No edema  Left lower leg: No edema  Skin:     Findings: Petechiae (diffuse petechiae on lower extremities R >L) present  No erythema or rash  Neurological:      General: No focal deficit present        Mental Status: He is alert       Gait: Gait normal    Psychiatric:         Mood and Affect: Mood normal          Behavior: Behavior normal          Lab Results   Component Value Date    WBC 11 37 (H) 05/11/2021    HGB 14 6 05/11/2021    HCT 44 0 05/11/2021    MCV 81 (L) 05/11/2021     05/11/2021     Lab Results   Component Value Date    SODIUM 140 05/11/2021    K 3 9 05/11/2021     05/11/2021    CO2 29 05/11/2021    BUN 13 05/11/2021    CREATININE 1 02 05/11/2021    GLUC 118 05/09/2021    CALCIUM 9 6 05/11/2021     No results found for: CRP    Lab Results   Component Value Date    HGBA1C 6 2 (H) 04/10/2021       Imaging from hospitalization reviewed    Xray Right Knee   FINDINGS:  There is no acute fracture or dislocation  There is a small joint effusion  Mild right knee osteoarthritis, mild narrowing of the medial joint compartment  No lytic or blastic osseous lesion  Chondrocalcinosis is present  There is suspicion of a loose body along the posterior joint line measuring 10 mm    Chest Xray  Oval opacity in the right midlung most likely related to the patient's underlying Wegener's granulomatosis  Fissural fluid or nodule of other etiology not excluded      No focal consolidation or effusion  Transitional Care Management Review:  Huma Bright is a 59 y o  male here for TCM follow up       During the TCM phone call patient stated:  TCM Call (since 4/16/2021)     Date and time call was made  5/10/2021 10:49 AM    Hospital care reviewed  Records reviewed    Patient was hospitialized at  [address] [place] [address]        Date of Admission  05/05/21    Date of discharge  05/09/21    Diagnosis  Sepsis arthritis of right knee     Disposition  Home    Were the patients medications reviewed and updated  Yes    Current Symptoms  Leg pain - right side    Right side leg pain severity  Moderate    Leg pain, right side, onset  Ongoing      TCM Call (since 4/16/2021)     Post hospital issues  Reduced activity    Should patient be enrolled in anticoag monitoring? No    Scheduled for follow up? Yes    Did you obtain your prescribed medications  Yes    Do you need help managing your prescriptions or medications  No    Is transportation to your appointment needed  Yes    Specify why  PAtient not able to drive due to knee infection     I have advised the patient to call PCP with any new or worsening symptoms  Sarita Green MA     Living Arrangements  Spouse or Significiant other    Support System  Spouse    The type of support provided  Emotional; Physical    Do you have social support  Yes, as much as I need    Are you recieving any outpatient services  No    Are you recieving home care services  No    Are you using any community resources  No    Current waiver services  No    Have you fallen in the last 12 months  No    Interperter language line needed  No    Counseling  Patient          Teena Garcias MD    Note: Portions of the record may have been created with voice recognition software  Occasional wrong word or "sound a like" substitutions may have occurred due to the inherent limitations of voice recognition software  Read the chart carefully and recognize, using context, where substitutions have occurred

## 2021-05-12 NOTE — LETTER
May 12, 2021     Patient: Carol May   YOB: 1960   Date of Visit: 5/12/2021       To Whom it May Concern:    Carol May is under my professional care  He was seen in my office on 5/12/2021  He may return to work sedentary duty and is cleared for full duty on 5/31/21  If you have any questions or concerns, please don't hesitate to call           Sincerely,          James Sood MD        CC: No Recipients

## 2021-05-13 ENCOUNTER — OFFICE VISIT (OUTPATIENT)
Dept: FAMILY MEDICINE CLINIC | Facility: CLINIC | Age: 61
End: 2021-05-13
Payer: COMMERCIAL

## 2021-05-13 VITALS
DIASTOLIC BLOOD PRESSURE: 78 MMHG | RESPIRATION RATE: 16 BRPM | HEART RATE: 97 BPM | TEMPERATURE: 98 F | WEIGHT: 187 LBS | SYSTOLIC BLOOD PRESSURE: 124 MMHG | BODY MASS INDEX: 27.7 KG/M2 | HEIGHT: 69 IN | OXYGEN SATURATION: 99 %

## 2021-05-13 DIAGNOSIS — L82.1 VERRUCOUS KERATOSIS: ICD-10-CM

## 2021-05-13 DIAGNOSIS — Z79.4 TYPE 2 DIABETES MELLITUS WITHOUT COMPLICATION, WITH LONG-TERM CURRENT USE OF INSULIN (HCC): ICD-10-CM

## 2021-05-13 DIAGNOSIS — M00.9 PYOGENIC ARTHRITIS OF RIGHT KNEE JOINT, DUE TO UNSPECIFIED ORGANISM (HCC): ICD-10-CM

## 2021-05-13 DIAGNOSIS — E11.9 TYPE 2 DIABETES MELLITUS WITHOUT COMPLICATION, WITH LONG-TERM CURRENT USE OF INSULIN (HCC): ICD-10-CM

## 2021-05-13 DIAGNOSIS — L85.8: Primary | ICD-10-CM

## 2021-05-13 DIAGNOSIS — Z90.81 HISTORY OF PARTIAL SPLENECTOMY: ICD-10-CM

## 2021-05-13 PROCEDURE — 99496 TRANSJ CARE MGMT HIGH F2F 7D: CPT | Performed by: FAMILY MEDICINE

## 2021-05-13 PROCEDURE — 88305 TISSUE EXAM BY PATHOLOGIST: CPT | Performed by: PATHOLOGY

## 2021-05-13 PROCEDURE — 69100 BIOPSY OF EXTERNAL EAR: CPT | Performed by: FAMILY MEDICINE

## 2021-05-13 NOTE — ASSESSMENT & PLAN NOTE
He has history of splenectomy as a child after a skiing accident  He did have abdominal US in July 2020 that showed a spleen was present measuring 8 9 x 7 8 x 2 4 cm  He has had pneumococcal and Meningococcal vaccines documented, will need to verify vaccine history  May need Hib, repeat MenB, Men ACWY

## 2021-05-13 NOTE — ASSESSMENT & PLAN NOTE
Lab Results   Component Value Date    HGBA1C 6 2 (H) 04/10/2021     Lab Results   Component Value Date    GLUF 127 (H) 05/11/2021    LDLCALC 33 04/10/2021    CREATININE 1 02 05/11/2021     Well controlled, continue current management with Lantus 25U, Jardiance 25 mg and metformin 1000 mg twice daily

## 2021-05-13 NOTE — ASSESSMENT & PLAN NOTE
Unclear source  Status post I&D, now on antibiotics with Linezolid and azithromycin to complete additional 11 days (EOT 5/20)  Referral placed to infectious disease by ortho  Discussed importance of completing course of antibiotics

## 2021-05-17 NOTE — DISCHARGE SUMMARY
Discharge Summary - Dalila Diaz 59 y o  male MRN: [id number]    Unit/Bed#: [address] [place] [address] Encounter: [id number]    Admission Date: 5/5/2021     Discharge Date: 5/8/21  Discharge Diagnosis: Septic right knee arthritis, status post I&D    Admitting Diagnosis: septic knee    Procedures Performed: Right knee arthroscopic washout 5/8/21  History and Hospital Course: Patient underwent arthroscopic I&D for right knee septic arthritis on 5/8/21  Ignacio pus was noted at the time of arthroscopy  Patient responded well to procedure and antibiotics with decrease in knee pain, swelling, and erythema  WBCs continued to trend down throughout hospital stay  Upon discharge there was still not growth on cultures  Complications: None    Condition at Discharge: stable     Discharge instructions/Information to patient and family:   Antibiotics per infectious disese  See after visit summary for information provided to patient and family  Provisions for Follow-Up Care: Follow-up in 7-10 days with Dr Demarco Laguerre outpatient  Disposition: Discharge to home  Discharge Medications:  See after visit summary for reconciled discharge medications provided to patient and family

## 2021-05-18 ENCOUNTER — TELEPHONE (OUTPATIENT)
Dept: FAMILY MEDICINE CLINIC | Facility: CLINIC | Age: 61
End: 2021-05-18

## 2021-05-18 NOTE — TELEPHONE ENCOUNTER
----- Message from Elie Cordova sent at 5/17/2021 10:55 PM EDT -----  Regarding: Non-Urgent Medical Question  Contact: [phone]  Continued lumps and soreness at IV sites from hospital stay, right and left forearms  Warm and cool compresses have not helped

## 2021-05-18 NOTE — TELEPHONE ENCOUNTER
Left message for patient to call office  Inquiring if arm is red, warm to touch?  Please offer patient an appointment to have arms checked

## 2021-05-26 LAB
BACTERIA SPEC BFLD CULT: NO GROWTH
BACTERIA SPEC BFLD CULT: NO GROWTH
GRAM STN SPEC: NORMAL

## 2021-06-28 ENCOUNTER — OFFICE VISIT (OUTPATIENT)
Dept: FAMILY MEDICINE CLINIC | Facility: CLINIC | Age: 61
End: 2021-06-28
Payer: COMMERCIAL

## 2021-06-28 VITALS
OXYGEN SATURATION: 96 % | SYSTOLIC BLOOD PRESSURE: 122 MMHG | HEART RATE: 88 BPM | DIASTOLIC BLOOD PRESSURE: 78 MMHG | RESPIRATION RATE: 16 BRPM | HEIGHT: 69 IN | WEIGHT: 185 LBS | TEMPERATURE: 99.2 F | BODY MASS INDEX: 27.4 KG/M2

## 2021-06-28 DIAGNOSIS — E11.9 TYPE 2 DIABETES MELLITUS WITHOUT COMPLICATION, WITH LONG-TERM CURRENT USE OF INSULIN (HCC): ICD-10-CM

## 2021-06-28 DIAGNOSIS — E66.3 OVERWEIGHT WITH BODY MASS INDEX (BMI) OF 29 TO 29.9 IN ADULT: ICD-10-CM

## 2021-06-28 DIAGNOSIS — Z12.11 SCREENING FOR COLON CANCER: ICD-10-CM

## 2021-06-28 DIAGNOSIS — F17.209 TOBACCO USE DISORDER, CONTINUOUS: ICD-10-CM

## 2021-06-28 DIAGNOSIS — R91.8 MULTIPLE PULMONARY NODULES: ICD-10-CM

## 2021-06-28 DIAGNOSIS — Z12.2 ENCOUNTER FOR SCREENING FOR LUNG CANCER: ICD-10-CM

## 2021-06-28 DIAGNOSIS — Z79.4 TYPE 2 DIABETES MELLITUS WITHOUT COMPLICATION, WITH LONG-TERM CURRENT USE OF INSULIN (HCC): ICD-10-CM

## 2021-06-28 DIAGNOSIS — G47.33 OSA (OBSTRUCTIVE SLEEP APNEA): ICD-10-CM

## 2021-06-28 DIAGNOSIS — E78.2 MIXED HYPERLIPIDEMIA: ICD-10-CM

## 2021-06-28 DIAGNOSIS — F17.200 TOBACCO DEPENDENCE: ICD-10-CM

## 2021-06-28 DIAGNOSIS — Z00.00 ANNUAL PHYSICAL EXAM: Primary | ICD-10-CM

## 2021-06-28 DIAGNOSIS — I10 ESSENTIAL HYPERTENSION: ICD-10-CM

## 2021-06-28 PROCEDURE — 99396 PREV VISIT EST AGE 40-64: CPT | Performed by: FAMILY MEDICINE

## 2021-06-28 RX ORDER — VARENICLINE TARTRATE
KIT
Qty: 53 TABLET | Refills: 0 | Status: SHIPPED | OUTPATIENT
Start: 2021-06-28 | End: 2021-08-20

## 2021-06-28 RX ORDER — VARENICLINE TARTRATE 1 MG/1
1 TABLET, FILM COATED ORAL 2 TIMES DAILY
Qty: 60 TABLET | Refills: 0 | Status: SHIPPED | OUTPATIENT
Start: 2021-06-28 | End: 2021-08-20

## 2021-06-28 NOTE — PATIENT INSTRUCTIONS
Set your quit date 8 days after starting Chantix  Though you can continue smoking, however you may have side effects such as nausea  Ideally you stop smoking 35 days after starting

## 2021-06-28 NOTE — ASSESSMENT & PLAN NOTE
Patient ready to quit at this time  Will start Chantix  Tobacco Cessation Counseling3: Tobacco cessation counseling and education was provided  The patient is sincerely urged to quit consumption of tobacco  He is ready to quit tobacco  The numerous health risks of tobacco consumption were discussed  Prescribed the following medications: varenicline (Chantix)

## 2021-06-28 NOTE — PROGRESS NOTES
ANNUAL PHYSICAL    Date of Service: 21  Primary Care Provider:   Valorie Ho MD       Name: Jeanette Pineda       : 1960       Age:61 y o  Sex: male      MRN: [id number]      Chief Complaint:Annual Exam     Assessment and Plan:  59 y o  male exam      1  Health Maintenance  - Colon Cancer Screening: last done at [address] [place] [address],[address]: Reviewed risks/benefits of screening and patient declines at this time  - Labs: none need at this time  - Immunizations: Reviewed  Recommend yearly flu vaccine  2  Other diagnoses addressed today:   Problem List Items Addressed This Visit        Endocrine    Type 2 diabetes mellitus, with long-term current use of insulin ([facility] [address] )     Lab Results   Component Value Date    HGBA1C 6 2 (H) 04/10/2021     Lab Results   Component Value Date    GLUF 127 (H) 2021    LDLCALC 33 04/10/2021    CREATININE 1 02 2021     Well controlled, reduce Lantus to 20U, continue Jardiance 25 mg and metformin 1000 mg twice daily            Respiratory    AZAR (obstructive sleep apnea)       Cardiovascular and Mediastinum    Essential hypertension     BP Readings from Last 3 Encounters:   21 122/78   21 124/78   21 115/72     Controlled, continue amlodipine 10 mg and lisinopril 40 mg daily            Other    Multiple pulmonary nodules     Patient has significant smoking history, has yearly CT scans done of his lungs, most recent 2019 with numerous nodules in the upper lobes and right middle and lower lobes  Nodules have some irregular margins with surrounding ground-glass haziness  Compared to previous CT scan some of the nodules have resolved well as have increased in size  It sounds like nodules have had a waxing and waning appearance  Will plan to repeat CT scan  Overweight with body mass index (BMI) of 29 to 29 9 in adult    Tobacco dependence     Patient ready to quit at this time  Will start Chantix      Tobacco Cessation Counseling3: Tobacco cessation counseling and education was provided  The patient is sincerely urged to quit consumption of tobacco  He is ready to quit tobacco  The numerous health risks of tobacco consumption were discussed  Prescribed the following medications: varenicline (Chantix)  Relevant Medications    varenicline (CHANTIX) 1 mg tablet    varenicline (Chantix Starting Month Pak) 0 5 MG X 11 & 1 MG X 42 tablet    Hyperlipidemia     Lab Results   Component Value Date    HDL 46 04/10/2021    LDLCALC 33 04/10/2021    TRIG 75 7 04/10/2021     continue atorvastatin 20 mg           Other Visit Diagnoses     Annual physical exam    -  Primary    Encounter for screening for lung cancer        Relevant Orders    CT lung screening program    Screening for colon cancer        Relevant Orders    Ambulatory referral for colonoscopy           Immunizations and preventive care screenings were discussed with patient today  Appropriate education was printed on patient's after visit summary  Counseling:  · Alcohol/drug use: discussed moderation in alcohol intake, the recommendations for healthy alcohol use, and avoidance of illicit drug use  · Dental Health: discussed importance of regular tooth brushing, flossing, and dental visits  · Injury prevention: discussed safety/seat belts, safety helmets, smoke detectors, carbon dioxide detectors, and smoking near bedding or upholstery  · Exercise: the importance of regular exercise/physical activity was discussed  Recommend exercise 3-5 times per week for at least 30 minutes  RTC 1 year for annual  visit or sooner PRN    Subjective:    Dalila Diaz is a 59 y o  male and is here for his comprehensive physical exam      Acute complaints: none  Diabetes  He is controlled on Lantus 25 U, Jardiance 25 mg, metformin 1000 mg twice daily  He reports fasting readings of 80s-120s       Diet and Physical Activity  · Diet/Nutrition: does follow a well balanced diet  · Exercise: no formal exercise  General Health  · Vision: no vision problems and goes for regular eye exams  · Dental: regular dental visits          Histories Updated and Reviewed 6/28/2021:  Patient's Medications   New Prescriptions    VARENICLINE (CHANTIX STARTING MONTH PAK) 0 5 MG X 11 & 1 MG X 42 TABLET    Take 0 5mg tab by mouth daily for 3 days, then increase to 0 5mg tab twice daily for 3 days, then increase to 1mg twice daily    VARENICLINE (CHANTIX) 1 MG TABLET    Take 1 tablet (1 mg total) by mouth 2 (two) times a day   Previous Medications    AMLODIPINE (NORVASC) 10 MG TABLET    Take 1 tablet (10 mg total) by mouth daily    ATORVASTATIN (LIPITOR) 20 MG TABLET    Take 1 tablet (20 mg total) by mouth daily    INSULIN GLARGINE (LANTUS SOLOSTAR) 100 UNITS/ML INJECTION PEN    Inject 20 Units under the skin daily    JARDIANCE 25 MG TABS    Take 1 tablet (25 mg total) by mouth daily    LISINOPRIL (ZESTRIL) 40 MG TABLET    daily Taking 1/2 tablet daily (20mg)    METFORMIN (GLUCOPHAGE) 1000 MG TABLET    Take 1 tablet (1,000 mg total) by mouth 2 (two) times a day    METOPROLOL TARTRATE (LOPRESSOR) 50 MG TABLET    Take 1 tablet (50 mg total) by mouth daily   Modified Medications    No medications on file   Discontinued Medications    No medications on file     Allergies   Allergen Reactions    Amoxicillin Rash    Amoxicillin-Pot Clavulanate Rash     Past Medical History:   Diagnosis Date    Diabetes mellitus ([facility] [address] )     Hyperlipidemia     Hypertension     Squamous cell carcinoma of leg, right      Social History     Socioeconomic History    Marital status: /Civil Union     Spouse name: Not on file    Number of children: Not on file    Years of education: Not on file    Highest education level: Not on file   Occupational History    Not on file   Tobacco Use    Smoking status: Current Every Day Smoker     Packs/day: 1 00     Years: 45 00     Pack years: 45 00     Types: Cigarettes    Smokeless tobacco: Never Used   Vaping Use    Vaping Use: Never used   Substance and Sexual Activity    Alcohol use: Yes     Alcohol/week: 1 0 standard drinks     Types: 1 Standard drinks or equivalent per week    Drug use: Never    Sexual activity: Yes     Partners: Female   Other Topics Concern    Not on file   Social History Narrative    Not on file     Social Determinants of Health     Financial Resource Strain:     Difficulty of Paying Living Expenses:    Food Insecurity:     Worried About Running Out of Food in the Last Year:     [address] in the Last Year:    Transportation Needs:     Lack of Transportation (Medical):      Lack of Transportation (Non-Medical):    Physical Activity:     Days of Exercise per Week:     Minutes of Exercise per Session:    Stress:     Feeling of Stress :    Social Connections:     Frequency of Communication with Friends and Family:     Frequency of Social Gatherings with Friends and Family:     Attends [employer] Services:     Active Member of Clubs or Organizations:     Attends Club or Organization Meetings:     Marital Status:    Intimate Partner Violence:     Fear of Current or Ex-Partner:     Emotionally Abused:     Physically Abused:     Sexually Abused:      Immunization History   Administered Date(s) Administered    H1N1, All Formulations 11/02/2009    INFLUENZA 09/29/2020    Influenza, injectable, quadrivalent, preservative free 0 5 mL 09/29/2020    Meningococcal B, OMV (Janelle Dakin) 04/16/2019, 05/28/2019    Meningococcal MCV4P 04/16/2019    Pneumococcal Polysaccharide PPV23 05/28/2019, 06/19/2019    SARS-CoV-2 / COVID-19 mRNA IM (Pfizer-BioNTech) 03/20/2021, 04/10/2021       PHQ-9 Depression Screening    PHQ-9:   Frequency of the following problems over the past two weeks:      Little interest or pleasure in doing things: 0 - not at all  Feeling down, depressed, or hopeless: 0 - not at all  PHQ-2 Score: 0 Objective:  /78   Pulse 88   Temp 99 2 °F (37 3 °C)   Resp 16   Ht 5' 9" (1 753 m)   Wt 83 9 kg (185 lb)   SpO2 96%   BMI 27 32 kg/m²   BP Readings from Last 3 Encounters:   06/28/21 122/78   05/13/21 124/78   05/12/21 115/72      Wt Readings from Last 3 Encounters:   06/28/21 83 9 kg (185 lb)   05/13/21 84 8 kg (187 lb)   05/12/21 85 3 kg (188 lb)      Physical Exam  Constitutional:       General: He is not in acute distress  Appearance: Normal appearance  He is not ill-appearing or toxic-appearing  HENT:      Head: Normocephalic and atraumatic  Right Ear: External ear normal       Left Ear: External ear normal       Nose: Nose normal       Mouth/Throat:      Mouth: Mucous membranes are moist    Eyes:      Extraocular Movements: Extraocular movements intact  Conjunctiva/sclera: Conjunctivae normal    Cardiovascular:      Rate and Rhythm: Normal rate and regular rhythm  Pulses: Normal pulses  Heart sounds: Normal heart sounds  Pulmonary:      Effort: Pulmonary effort is normal  No respiratory distress  Breath sounds: Normal breath sounds  Musculoskeletal:      Cervical back: Normal range of motion and neck supple  No rigidity  Right lower leg: No edema  Left lower leg: No edema  Skin:     General: Skin is warm and dry  Findings: No erythema or rash  Neurological:      General: No focal deficit present  Mental Status: He is alert and oriented to person, place, and time     Psychiatric:         Mood and Affect: Mood normal          Behavior: Behavior normal        Lab Results   Component Value Date    SODIUM 140 05/11/2021    K 3 9 05/11/2021     05/11/2021    CO2 29 05/11/2021    BUN 13 05/11/2021    CREATININE 1 02 05/11/2021    GLUC 118 05/09/2021    CALCIUM 9 6 05/11/2021     Lab Results   Component Value Date    CHOLESTEROL 94 04/10/2021     Lab Results   Component Value Date    HDL 46 04/10/2021     Lab Results   Component Value Date    TRIG 75 7 04/10/2021     No results found for: Julissa  Lab Results   Component Value Date    LDLCALC 33 04/10/2021     Lab Results   Component Value Date    HGBA1C 6 2 (H) 04/10/2021         Patient Care Team:  Lori Faria MD as PCP - General (Family Medicine)    Lori Faria MD    Note: Portions of the record may have been created with voice recognition software  Occasional wrong word or "sound a like" substitutions may have occurred due to the inherent limitations of voice recognition software  Read the chart carefully and recognize, using context, where substitutions have occurred

## 2021-06-28 NOTE — ASSESSMENT & PLAN NOTE
Lab Results   Component Value Date    HGBA1C 6 2 (H) 04/10/2021     Lab Results   Component Value Date    GLUF 127 (H) 05/11/2021    LDLCALC 33 04/10/2021    CREATININE 1 02 05/11/2021     Well controlled, reduce Lantus to 20U, continue Jardiance 25 mg and metformin 1000 mg twice daily

## 2021-06-28 NOTE — ASSESSMENT & PLAN NOTE
Lab Results   Component Value Date    HDL 46 04/10/2021    LDLCALC 33 04/10/2021    TRIG 75 7 04/10/2021     continue atorvastatin 20 mg

## 2021-06-28 NOTE — ASSESSMENT & PLAN NOTE
BP Readings from Last 3 Encounters:   06/28/21 122/78   05/13/21 124/78   05/12/21 115/72     Controlled, continue amlodipine 10 mg and lisinopril 40 mg daily

## 2021-06-29 ENCOUNTER — TELEPHONE (OUTPATIENT)
Dept: GASTROENTEROLOGY | Facility: CLINIC | Age: 61
End: 2021-06-29

## 2021-06-29 NOTE — TELEPHONE ENCOUNTER
Received order from Dr Kristal Singer to call and schedule patient for a colonoscopy screening  He already answered questions that he didn't want to see the doctor prior according to order in workque  I left message for him to call and schedule  Will call again in 1 week if he doesn't return call

## 2021-07-06 NOTE — TELEPHONE ENCOUNTER
Left a second message for patient to call and schedule colonoscopy screening per Dr Kristal Singer  Will call again in 2 weeks if he doesn't return call

## 2021-07-09 ENCOUNTER — TELEPHONE (OUTPATIENT)
Dept: GASTROENTEROLOGY | Facility: CLINIC | Age: 61
End: 2021-07-09

## 2021-07-09 ENCOUNTER — PREP FOR PROCEDURE (OUTPATIENT)
Dept: GASTROENTEROLOGY | Facility: CLINIC | Age: 61
End: 2021-07-09

## 2021-07-09 DIAGNOSIS — Z12.11 SCREENING FOR COLON CANCER: Primary | ICD-10-CM

## 2021-07-09 NOTE — TELEPHONE ENCOUNTER
07/09/21  Screened by: Manuel Galvez    Referring Provider [place]    Pre- Screening: There is no height or weight on file to calculate BMI 27 3  Has patient been referred for a routine screening Colonoscopy? yes  Is the patient between 39-70 years old? yes      Previous Colonoscopy yes   If yes:    Date: 8/18/2011    Facility: [facility]    Reason: screening      SCHEDULING STAFF: If the patient is between 45yrs-49yrs, please advise patient to confirm benefits/coverage with their insurance company for a routine screening colonoscopy, some insurance carriers will only cover at [facility] or older  If the patient is over 66years old, please schedule an office visit  Does the patient want to see a Gastroenterologist prior to their procedure OR are they having any GI symptoms? no    Has the patient been hospitalized or had abdominal surgery in the past 6 months? no    Does the patient use supplemental oxygen? no    Does the patient take Coumadin, Lovenox, Plavix, Elliquis, Xarelto, or other blood thinning medication? no    Has the patient had a stroke, cardiac event, or stent placed in the past year? no    SCHEDULING STAFF: If patient answers NO to above questions, then schedule procedure  If patient answers YES to above questions, then schedule office appointment  If patient is between 45yrs - 49yrs, please advise patient that we will have to confirm benefits & coverage with their insurance company for a routine screening colonoscopy

## 2021-07-19 DIAGNOSIS — I10 ESSENTIAL HYPERTENSION: ICD-10-CM

## 2021-07-19 DIAGNOSIS — Z79.4 TYPE 2 DIABETES MELLITUS WITHOUT COMPLICATION, WITH LONG-TERM CURRENT USE OF INSULIN (HCC): ICD-10-CM

## 2021-07-19 DIAGNOSIS — E11.9 TYPE 2 DIABETES MELLITUS WITHOUT COMPLICATION, WITH LONG-TERM CURRENT USE OF INSULIN (HCC): ICD-10-CM

## 2021-07-19 RX ORDER — AMLODIPINE BESYLATE 10 MG/1
10 TABLET ORAL DAILY
Qty: 90 TABLET | Refills: 2 | Status: SHIPPED | OUTPATIENT
Start: 2021-07-19 | End: 2022-04-01 | Stop reason: SDUPTHER

## 2021-07-28 DIAGNOSIS — E11.9 TYPE 2 DIABETES MELLITUS WITHOUT COMPLICATION, WITH LONG-TERM CURRENT USE OF INSULIN (HCC): ICD-10-CM

## 2021-07-28 DIAGNOSIS — Z79.4 TYPE 2 DIABETES MELLITUS WITHOUT COMPLICATION, WITH LONG-TERM CURRENT USE OF INSULIN (HCC): ICD-10-CM

## 2021-07-28 RX ORDER — EMPAGLIFLOZIN 25 MG/1
25 TABLET, FILM COATED ORAL DAILY
Qty: 90 TABLET | Refills: 1 | Status: SHIPPED | OUTPATIENT
Start: 2021-07-28 | End: 2022-01-17 | Stop reason: SDUPTHER

## 2021-07-31 ENCOUNTER — HOSPITAL ENCOUNTER (OUTPATIENT)
Dept: RADIOLOGY | Facility: HOSPITAL | Age: 61
Discharge: HOME | End: 2021-07-31
Payer: COMMERCIAL

## 2021-07-31 DIAGNOSIS — Z12.2 ENCOUNTER FOR SCREENING FOR LUNG CANCER: ICD-10-CM

## 2021-07-31 PROCEDURE — 71271 CT THORAX LUNG CANCER SCR C-: CPT

## 2021-08-18 DIAGNOSIS — L82.1 VERRUCOUS KERATOSIS: Primary | ICD-10-CM

## 2021-08-20 ENCOUNTER — HOSPITAL ENCOUNTER (OUTPATIENT)
Dept: GASTROENTEROLOGY | Facility: AMBULATORY SURGERY CENTER | Age: 61
Discharge: HOME | End: 2021-08-20
Payer: COMMERCIAL

## 2021-08-20 ENCOUNTER — ANESTHESIA EVENT (OUTPATIENT)
Dept: GASTROENTEROLOGY | Facility: AMBULATORY SURGERY CENTER | Age: 61
End: 2021-08-20

## 2021-08-20 ENCOUNTER — ANESTHESIA (OUTPATIENT)
Dept: GASTROENTEROLOGY | Facility: AMBULATORY SURGERY CENTER | Age: 61
End: 2021-08-20

## 2021-08-20 VITALS
SYSTOLIC BLOOD PRESSURE: 96 MMHG | HEIGHT: 69 IN | WEIGHT: 185 LBS | BODY MASS INDEX: 27.4 KG/M2 | OXYGEN SATURATION: 98 % | RESPIRATION RATE: 18 BRPM | TEMPERATURE: 96.8 F | HEART RATE: 81 BPM | DIASTOLIC BLOOD PRESSURE: 63 MMHG

## 2021-08-20 DIAGNOSIS — Z12.11 SCREENING FOR COLON CANCER: ICD-10-CM

## 2021-08-20 PROCEDURE — 88305 TISSUE EXAM BY PATHOLOGIST: CPT | Performed by: PATHOLOGY

## 2021-08-20 PROCEDURE — 00811 ANES LWR INTST NDSC NOS: CPT | Performed by: NURSE ANESTHETIST, CERTIFIED REGISTERED

## 2021-08-20 PROCEDURE — 45385 COLONOSCOPY W/LESION REMOVAL: CPT | Performed by: INTERNAL MEDICINE

## 2021-08-20 PROCEDURE — 45380 COLONOSCOPY AND BIOPSY: CPT | Performed by: INTERNAL MEDICINE

## 2021-08-20 RX ORDER — PROPOFOL 10 MG/ML
INJECTION, EMULSION INTRAVENOUS AS NEEDED
Status: DISCONTINUED | OUTPATIENT
Start: 2021-08-20 | End: 2021-08-20

## 2021-08-20 RX ORDER — SODIUM CHLORIDE 9 MG/ML
20 INJECTION, SOLUTION INTRAVENOUS CONTINUOUS
Status: DISCONTINUED | OUTPATIENT
Start: 2021-08-20 | End: 2021-08-24 | Stop reason: HOSPADM

## 2021-08-20 RX ORDER — SODIUM CHLORIDE 9 MG/ML
30 INJECTION, SOLUTION INTRAVENOUS CONTINUOUS
Status: DISCONTINUED | OUTPATIENT
Start: 2021-08-20 | End: 2021-08-24 | Stop reason: HOSPADM

## 2021-08-20 RX ORDER — SODIUM CHLORIDE 9 MG/ML
INJECTION, SOLUTION INTRAVENOUS CONTINUOUS PRN
Status: DISCONTINUED | OUTPATIENT
Start: 2021-08-20 | End: 2021-08-20

## 2021-08-20 RX ADMIN — PROPOFOL 50 MG: 10 INJECTION, EMULSION INTRAVENOUS at 09:53

## 2021-08-20 RX ADMIN — PROPOFOL 50 MG: 10 INJECTION, EMULSION INTRAVENOUS at 09:58

## 2021-08-20 RX ADMIN — PROPOFOL 50 MG: 10 INJECTION, EMULSION INTRAVENOUS at 09:56

## 2021-08-20 RX ADMIN — PROPOFOL 50 MG: 10 INJECTION, EMULSION INTRAVENOUS at 09:44

## 2021-08-20 RX ADMIN — PROPOFOL 50 MG: 10 INJECTION, EMULSION INTRAVENOUS at 09:50

## 2021-08-20 RX ADMIN — PROPOFOL 50 MG: 10 INJECTION, EMULSION INTRAVENOUS at 09:46

## 2021-08-20 RX ADMIN — SODIUM CHLORIDE: 9 INJECTION, SOLUTION INTRAVENOUS at 09:12

## 2021-08-20 RX ADMIN — PROPOFOL 50 MG: 10 INJECTION, EMULSION INTRAVENOUS at 09:48

## 2021-08-20 RX ADMIN — PROPOFOL 50 MG: 10 INJECTION, EMULSION INTRAVENOUS at 09:42

## 2021-08-20 NOTE — DISCHARGE INSTRUCTIONS
Sleep Apnea   WHAT YOU NEED TO KNOW:   What is sleep apnea? Sleep apnea is a condition that causes you to stop breathing for seconds or minutes at a time  This can happen many times during the night  What are the types of sleep apnea? · Obstructive sleep apnea (AZAR)  is the most common kind  The muscles and tissues around your throat relax and block air from passing through  Obesity, use of alcohol or cigarettes, or a family history are common causes  AZAR may increase your risk for complications after surgery  · Central sleep apnea (CSA)  means your brain does not send signals to the muscles that control breathing  You do not take a breath even though your airway is open  Common causes include medical conditions such as heart failure, being older than 40, or use of opioids  · Complex (or mixed) sleep apnea  means you have both obstructive and central sleep apnea  What are the signs and symptoms of sleep apnea? · Loud snoring or long pauses in breathing    · Feeling sleepy, slow, and tired during the day    · Snorting, gasping, or choking while you sleep, and waking up suddenly because of these    · Feeling irritable during the day    · Dry mouth or a headache in the mornings    · Heavy night sweating    · A hard time thinking, remembering things, or focusing on your tasks the following day    How is sleep apnea diagnosed? Your healthcare provider will ask about your signs and symptoms  Tell him or her about your medical history and what medicines you take  Your provider may ask if you smoke or if anyone in your family has sleep apnea  he or she may ask the person who sleeps beside you about your signs  You may need any of the following:  · A home sleep test  measures your heart rate, blood oxygen level, and breathing patterns  You wear a device on your finger while you sleep  · A sleep study at a facility may be needed   Your blood oxygen levels, movements, and heart, lung, and brain activity are monitored  How is sleep apnea treated? Treatment depends on the kind of apnea you have:  · A mouth device  may be needed if you have mild sleep apnea  These are designed to keep your throat open  Ask your dentist or healthcare provider about the best mouth device for you  · A machine  may be used to help you get more air during sleep  A mask may be placed over your nose and mouth, or just your nose  The mask is hooked to the machine  You will get air through the mask  ? A continuous positive airway pressure (CPAP) machine  is used to keep your airway open during sleep  The machine blows a gentle stream of air into the mask when you breathe  This helps keep your airway open so you can breathe more regularly  Extra oxygen may be given through the machine  ? A bilevel positive airway pressure (BiPAP) machine  gives air but lowers the pressure when you breathe out  ? An adaptive servo-ventilator (ASV)  is a machine that learns your usual breathing pattern  Then, it uses pressure to give you air and prevent stops in your breathing  · Surgery  to expand your airway or remove extra tissues may be needed  Surgery is usually only considered if other treatments do not work  How can I manage or prevent sleep apnea? · Reach and maintain a healthy weight  Ask your healthcare provider what a healthy weight is for you  Ask him or her to help you create a safe weight loss plan if you are overweight  Even a small goal of a 10% weight loss can improve your symptoms  · Do not smoke  Nicotine and other chemicals in cigarettes and cigars can cause lung damage  Ask your healthcare provider for information if you currently smoke and need help to quit  E-cigarettes or smokeless tobacco still contain nicotine  Talk to your healthcare provider before you use these products  · Do not drink alcohol or take sedative medicine before you go to sleep    Alcohol and sedatives can relax the muscles and tissues around your throat  This can block the airflow to your lungs  · Sleep on your side or use pillows designed to prevent sleep apnea  This prevents your tongue or other tissues from blocking your throat  You can also raise the head of your bed  Call your local emergency number (911 in the [address]) if:   · You have chest pain or trouble breathing  When should I call my doctor? · You have new or worsening signs or symptoms  · You have questions or concerns about your condition or care  CARE AGREEMENT:   You have the right to help plan your care  Learn about your health condition and how it may be treated  Discuss treatment options with your healthcare providers to decide what care you want to receive  You always have the right to refuse treatment  The above information is an  only  It is not intended as medical advice for individual conditions or treatments  Talk to your doctor, nurse or pharmacist before following any medical regimen to see if it is safe and effective for you  © Copyright [employer] 2021 Information is for End User's use only and may not be sold, redistributed or otherwise used for commercial purposes  All illustrations and images included in CareNotes® are the copyrighted property of [employer] A M , [employer]  or [address] [place] [address]  Colonoscopy   WHAT YOU NEED TO KNOW:   A colonoscopy is a procedure to examine the inside of your colon (intestine) with a scope  Polyps or tissue growths may have been removed during your colonoscopy  It is normal to feel bloated and to have some abdominal discomfort  You should be passing gas  If you have hemorrhoids or you had polyps removed, you may have a small amount of bleeding  DISCHARGE INSTRUCTIONS:   Seek care immediately if:    You have sudden, severe abdominal pain   You have problems swallowing   You have a large amount of black, sticky bowel movements or blood in your bowel movements        You have sudden trouble breathing   You feel weak, lightheaded, or faint or your heart beats faster than normal for you  Contact your healthcare provider if:    You have a fever and chills   You have nausea or are vomiting   Your abdomen is bloated or feels full and hard   You have abdominal pain   You have black, sticky bowel movements or blood in your bowel movements   You have not had a bowel movement for 3 days after your procedure   You have rash or hives   You have questions or concerns about your procedure  Activity:    Do not lift, strain, or run for 24 hours after your procedure   Rest after your procedure  You have been given medicine to relax you  Do not drive or make important decisions until the day after your procedure  Return to your normal activity as directed   Relieve gas and discomfort from bloating by lying on your right side with a heating pad on your abdomen  You may need to take short walks to help the gas move out  Eat small meals until bloating is relieved  Follow up with your healthcare provider as directed: Write down your questions so you remember to ask them during your visits  If you take a blood thinner, please review the specific instructions from your endoscopist about when you should resume it  These can be found in the Recommendation and Your Medication list sections of this After Visit Summary  High Fiber Diet   WHAT YOU NEED TO KNOW:   What is a high-fiber diet? A high-fiber diet includes foods that have a high amount of fiber  Fiber is the part of fruits, vegetables, and grains that is not broken down by your body  Fiber keeps your bowel movements regular  Fiber can also help lower your cholesterol level, control blood sugar in people with diabetes, and relieve constipation  Fiber can also help you control your weight because it helps you feel full faster  Most adults should eat 25 to 35 grams of fiber each day   Talk to your dietitian or healthcare provider about the amount of fiber you need  What foods are good sources of fiber? · Foods with at least 4 grams of fiber per serving:      ? ? to ½ cup of high-fiber cereal (check the nutrition label on the box)    ? ½ cup of blackberries or raspberries    ? 4 dried prunes    ? 1 cooked artichoke    ? ½ cup of cooked legumes, such as lentils, or red, kidney, and webb beans    · Foods with 1 to 3 grams of fiber per serving:      ? 1 slice of whole-wheat, pumpernickel, or rye bread    ? ½ cup of cooked brown rice    ? 4 whole-wheat crackers    ? 1 cup of oatmeal    ? ½ cup of cereal with 1 to 3 grams of fiber per serving (check the nutrition label on the box)    ? 1 small piece of fruit, such as an apple, banana, pear, kiwi, or orange    ? 3 dates    ? ½ cup of canned apricots, fruit cocktail, peaches, or pears    ? ½ cup of raw or cooked vegetables, such as carrots, cauliflower, cabbage, spinach, squash, or corn  What are some ways that I can increase fiber in my diet? · Choose brown or wild rice instead of white rice  · Use whole wheat flour in recipes instead of white or all-purpose flour  · Add beans and peas to casseroles or soups  · Choose fresh fruit and vegetables with peels or skins on instead of juices  What other guidelines should I follow? · Add fiber to your diet slowly  You may have abdominal discomfort, bloating, and gas if you add fiber to your diet too quickly  · Drink plenty of liquids as you add fiber to your diet  You may have nausea or develop constipation if you do not drink enough water  Ask how much liquid to drink each day and which liquids are best for you  CARE AGREEMENT:   You have the right to help plan your care  Discuss treatment options with your healthcare provider to decide what care you want to receive  You always have the right to refuse treatment  The above information is an  only   It is not intended as medical advice for individual conditions or treatments  Talk to your doctor, nurse or pharmacist before following any medical regimen to see if it is safe and effective for you  © Copyright [employer] 2021 Information is for End User's use only and may not be sold, redistributed or otherwise used for commercial purposes  All illustrations and images included in CareNotes® are the copyrighted property of [employer] A M [employer] Inc  or [employer]   Hemorrhoids   WHAT YOU NEED TO KNOW:   What are hemorrhoids? Hemorrhoids are swollen blood vessels inside your rectum (internal hemorrhoids) or on your anus (external hemorrhoids)  Sometimes a hemorrhoid may prolapse  This means it extends out of your anus  What increases my risk for hemorrhoids? · Pregnancy or obesity    · Straining or sitting for a long time during bowel movements    · Liver disease    · Weak muscles around the anus caused by older age, rectal surgery, or anal intercourse    · A lack of physical activity    · Chronic diarrhea or constipation    · A low-fiber diet    What are the signs and symptoms of hemorrhoids? · Pain or itching around your anus or inside your rectum    · Swelling or bumps around your anus    · Bright red blood in your bowel movement, on the toilet paper, or in the toilet bowl    · Tissue bulging out of your anus (prolapsed hemorrhoids)    · Incontinence (poor control over urine or bowel movements)    How are hemorrhoids diagnosed? Your healthcare provider will ask about your symptoms, the foods you eat, and your bowel movements  He or she will examine your anus for external hemorrhoids  You may need the following:  · A digital rectal exam  is a test to check for hemorrhoids  Your healthcare provider will put a gloved finger inside your anus to feel for the hemorrhoids  · An anoscopy  is a test that uses a scope (small tube with a light and camera on the end) to look at your hemorrhoids  How are hemorrhoids treated?   Treatment will depend on your symptoms  You may need any of the following:  · Medicines  can help decrease pain and swelling, and soften your bowel movement  The medicine may be a pill, pad, cream, or ointment  · Procedures  may be used to shrink or remove your hemorrhoid  Examples include rubber-band ligation, sclerotherapy, and photocoagulation  These procedures may be done in your healthcare provider's office  Ask your healthcare provider for more information about these procedures  · Surgery  may be needed to shrink or remove your hemorrhoids  How can I manage my symptoms? · Apply ice on your anus for 15 to 20 minutes every hour or as directed  Use an ice pack, or put crushed ice in a plastic bag  Cover it with a towel before you apply it to your anus  Ice helps prevent tissue damage and decreases swelling and pain  · Take a sitz bath  Fill a bathtub with 4 to 6 inches of warm water  You may also use a sitz bath pan that fits inside a toilet bowl  Sit in the sitz bath for 15 minutes  Do this 3 times a day, and after each bowel movement  The warm water can help decrease pain and swelling  · Keep your anal area clean  Gently wash the area with warm water daily  Soap may irritate the area  After a bowel movement, wipe with moist towelettes or wet toilet paper  Dry toilet paper can irritate the area  How can I help prevent hemorrhoids? · Do not strain to have a bowel movement  Do not sit on the toilet too long  These actions can increase pressure on the tissues in your rectum and anus  · Drink plenty of liquids  Liquids can help prevent constipation  Ask how much liquid to drink each day and which liquids are best for you  · Eat a variety of high-fiber foods  Examples include fruits, vegetables, and whole grains  Ask your healthcare provider how much fiber you need each day  You may need to take a fiber supplement  · Exercise as directed    Exercise, such as walking, may make it easier to have a bowel movement  Ask your healthcare provider to help you create an exercise plan  · Do not have anal sex  Anal sex can weaken the skin around your rectum and anus  · Avoid heavy lifting  This can cause straining and increase your risk for another hemorrhoid  When should I seek immediate care? · You have severe pain in your rectum or around your anus  · You have severe pain in your abdomen and you are vomiting  · You have bleeding from your anus that soaks through your underwear  When should I contact my healthcare provider? · You have frequent and painful bowel movements  · Your hemorrhoid looks or feels more swollen than usual      · You do not have a bowel movement for 2 days or more  · You see or feel tissue coming through your anus  · You have questions or concerns about your condition or care  CARE AGREEMENT:   You have the right to help plan your care  Learn about your health condition and how it may be treated  Discuss treatment options with your healthcare providers to decide what care you want to receive  You always have the right to refuse treatment  The above information is an  only  It is not intended as medical advice for individual conditions or treatments  Talk to your doctor, nurse or pharmacist before following any medical regimen to see if it is safe and effective for you  © Copyright [employer] 2021 Information is for End User's use only and may not be sold, redistributed or otherwise used for commercial purposes  All illustrations and images included in CareNotes® are the copyrighted property of [employer]  or [employer]  Diverticulosis   WHAT YOU NEED TO KNOW:   What is diverticulosis? Diverticulosis is a condition that causes small pockets called diverticula to form in your intestine  These pockets make it difficult for bowel movements to pass through your digestive system  What causes diverticulosis?   Diverticula form when muscles have to work hard to move bowel movements through the intestine  The force causes bulges to form at weak areas in the intestine  This may happen if you eat foods that are low in fiber  Fiber helps give your bowel movements more bulk so they are larger and easier to move through your colon  The following may increase your risk of diverticulosis:  · A history of constipation    · Age 36 or older    · Obesity    · Lack of exercise    What are the signs and symptoms of diverticulosis? Diverticulosis usually does not cause any signs or symptoms  It may cause any of the following in some people:  · Pain or discomfort in your lower abdomen    · Abdominal bloating    · Constipation or diarrhea    How is diverticulosis diagnosed? Your healthcare provider will examine you and ask about your bowel movements, diet, and symptoms  He or she will also ask about any medical conditions you have or medicines you take  You may need any of the following:  · Blood tests  may be done to check for signs of inflammation  · A barium enema  is an x-ray of your colon that may show diverticula  A tube is put into your anus, and a liquid called barium is put through the tube  Barium is used so that healthcare providers can see your colon more clearly  · Flexible sigmoidoscopy  is a test to look for any changes in your lower intestines and rectum  It may also show the cause of any bleeding or pain  A soft, bendable tube with a light on the end will be put into your anus  It will then be moved forward into your intestine  · A colonoscopy  is used to look at your whole colon  A scope (long bendable tube with a light on the end) is used to take pictures  This test may show diverticula  · A CT scan , or CAT scan, may show diverticula  You may be given contrast liquid before the scan  Tell the healthcare provider if you have ever had an allergic reaction to contrast liquid  How is diverticulosis managed?   The goal of treatment is to manage any symptoms you have and prevent other problems such as diverticulitis  Diverticulitis is swelling or infection of the diverticula  Your healthcare provider may recommend any of the following:  · Eat a variety of high-fiber foods  High-fiber foods help you have regular bowel movements  High-fiber foods include cooked beans, fruits, vegetables, and some cereals  Most adults need 25 to 35 grams of fiber each day  Your healthcare provider may recommend that you have more  Ask your healthcare provider how much fiber you need  Increase fiber slowly  You may have abdominal discomfort, bloating, and gas if you add fiber to your diet too quickly  You may need to take a fiber supplement if you are not getting enough fiber from food  · Medicines  to soften your bowel movements may be given  You may also need medicines to treat symptoms such as bloating and pain  · Drink liquids as directed  You may need to drink 2 to 3 liters (8 to 12 cups) of liquids every day  Ask your healthcare provider how much liquid to drink each day and which liquids are best for you  · Apply heat  on your abdomen for 20 to 30 minutes every 2 hours for as many days as directed  Heat helps decrease pain and muscle spasms  How can I help prevent diverticulitis or other symptoms? The following may help decrease your risk for diverticulitis or symptoms, such as bleeding  Talk to your provider about these or other things you can do to prevent problems that may occur with diverticulosis  · Exercise regularly  Ask your healthcare provider about the best exercise plan for you  Exercise can help you have regular bowel movements  Get 30 minutes of exercise on most days of the week  · Maintain a healthy weight  Ask your healthcare provider how much you should weigh  Ask him or her to help you create a weight loss plan if you are overweight  · Do not smoke    Nicotine and other chemicals in cigarettes increase your risk for diverticulitis  Ask your healthcare provider for information if you currently smoke and need help to quit  E-cigarettes or smokeless tobacco still contain nicotine  Talk to your healthcare provider before you use these products  · Ask your healthcare provider if it is safe to take NSAIDs  NSAIDs may increase your risk of diverticulitis  When should I seek immediate care? · You have severe pain on the left side of your lower abdomen  · Your bowel movements are bright or dark red  When should I contact my healthcare provider? · You have a fever and chills  · You feel dizzy or lightheaded  · You have nausea, or you are vomiting  · You have a change in your bowel movements  · You have questions or concerns about your condition or care  CARE AGREEMENT:   You have the right to help plan your care  Learn about your health condition and how it may be treated  Discuss treatment options with your healthcare providers to decide what care you want to receive  You always have the right to refuse treatment  The above information is an  only  It is not intended as medical advice for individual conditions or treatments  Talk to your doctor, nurse or pharmacist before following any medical regimen to see if it is safe and effective for you  © Copyright [employer] 2021 Information is for End User's use only and may not be sold, redistributed or otherwise used for commercial purposes  All illustrations and images included in CareNotes® are the copyrighted property of [employer]  or [facility] [place]   Colorectal Polyps   WHAT YOU NEED TO KNOW:   What are colorectal polyps? Colorectal polyps are small growths of tissue in the lining of the colon and rectum  Most polyps are usually benign (not cancer)  Certain types of polyps, called adenomatous polyps, may turn into cancer  What increases my risk for colorectal polyps?   The exact cause of colorectal polyps is unknown  The following may increase your risk:  · Older age    · Foods high in fat and low in fiber    · Family history of polyps    · Intestinal diseases, such as Crohn disease or ulcerative colitis    · Smoking cigarettes or drinking alcohol    · Lack of physical activity, such as exercise    · Obesity    What are the signs and symptoms of colorectal polyps? · Blood in your bowel movement or bleeding from the rectum    · Change in bowel movement habits, such as diarrhea or constipation    · Abdominal pain    How are colorectal polyps diagnosed? You should have fecal blood screening 1 time each year for colorectal disease if you are older than 50 years  You should be screened earlier if you have an intestinal disease or a family history of polyps or colorectal cancer  During this screening, a sample of your bowel movement is checked for blood, which may be an early sign of colorectal polyps or cancer  You may also need any of the following tests:  · A digital rectal exam  means your healthcare provider will use a finger to check for polyps  · A barium enema  is an x-ray of the colon  A tube is put into your anus, and a liquid called barium is put through the tube  Barium is used so that healthcare providers can see your colon better on the x-ray film  · A virtual colonoscopy  is a CT scan that takes pictures of the inside of your colon and rectum  A small, flexible tube is put into your rectum and air or carbon dioxide (gas) is used to expand your colon  This lets healthcare providers clearly see your colon and any polyps on a monitor  · Colonoscopy or sigmoidoscopy  are procedures to help your healthcare provider see the inside of your colon  He or she will use a flexible tube with a small light and camera on the end  During a sigmoidoscopy, your healthcare provider will only look at rectum and lower colon  During a colonoscopy, he or she will look at the full length of your colon   A small amount of tissue may be removed from your colon for tests  How are colorectal polyps treated? Small, benign polyps may not need treatment  Your healthcare provider will check the polyp over time to make sure it is not changing  Polyps that are cancer may be removed with one of the following:  · A polypectomy  is a minimally invasive procedure to remove a polyp during a colonoscopy or sigmoidoscopy  Your healthcare provider may need to remove the polyp with a laparoscope  Laparoscopy is done by inserting a small, flexible scope into incisions made on your abdomen  · Surgery  may be needed to remove large or deep polyps that cannot be removed in a polypectomy  Tissues or lymph nodes near a polyp may also be removed  This helps stop cancer from spreading  What can I do to lower my risk for colorectal polyps? · Eat a variety of healthy foods  Healthy foods include fruit, vegetables, whole-grain breads, low-fat dairy products, beans, lean meat, and fish  Ask if you need to be on a special diet  · Maintain a healthy weight  Ask your healthcare provider what a healthy weight is for you  Ask him or her to help with a weight loss plan if you are overweight  · Exercise as directed  Begin to exercise slowly and do more as you get stronger  Talk with your healthcare provider before you start an exercise program          · Limit alcohol  Your risk for polyps increases the more you drink  · Do not smoke  Nicotine and other chemicals in cigarettes and cigars increase your risk for polyps  Ask your healthcare provider for information if you currently smoke and need help to quit  E-cigarettes or smokeless tobacco still contain nicotine  Talk to your healthcare provider before you use these products  Where can I find more information?    · [address] ([facility])  [address], [place] [address]  Phone: [phone]  Web Address: www digestive  niddk nih gov    Call your local emergency number (911 in the [address]) if:   · You have sudden shortness of breath  · You have a fast heart rate, fast breathing, or are too dizzy to stand up  When should I seek immediate care? · You have severe abdominal pain  · You see blood in your bowel movement  When should I call my doctor? · You have a fever  · You have chills, a cough, or feel weak and achy  · You have abdominal pain that does not go away or gets worse after you take medicine  · Your abdomen is swollen  · You are losing weight without trying  · You have questions or concerns about your condition or care  CARE AGREEMENT:   You have the right to help plan your care  Learn about your health condition and how it may be treated  Discuss treatment options with your healthcare providers to decide what care you want to receive  You always have the right to refuse treatment  The above information is an  only  It is not intended as medical advice for individual conditions or treatments  Talk to your doctor, nurse or pharmacist before following any medical regimen to see if it is safe and effective for you  © Copyright [employer] 2021 Information is for End User's use only and may not be sold, redistributed or otherwise used for commercial purposes  All illustrations and images included in CareNotes® are the copyrighted property of [employer]  or [address] [place], [place] [address]  Diabetes Type 1: Management   WHAT YOU NEED TO KNOW:   The goal of managing type 1 diabetes is to delay or prevent complications  Complications can include kidney disease, heart and blood vessel disease, and eye and skin conditions  DISCHARGE INSTRUCTIONS:   Have someone call your local emergency number (911 in the [address]) if:   · You cannot be woken  · You have signs of diabetic ketoacidosis:     ? confusion, fatigue    ? vomiting    ?  rapid heartbeat    ? fruity smelling breath    ? extreme thirst    ? dry mouth and skin    · You have any of the following signs of a heart attack:      ? Squeezing, pressure, or pain in your chest    ? You may  also have any of the following:     § Discomfort or pain in your back, neck, jaw, stomach, or arm    § Shortness of breath    § Nausea or vomiting    § Lightheadedness or a sudden cold sweat    · You have any of the following signs of a stroke:      ? Numbness or drooping on one side of your face     ? Weakness in an arm or leg    ? Confusion or difficulty speaking    ? Dizziness, a severe headache, or vision loss    Call your provider if:   · You have a sore or wound that will not heal     · You have a change in the amount you urinate  · Your blood sugar levels are higher than your target goals  · You often have lower blood sugar levels than your target goals  · Your skin is red, dry, warm, or swollen  · You have trouble coping with diabetes, or you feel anxious or depressed  · You have questions or concerns about your condition or care  Manage your type 1 diabetes:   · Check your blood sugar levels as directed and as needed  Several items are available to use to check your levels  Talk with your provider to find out which is best for you  The goal for blood sugar levels before meals  is between 80 and 130 mg/dL and 2 hours after eating  is lower than 180 mg/dL  · Know what to do if your blood sugar level is too high or too low  Levels that remain too high or too low can be life-threatening  Learn the signs or symptoms of high blood sugar levels such, as increased urination and fatigue  Also learn the signs or symptoms of low levels such as shakiness, sweating, or irritability  Always glucose tablets or glucose gel to take if levels are too low  · Take your insulin as directed  You will need insulin for the rest of your life  Your insulin may be given by injections, or you may have an insulin pump   The pump gives you a constant amount of insulin through the day  The amount changes when the number of carbohydrates (carbs) are entered  Your provider will talk to you about the best way for you to receive insulin  · Count your carbs, protein, and fats correctly  You will learn the amount of carbs, protein, and fat you will need every day  Each meal should have a balance of carbs, proteins, and fats  Eat whole grains, raw fruits, and steamed vegetables for fiber  · Know the risks if you choose to drink alcohol  Alcohol can cause your blood sugar levels to be low if you use insulin  Alcohol can cause high blood sugar levels and weight gain if you drink too much  Women 21 years or older and men 72 years or older should limit alcohol to 1 drink a day  Men aged 24 to 59 years should limit alcohol to 2 drinks a day  A drink of alcohol is 12 ounces of beer, 5 ounces of wine, or 1½ ounces of liquor  · Do not smoke  Nicotine and other chemicals in cigarettes and cigars can cause lung and blood vessel damage  It also makes it more difficult to manage your diabetes  Ask your provider for information if you currently smoke and need help to quit  Do not use e-cigarettes or smokeless tobacco in place of cigarettes or to help you quit  They still contain nicotine  · Be active 5 days or more, for a total of 150 minutes, in a week  Physical activity helps to lower your blood sugar levels and helps with weight management  It can also help decrease kidney and heart problems  Your provider can help you create an activity plan  The plan can include the best activities for you  The plan will tell you what your blood sugar level should be before exercise  Check your blood sugar level before and after you exercise and before driving  · Check your feet each day for cuts, scratches, calluses, or other wounds  Look for redness and swelling, and feel for warmth  Wear shoes that fit well   Check your shoes for rocks or other objects that can hurt your feet  Do not walk barefoot or wear shoes without socks  Wear cotton socks to help keep your feet dry  · Wear medical alert identification  Wear medical alert jewelry or carry a card that says you have diabetes  Ask your provider where to get these items  · Go to follow-up appointments  You may be sent to different types of providers to check for complications of diabetes  Types of providers include heart (cardiologist), kidney (nephrologist), and nerve (neurologist) specialists  Complications of diabetes can start 3 to 5 years after your diagnosis  You will need to get your eyes checked every year or if you have problems with your vision  · Ask about vaccines  You have a higher risk for serious illness if you get the flu, pneumonia, or hepatitis  Ask your provider if you should get a flu, pneumonia, or hepatitis B vaccine, and when to get the vaccine  Follow up with your diabetes care team provider as directed: You may need to follow up with your provider more frequently if you are having problems  Write down your questions so you remember to ask them during your visits  © Copyright [employer] 2021 Information is for End User's use only and may not be sold, redistributed or otherwise used for commercial purposes  All illustrations and images included in CareNotes® are the copyrighted property of [employer]  or Emmanuel Jin  The above information is an  only  It is not intended as medical advice for individual conditions or treatments  Talk to your doctor, nurse or pharmacist before following any medical regimen to see if it is safe and effective for you

## 2021-08-20 NOTE — ANESTHESIA PREPROCEDURE EVALUATION
Procedure:  COLONOSCOPY    Relevant Problems   CARDIO   (+) Essential hypertension   (+) Hyperlipidemia      ENDO   (+) Type 2 diabetes mellitus, with long-term current use of insulin (HCC)      MUSCULOSKELETAL   (+) Septic arthritis of knee, right (HCC)      PULMONARY   (+) AZAR (obstructive sleep apnea)   (+) Smoking        Physical Exam    Airway    Mallampati score: I  TM Distance: >3 FB  Neck ROM: full     Dental   upper dentures,     Cardiovascular  Rhythm: regular, Rate: normal, Cardiovascular exam normal    Pulmonary  Pulmonary exam normal     Other Findings        Anesthesia Plan  ASA Score- 2     Anesthesia Type- IV sedation with anesthesia with ASA Monitors  Additional Monitors:   Airway Plan:           Plan Factors-Exercise tolerance (METS): >4 METS  Chart reviewed  EKG reviewed  Imaging results reviewed  Existing labs reviewed  Patient summary reviewed  Induction- intravenous  Postoperative Plan-     Informed Consent- Anesthetic plan and risks discussed with patient

## 2021-08-20 NOTE — H&P
History and Physical - [facility]  Georgina Montoya 59 y o  male MRN: [id number]                  HPI: Georgina Montoya is a 59y o  year old male who presents for colonoscopy      REVIEW OF SYSTEMS: Per the HPI, and otherwise unremarkable      Historical Information   Past Medical History:   Diagnosis Date    CPAP (continuous positive airway pressure) dependence     Diabetes mellitus (HCC)     GERD (gastroesophageal reflux disease)     Hyperlipidemia     Hypertension     Sleep apnea     Squamous cell carcinoma of leg, right      Past Surgical History:   Procedure Laterality Date    CHOLECYSTECTOMY      COLONOSCOPY      HAND TENDON SURGERY      KNEE ARTHROSCOPY      Knee, right    SPLENECTOMY, PARTIAL  1968    accident    TONSILLECTOMY      WOUND DEBRIDEMENT Right 5/5/2021    Procedure: RIGHT KNEE ARTHROSCOPY W/IRRIGATION AND DEBRIDEMENT OF SEPTIC ARTHRITIS;  Surgeon: Janine Scott MD;  Location: [facility];  Service: Orthopedics     Social History   Social History     Substance and Sexual Activity   Alcohol Use Yes    Alcohol/week: 1 0 standard drinks    Types: 1 Standard drinks or equivalent per week     Social History     Substance and Sexual Activity   Drug Use Never     Social History     Tobacco Use   Smoking Status Current Every Day Smoker    Packs/day: 1 00    Years: 45 00    Pack years: 45 00    Types: Cigarettes   Smokeless Tobacco Never Used     Family History   Problem Relation Age of Onset    Ovarian cancer Mother     Cancer Mother     Kidney disease Mother     Coronary artery disease Father     Diabetes Father     Cancer Brother        Meds/Allergies       Current Outpatient Medications:     amLODIPine (NORVASC) 10 mg tablet    atorvastatin (LIPITOR) 20 mg tablet    insulin glargine (Lantus SoloStar) 100 units/mL injection pen    Jardiance 25 MG TABS    lisinopril (ZESTRIL) 40 mg tablet    metFORMIN (GLUCOPHAGE) 1000 MG tablet    metoprolol tartrate (LOPRESSOR) 50 mg tablet    Current Facility-Administered Medications:     sodium chloride 0 9 % infusion, 30 mL/hr, Intravenous, Continuous    sodium chloride 0 9 % infusion, 20 mL/hr, Intravenous, Continuous    Facility-Administered Medications Ordered in Other Encounters:     sodium chloride 0 9 % infusion, , Intravenous, Continuous PRN, New Bag at 08/20/21 0912    Allergies   Allergen Reactions    Amoxicillin Rash    Amoxicillin-Pot Clavulanate Rash       Objective     /74   Pulse 82   Temp (!) 96 8 °F (36 °C) (Temporal)   Resp 18   Ht 5' 9" (1 753 m)   Wt 83 9 kg (185 lb)   SpO2 97%   BMI 27 32 kg/m²       PHYSICAL EXAM    Gen: NAD  Head: NCAT  CV: RRR  CHEST: Clear  ABD: soft, NT/ND  EXT: no edema      ASSESSMENT/PLAN:  This is a 59y o  year old male here for colonoscopy, and he is stable and optimized for his procedure

## 2021-09-08 DIAGNOSIS — E11.9 TYPE 2 DIABETES MELLITUS WITHOUT COMPLICATION, WITH LONG-TERM CURRENT USE OF INSULIN (HCC): Primary | ICD-10-CM

## 2021-09-08 DIAGNOSIS — Z79.4 TYPE 2 DIABETES MELLITUS WITHOUT COMPLICATION, WITH LONG-TERM CURRENT USE OF INSULIN (HCC): Primary | ICD-10-CM

## 2021-09-08 NOTE — TELEPHONE ENCOUNTER
Can you please send a prescription for Verio One Touch test strips to [facility]  I am almost out of them  Thanks very much

## 2021-09-14 DIAGNOSIS — Z79.4 TYPE 2 DIABETES MELLITUS WITHOUT COMPLICATION, WITH LONG-TERM CURRENT USE OF INSULIN (HCC): ICD-10-CM

## 2021-09-14 DIAGNOSIS — E11.9 TYPE 2 DIABETES MELLITUS WITHOUT COMPLICATION, WITH LONG-TERM CURRENT USE OF INSULIN (HCC): ICD-10-CM

## 2021-09-15 ENCOUNTER — TELEPHONE (OUTPATIENT)
Dept: PULMONOLOGY | Facility: CLINIC | Age: 61
End: 2021-09-15

## 2021-09-15 NOTE — TELEPHONE ENCOUNTER
Please call patient to schedule a follow up appointment in order for providers to continue to sign off on CPAP supplies         Last seen by Dr Ritesh Ureña for AZAR on 10/21/19

## 2021-09-27 PROBLEM — M00.9 SEPTIC ARTHRITIS OF KNEE, RIGHT (HCC): Status: RESOLVED | Noted: 2021-05-05 | Resolved: 2021-09-27

## 2021-09-28 ENCOUNTER — OFFICE VISIT (OUTPATIENT)
Dept: FAMILY MEDICINE CLINIC | Facility: CLINIC | Age: 61
End: 2021-09-28
Payer: COMMERCIAL

## 2021-09-28 VITALS
HEART RATE: 84 BPM | HEIGHT: 69 IN | RESPIRATION RATE: 16 BRPM | TEMPERATURE: 97.7 F | SYSTOLIC BLOOD PRESSURE: 118 MMHG | BODY MASS INDEX: 27.7 KG/M2 | WEIGHT: 187 LBS | DIASTOLIC BLOOD PRESSURE: 72 MMHG

## 2021-09-28 DIAGNOSIS — E78.2 MIXED HYPERLIPIDEMIA: ICD-10-CM

## 2021-09-28 DIAGNOSIS — G47.33 OSA (OBSTRUCTIVE SLEEP APNEA): ICD-10-CM

## 2021-09-28 DIAGNOSIS — I10 ESSENTIAL HYPERTENSION: ICD-10-CM

## 2021-09-28 DIAGNOSIS — Z79.4 TYPE 2 DIABETES MELLITUS WITHOUT COMPLICATION, WITH LONG-TERM CURRENT USE OF INSULIN (HCC): Primary | ICD-10-CM

## 2021-09-28 DIAGNOSIS — L82.1 VERRUCOUS KERATOSIS: ICD-10-CM

## 2021-09-28 DIAGNOSIS — E11.9 TYPE 2 DIABETES MELLITUS WITHOUT COMPLICATION, WITH LONG-TERM CURRENT USE OF INSULIN (HCC): Primary | ICD-10-CM

## 2021-09-28 DIAGNOSIS — F17.200 TOBACCO DEPENDENCE: ICD-10-CM

## 2021-09-28 DIAGNOSIS — R91.8 MULTIPLE PULMONARY NODULES: ICD-10-CM

## 2021-09-28 DIAGNOSIS — M25.472 LEFT ANKLE SWELLING: ICD-10-CM

## 2021-09-28 PROCEDURE — 99214 OFFICE O/P EST MOD 30 MIN: CPT | Performed by: FAMILY MEDICINE

## 2021-09-28 NOTE — ASSESSMENT & PLAN NOTE
Patient has history of AZAR, sleeps with CPAP machine  He would benefit from ongoing use of CPAP at night

## 2021-09-28 NOTE — PROGRESS NOTES
FAMILY MEDICINE PROGRESS NOTE    Date of Service: 21  Primary Care Provider:   Shaw Soto MD       Name: Magdiel Bee       : 1960       Age:61 y o  Sex: male      MRN: [id number]      Chief Complaint:Follow-up, Ankle Pain (left ankle pain ), and Mass (right ear)       ASSESSMENT and PLAN:  Magdiel Bee is a 59 y o  male with:     Problem List Items Addressed This Visit        Endocrine    Type 2 diabetes mellitus, with long-term current use of insulin ([facility] [address] ) - Primary     Lab Results   Component Value Date    HGBA1C 6 2 (H) 04/10/2021     Lab Results   Component Value Date    GLUF 127 (H) 2021    LDLCALC 33 04/10/2021    CREATININE 1 02 2021     Very well controlled, with one episode of hypoglycemia  Will reduce insulin to 10 U daily and continue Jardiance 25 mg and metformin 1000 mg twice daily  Discussed that we should continue to monitor A1C and if it remains less than 7 will plan to stop insulin all together  If needs additional agent for glycemic control would plan for non-insulin oral or injectable  Respiratory    AZAR (obstructive sleep apnea)     Patient has history of AZAR, sleeps with CPAP machine  He would benefit from ongoing use of CPAP at night            Cardiovascular and Mediastinum    Essential hypertension     BP Readings from Last 3 Encounters:   21 96/63   21 122/78   21 124/78     Controlled, continue amlodipine 10 mg and lisinopril 40 mg daily            Other    Multiple pulmonary nodules     Stable on recent CT scan in July  Continue yearly monitoring         Tobacco dependence     He never took chantix due to recall  Counseled on smoking cessation, patient not ready to quit at this time  Hyperlipidemia    Left ankle swelling     Chronic, has seen foot and ankle in the past  No acute injury or trauma  Recommend treating like ankle sprain with stretches and exercises              Other Visit Diagnoses     Verrucous keratosis            Lesion Destruction    Date/Time: 9/28/2021 5:03 PM  Performed by: Noé Rosales MD  Authorized by: Noé Rosales MD   Universal Protocol:  Consent: Verbal consent obtained  Time out: Immediately prior to procedure a "time out" was called to verify the correct patient, procedure, equipment, support staff and site/side marked as required  Patient understanding: patient states understanding of the procedure being performed  Patient consent: the patient's understanding of the procedure matches consent given  Procedure consent: procedure consent matches procedure scheduled  Relevant documents: relevant documents not present or verified  Test results: test results not available  Site marked: the operative site was marked  Radiology Images displayed and confirmed  If images not available, report reviewed: imaging studies not available  Required items: required blood products, implants, devices, and special equipment available  Patient identity confirmed: verbally with patient      Procedure Details - Lesion Destruction:     Number of Lesions:  1  Lesion 1:     [address]:  [address]:  R ear    Initial size (mm):  2    Final defect size (mm):  2    Malignancy: benign hyperkeratotic lesion      Destruction method: cryotherapy          SUBJECTIVE:  Lonnie Frazier is a 59 y o  male who presents today with a chief complaint of Follow-up, Ankle Pain (left ankle pain ), and Mass (right ear)  HPI     Patient was last seen in June for physical  He was ready to quit smoking and was started on Chantix at that time  Diabetes  He is controlled on Lantus 20 U, Jardiance 25 mg, metformin 1000 mg twice daily  He did report an episode of hypoglycemia last week, he felt warm  Ankle Pain  He has history of left ankle pain, was told he has some degenerative changes in his ankle by foot and ankle surgeon previously   He does have pes planus which was felt to be the cause of his ankle pain  He is on his feet a lot of work and this has caused more foot pain  He does wear shoe inserts  He has been taking ibuprofen at night  Review of Systems   Respiratory: Negative for shortness of breath  Cardiovascular: Negative for chest pain, palpitations and leg swelling  Musculoskeletal: Positive for arthralgias (left ankle pain)  I have reviewed the patient's Past Medical History  Current Outpatient Medications:     amLODIPine (NORVASC) 10 mg tablet, Take 1 tablet (10 mg total) by mouth daily, Disp: 90 tablet, Rfl: 2    atorvastatin (LIPITOR) 20 mg tablet, Take 1 tablet (20 mg total) by mouth daily, Disp: 90 tablet, Rfl: 4    glucose blood test strip, Use 1 each daily TEST DAILY   PATIENT HAS ONE TOUCH VERIO DEVICE, Disp: 100 strip, Rfl: 5    insulin glargine (Lantus SoloStar) 100 units/mL injection pen, Inject 20 Units under the skin daily, Disp: , Rfl:     Jardiance 25 MG TABS, Take 1 tablet (25 mg total) by mouth daily, Disp: 90 tablet, Rfl: 1    lisinopril (ZESTRIL) 40 mg tablet, daily Taking 1/2 tablet daily (20mg), Disp: , Rfl:     metFORMIN (GLUCOPHAGE) 1000 MG tablet, Take 1 tablet (1,000 mg total) by mouth 2 (two) times a day, Disp: 180 tablet, Rfl: 2    metoprolol tartrate (LOPRESSOR) 50 mg tablet, Take 1 tablet (50 mg total) by mouth daily, Disp: 90 tablet, Rfl: 3    OBJECTIVE:  /72   Pulse 84   Temp 97 7 °F (36 5 °C)   Resp 16   Ht 5' 9" (1 753 m)   Wt 84 8 kg (187 lb)   BMI 27 62 kg/m²    BP Readings from Last 3 Encounters:   09/28/21 118/72   08/20/21 96/63   06/28/21 122/78      Wt Readings from Last 3 Encounters:   09/28/21 84 8 kg (187 lb)   08/20/21 83 9 kg (185 lb)   06/28/21 83 9 kg (185 lb)      Physical Exam  Constitutional:       General: He is not in acute distress  Appearance: Normal appearance  He is not ill-appearing or toxic-appearing  HENT:      Head: Normocephalic and atraumatic        Right Ear: External ear normal       Left Ear: External ear normal    Eyes:      Extraocular Movements: Extraocular movements intact  Conjunctiva/sclera: Conjunctivae normal    Cardiovascular:      Rate and Rhythm: Normal rate and regular rhythm  Pulmonary:      Effort: Pulmonary effort is normal  No respiratory distress  Musculoskeletal:         General: Swelling present  Cervical back: Normal range of motion and neck supple  No rigidity  Left ankle: Swelling present  No deformity  Tenderness (over areas of swelling over medial ankle on and around medial malleous) present  Normal range of motion  Skin:     Findings: No erythema or rash  Neurological:      General: No focal deficit present  Mental Status: He is alert and oriented to person, place, and time  Psychiatric:         Mood and Affect: Mood normal          Behavior: Behavior normal                 Return in about 4 months (around 1/28/2022) for follow-up diabetes   Sagrario Richards MD    Note: Portions of the record have been created with voice recognition software  Occasional wrong word or "sound a like" substitutions may have occurred due to the inherent limitations of voice recognition software  Read the chart carefully and recognize, using context, where substitutions have occurred

## 2021-09-28 NOTE — ASSESSMENT & PLAN NOTE
BP Readings from Last 3 Encounters:   08/20/21 96/63   06/28/21 122/78   05/13/21 124/78     Controlled, continue amlodipine 10 mg and lisinopril 40 mg daily

## 2021-09-28 NOTE — ASSESSMENT & PLAN NOTE
Lab Results   Component Value Date    HGBA1C 6 2 (H) 04/10/2021     Lab Results   Component Value Date    GLUF 127 (H) 05/11/2021    LDLCALC 33 04/10/2021    CREATININE 1 02 05/11/2021     Very well controlled, with one episode of hypoglycemia  Will reduce insulin to 10 U daily and continue Jardiance 25 mg and metformin 1000 mg twice daily  Discussed that we should continue to monitor A1C and if it remains less than 7 will plan to stop insulin all together  If needs additional agent for glycemic control would plan for non-insulin oral or injectable

## 2021-09-29 PROBLEM — M25.472 LEFT ANKLE SWELLING: Status: ACTIVE | Noted: 2021-09-29

## 2021-09-29 NOTE — ASSESSMENT & PLAN NOTE
He never took chantix due to recall  Counseled on smoking cessation, patient not ready to quit at this time

## 2021-09-29 NOTE — ASSESSMENT & PLAN NOTE
Chronic, has seen foot and ankle in the past  No acute injury or trauma  Recommend treating like ankle sprain with stretches and exercises

## 2021-10-13 NOTE — TELEPHONE ENCOUNTER
Spoke with pt, will need to call back to set up appt because he is starting a new job plus other appts are going on

## 2021-11-06 ENCOUNTER — IMMUNIZATIONS (OUTPATIENT)
Dept: FAMILY MEDICINE CLINIC | Facility: HOSPITAL | Age: 61
End: 2021-11-06

## 2021-11-06 DIAGNOSIS — Z23 ENCOUNTER FOR IMMUNIZATION: Primary | ICD-10-CM

## 2021-11-06 PROCEDURE — 0001A COVID-19 PFIZER VACC 0.3 ML: CPT

## 2021-11-06 PROCEDURE — 91300 COVID-19 PFIZER VACC 0.3 ML: CPT

## 2021-12-30 ENCOUNTER — TELEPHONE (OUTPATIENT)
Dept: PULMONOLOGY | Facility: CLINIC | Age: 61
End: 2021-12-30

## 2022-01-03 ENCOUNTER — TELEPHONE (OUTPATIENT)
Dept: PULMONOLOGY | Facility: CLINIC | Age: 62
End: 2022-01-03

## 2022-01-03 ENCOUNTER — TELEMEDICINE (OUTPATIENT)
Dept: PULMONOLOGY | Facility: CLINIC | Age: 62
End: 2022-01-03
Payer: COMMERCIAL

## 2022-01-03 DIAGNOSIS — F17.200 TOBACCO DEPENDENCE: Primary | ICD-10-CM

## 2022-01-03 DIAGNOSIS — G47.33 OSA (OBSTRUCTIVE SLEEP APNEA): ICD-10-CM

## 2022-01-03 DIAGNOSIS — R91.8 MULTIPLE PULMONARY NODULES: ICD-10-CM

## 2022-01-03 PROCEDURE — 99212 OFFICE O/P EST SF 10 MIN: CPT | Performed by: INTERNAL MEDICINE

## 2022-01-03 NOTE — ASSESSMENT & PLAN NOTE
On CPAP therapy for 15 years  Continues on 12 cm water pressure  Fortunately his CPAP machine has not been recalled by the   Continues to be asymptomatic as long as he uses his CPAP  Compliance not available  I will research this  Equipment renewed  We will discuss this again in 1 year

## 2022-01-03 NOTE — PROGRESS NOTES
Virtual Regular Visit    Verification of patient location:    Patient is located in the following state in which I hold an active license [place]      Assessment/Plan:    Problem List Items Addressed This Visit        Respiratory    AZAR (obstructive sleep apnea)     On CPAP therapy for 15 years  Continues on 12 cm water pressure  Fortunately his CPAP machine has not been recalled by the   Continues to be asymptomatic as long as he uses his CPAP  Compliance not available  I will research this  Equipment renewed  We will discuss this again in 1 year  Other    Multiple pulmonary nodules     Multiple nodules last reviewed in 2019  Recent lung cancer screening indicates that nodules have decreased in size indicating their benignity  Repeat lung cancer screening in a year as patient continues to smoke  Tobacco dependence - Primary     Discussed continued tobacco use, risk for lung cancer and increased risk for vascular disease in combination with diabetes  Patient desires to stop smoking but Chantix is not available which worked for him before  Given advice about FDA approved electronic cigarettes (Vuse Solo) which he might try                      Reason for visit is   Chief Complaint   Patient presents with    Follow-up    Sleep Apnea        Encounter provider Kristina Steward MD    Provider located at [address] [place] [address] [id number] [phone]      Recent Visits  Date Type Provider Dept   12/30/21 Telephone [phone] recent visits within past 7 days and meeting all other requirements  Today's Visits  Date Type Provider Dept   01/03/22 [facility]medicine [facility]   Showing today's visits and meeting all other requirements  Future Appointments  No visits were found meeting these conditions  Showing future appointments within next 150 days and meeting all other requirements       The patient was identified by name and date of birth  Leia Quintana was informed that this is a telemedicine visit and that the visit is being conducted through [facility] [employer] and patient was informed this is a secure, HIPAA-complaint platform  He agrees to proceed     My office door was closed  No one else was in the room  He acknowledged consent and understanding of privacy and security of the video platform  The patient has agreed to participate and understands they can discontinue the visit at any time  Patient is aware this is a billable service  Subjective  Leia Quintana is a 59 y o  male    Patient interviewed regarding obstructive sleep apnea  He needs renewal on his equipment  Last contact with the Pulmonary office was in October 2019  Has been on CPAP approximately 15 years  Uses this nightly  Remains asymptomatic with regard to Sleep Disorders while using CPAP  No technical problems with the use of this device  His machine is manufactured by [employer] which has not been recalled  Message sent to [address] to renew equipment  Patient advised that recent lung cancer screening showed reduced nodules compared to previous study in 2019  Yearly screening still recommended based on current tobacco use  Discussed tobacco cessation  Patient would like to stop smoking and did previously stop using Chantix  Advised about FDA approved electronic cigarettes to help with smoking cessation  10 minute video visit         Past Medical History:   Diagnosis Date    CPAP (continuous positive airway pressure) dependence     Diabetes mellitus (HCC)     GERD (gastroesophageal reflux disease)     Hyperlipidemia     Hypertension     Septic arthritis of knee, right ([facility] [address] ) 5/5/2021    Sleep apnea     Squamous cell carcinoma of leg, right        Past Surgical History:   Procedure Laterality Date    CHOLECYSTECTOMY      COLONOSCOPY      HAND TENDON SURGERY      KNEE ARTHROSCOPY      Knee, right    SPLENECTOMY, PARTIAL  1968    accident    TONSILLECTOMY      WOUND DEBRIDEMENT Right 5/5/2021    Procedure: RIGHT KNEE ARTHROSCOPY W/IRRIGATION AND DEBRIDEMENT OF SEPTIC ARTHRITIS;  Surgeon: James Sood MD;  Location: [facility];  Service: Orthopedics       Current Outpatient Medications   Medication Sig Dispense Refill    amLODIPine (NORVASC) 10 mg tablet Take 1 tablet (10 mg total) by mouth daily 90 tablet 2    atorvastatin (LIPITOR) 20 mg tablet Take 1 tablet (20 mg total) by mouth daily 90 tablet 4    insulin glargine (Lantus SoloStar) 100 units/mL injection pen Inject 10 Units under the skin daily        Jardiance 25 MG TABS Take 1 tablet (25 mg total) by mouth daily 90 tablet 1    lisinopril (ZESTRIL) 40 mg tablet daily Taking 1/2 tablet daily (20mg)      metFORMIN (GLUCOPHAGE) 1000 MG tablet Take 1 tablet (1,000 mg total) by mouth 2 (two) times a day 180 tablet 2    metoprolol tartrate (LOPRESSOR) 50 mg tablet Take 1 tablet (50 mg total) by mouth daily 90 tablet 3    glucose blood test strip Use 1 each daily TEST DAILY   PATIENT HAS ONE TOUCH VERIO DEVICE 100 strip 5     No current facility-administered medications for this visit  Allergies   Allergen Reactions    Amoxicillin Rash    Amoxicillin-Pot Clavulanate Rash       Review of Systems   Constitutional: Negative for activity change  Respiratory: Positive for apnea  Negative for cough, shortness of breath and wheezing  Cardiovascular: Negative for chest pain  Genitourinary: Negative for enuresis  Neurological: Negative for headaches  Video Exam    There were no vitals filed for this visit  Physical Exam  Constitutional:       Appearance: Normal appearance  Neurological:      Mental Status: He is alert and oriented to person, place, and time     Psychiatric:         Mood and Affect: Mood normal          Behavior: Behavior normal           I spent Ten minutes with patient today in which greater than 50% of the time was spent in counseling/coordination of care regarding Obstructive sleep apnea and tobacco use    VIRTUAL VISIT DISCLAIMER      Carol May verbally agrees to participate in [employer]  Pt is aware that [employer] could be limited without vital signs or the ability to perform a full hands-on physical Jose Barfield understands he or the provider may request at any time to terminate the video visit and request the patient to seek care or treatment in person

## 2022-01-03 NOTE — ASSESSMENT & PLAN NOTE
Discussed continued tobacco use, risk for lung cancer and increased risk for vascular disease in combination with diabetes  Patient desires to stop smoking but Chantix is not available which worked for him before  Given advice about FDA approved electronic cigarettes (Vuse Solo) which he might try

## 2022-01-03 NOTE — ASSESSMENT & PLAN NOTE
Multiple nodules last reviewed in 2019  Recent lung cancer screening indicates that nodules have decreased in size indicating their benignity  Repeat lung cancer screening in a year as patient continues to smoke

## 2022-01-17 DIAGNOSIS — E11.9 TYPE 2 DIABETES MELLITUS WITHOUT COMPLICATION, WITH LONG-TERM CURRENT USE OF INSULIN (HCC): ICD-10-CM

## 2022-01-17 DIAGNOSIS — Z79.4 TYPE 2 DIABETES MELLITUS WITHOUT COMPLICATION, WITH LONG-TERM CURRENT USE OF INSULIN (HCC): ICD-10-CM

## 2022-01-17 RX ORDER — EMPAGLIFLOZIN 25 MG/1
25 TABLET, FILM COATED ORAL DAILY
Qty: 90 TABLET | Refills: 1 | Status: SHIPPED | OUTPATIENT
Start: 2022-01-17 | End: 2022-07-08 | Stop reason: SDUPTHER

## 2022-01-26 ENCOUNTER — PROCEDURE VISIT (OUTPATIENT)
Dept: SURGERY | Facility: CLINIC | Age: 62
End: 2022-01-26
Payer: COMMERCIAL

## 2022-01-26 VITALS
HEART RATE: 89 BPM | SYSTOLIC BLOOD PRESSURE: 142 MMHG | TEMPERATURE: 97.7 F | RESPIRATION RATE: 18 BRPM | WEIGHT: 195 LBS | BODY MASS INDEX: 28.88 KG/M2 | DIASTOLIC BLOOD PRESSURE: 80 MMHG | HEIGHT: 69 IN

## 2022-01-26 DIAGNOSIS — H61.91 SKIN LESION OF RIGHT EXTERNAL EAR: Primary | ICD-10-CM

## 2022-01-26 PROCEDURE — 3077F SYST BP >= 140 MM HG: CPT | Performed by: SURGERY

## 2022-01-26 PROCEDURE — 3079F DIAST BP 80-89 MM HG: CPT | Performed by: SURGERY

## 2022-01-26 PROCEDURE — 3008F BODY MASS INDEX DOCD: CPT | Performed by: SURGERY

## 2022-01-26 PROCEDURE — 99203 OFFICE O/P NEW LOW 30 MIN: CPT | Performed by: SURGERY

## 2022-01-26 NOTE — PROGRESS NOTES
Assessment/Plan:  He has a recurrent skin lesion of his right year low  I told him that we will excise it under local anesthetic  The he signed the consent  As per his request we are scheduling him for February 10, 2022 at [facility]  He will be the 1st case again as per his request     No problem-specific Assessment & Plan notes found for this encounter  Diagnoses and all orders for this visit:    Skin lesion of right external ear          Subjective:      Patient ID: Connie Markham is a 59 y o  male  64-year-old male patient came to my office with complaints of a skin lesion over his right ear low  He says he had it shaved by his primary care physician last year but it rapidly grew back  He also had nitro freeze done but then again it rapidly grew back to current size  The pathology result from his last excision was benign  He has no problem with ulceration or drainage from the lesion  The following portions of the patient's history were reviewed and updated as appropriate:   He  has a past medical history of CPAP (continuous positive airway pressure) dependence, Diabetes mellitus ([facility] [address] ), GERD (gastroesophageal reflux disease), Hyperlipidemia, Hypertension, Septic arthritis of knee, right ([address] ) (5/5/2021), Sleep apnea, and Squamous cell carcinoma of leg, right    He   Patient Active Problem List    Diagnosis Date Noted    Left ankle swelling 09/29/2021    AZAR (obstructive sleep apnea) 05/08/2021    Hyperlipidemia 05/05/2021    Multiple pulmonary nodules 01/13/2021    Granulomatosis with polyangiitis ([facility] [address] ) 01/13/2021    Type 2 diabetes mellitus, with long-term current use of insulin ([facility] [address] ) 01/13/2021    Essential hypertension 01/13/2021    Overweight with body mass index (BMI) of 29 to 29 9 in adult 01/13/2021    Tobacco dependence 01/13/2021    History of partial splenectomy 01/13/2021    Recurrent boils 01/13/2021     He  has a past surgical history that includes Splenectomy, partial (1968); Tonsillectomy; Hand tendon surgery; Cholecystectomy; Wound debridement (Right, 5/5/2021); Knee arthroscopy; and Colonoscopy  His family history includes Coronary artery disease in his father; Diabetes in his father; Kidney disease in his mother; Ovarian cancer in his mother; Pancreatic cancer in his brother  He  reports that he has been smoking cigarettes  He has a 48 00 pack-year smoking history  He has never used smokeless tobacco  He reports current alcohol use of about 3 0 standard drinks of alcohol per week  He reports that he does not use drugs  Current Outpatient Medications   Medication Sig Dispense Refill    amLODIPine (NORVASC) 10 mg tablet Take 1 tablet (10 mg total) by mouth daily 90 tablet 2    atorvastatin (LIPITOR) 20 mg tablet Take 1 tablet (20 mg total) by mouth daily 90 tablet 4    glucose blood test strip Use 1 each daily TEST DAILY   PATIENT HAS ONE TOUCH VERIO DEVICE 100 strip 5    insulin glargine (Lantus SoloStar) 100 units/mL injection pen Inject 10 Units under the skin daily        Jardiance 25 MG TABS Take 1 tablet (25 mg total) by mouth daily 90 tablet 1    lisinopril (ZESTRIL) 40 mg tablet daily Taking 1/2 tablet daily (20mg)      metFORMIN (GLUCOPHAGE) 1000 MG tablet Take 1 tablet (1,000 mg total) by mouth 2 (two) times a day 180 tablet 2    metoprolol tartrate (LOPRESSOR) 50 mg tablet Take 1 tablet (50 mg total) by mouth daily 90 tablet 3     No current facility-administered medications for this visit  Current Outpatient Medications on File Prior to Visit   Medication Sig    amLODIPine (NORVASC) 10 mg tablet Take 1 tablet (10 mg total) by mouth daily    atorvastatin (LIPITOR) 20 mg tablet Take 1 tablet (20 mg total) by mouth daily    glucose blood test strip Use 1 each daily TEST DAILY    PATIENT HAS ONE TOUCH VERIO DEVICE    insulin glargine (Lantus SoloStar) 100 units/mL injection pen Inject 10 Units under the skin daily      Jardiance 25 MG TABS Take 1 tablet (25 mg total) by mouth daily    lisinopril (ZESTRIL) 40 mg tablet daily Taking 1/2 tablet daily (20mg)    metFORMIN (GLUCOPHAGE) 1000 MG tablet Take 1 tablet (1,000 mg total) by mouth 2 (two) times a day    metoprolol tartrate (LOPRESSOR) 50 mg tablet Take 1 tablet (50 mg total) by mouth daily     No current facility-administered medications on file prior to visit  He is allergic to amoxicillin and amoxicillin-pot clavulanate       Review of Systems   Constitutional: Negative  HENT: Negative  Eyes: Negative  Respiratory: Negative  Cardiovascular: Negative  Gastrointestinal: Negative  Endocrine: Negative  Genitourinary: Negative  Musculoskeletal: Negative  Skin: Negative  Allergic/Immunologic: Negative  Neurological: Negative  Hematological: Negative  Psychiatric/Behavioral: Negative  Objective:      /80 (BP Location: Right arm, Patient Position: Sitting, Cuff Size: Adult)   Pulse 89   Temp 97 7 °F (36 5 °C)   Resp 18   Ht 5' 9" (1 753 m)   Wt 88 5 kg (195 lb)   BMI 28 80 kg/m²          Physical Exam  Vitals reviewed  Constitutional:       Appearance: Normal appearance  HENT:      Head: Normocephalic and atraumatic  Ears:      Comments: He has a 1 5 cm protruding lesion over his right external ear lobe  It is nontender  There is no ulceration  Nose: Nose normal       Mouth/Throat:      Mouth: Mucous membranes are moist    Eyes:      Pupils: Pupils are equal, round, and reactive to light  Cardiovascular:      Rate and Rhythm: Normal rate and regular rhythm  Pulses: Normal pulses  Heart sounds: Normal heart sounds  Musculoskeletal:      Cervical back: Normal range of motion  Skin:     General: Skin is warm  Neurological:      General: No focal deficit present  Mental Status: He is alert     Psychiatric:         Mood and Affect: Mood normal

## 2022-01-26 NOTE — H&P (VIEW-ONLY)
Assessment/Plan:  He has a recurrent skin lesion of his right year low  I told him that we will excise it under local anesthetic  The he signed the consent  As per his request we are scheduling him for February 10, 2022 at [facility]  He will be the 1st case again as per his request     No problem-specific Assessment & Plan notes found for this encounter  Diagnoses and all orders for this visit:    Skin lesion of right external ear          Subjective:      Patient ID: Nhan Molina is a 59 y o  male  68-year-old male patient came to my office with complaints of a skin lesion over his right ear low  He says he had it shaved by his primary care physician last year but it rapidly grew back  He also had nitro freeze done but then again it rapidly grew back to current size  The pathology result from his last excision was benign  He has no problem with ulceration or drainage from the lesion  The following portions of the patient's history were reviewed and updated as appropriate:   He  has a past medical history of CPAP (continuous positive airway pressure) dependence, Diabetes mellitus ([facility] [address] ), GERD (gastroesophageal reflux disease), Hyperlipidemia, Hypertension, Septic arthritis of knee, right ([address] ) (5/5/2021), Sleep apnea, and Squamous cell carcinoma of leg, right    He   Patient Active Problem List    Diagnosis Date Noted    Left ankle swelling 09/29/2021    AZAR (obstructive sleep apnea) 05/08/2021    Hyperlipidemia 05/05/2021    Multiple pulmonary nodules 01/13/2021    Granulomatosis with polyangiitis ([facility] [address] ) 01/13/2021    Type 2 diabetes mellitus, with long-term current use of insulin ([facility] [address] ) 01/13/2021    Essential hypertension 01/13/2021    Overweight with body mass index (BMI) of 29 to 29 9 in adult 01/13/2021    Tobacco dependence 01/13/2021    History of partial splenectomy 01/13/2021    Recurrent boils 01/13/2021     He  has a past surgical history that includes Splenectomy, partial (1968); Tonsillectomy; Hand tendon surgery; Cholecystectomy; Wound debridement (Right, 5/5/2021); Knee arthroscopy; and Colonoscopy  His family history includes Coronary artery disease in his father; Diabetes in his father; Kidney disease in his mother; Ovarian cancer in his mother; Pancreatic cancer in his brother  He  reports that he has been smoking cigarettes  He has a 48 00 pack-year smoking history  He has never used smokeless tobacco  He reports current alcohol use of about 3 0 standard drinks of alcohol per week  He reports that he does not use drugs  Current Outpatient Medications   Medication Sig Dispense Refill    amLODIPine (NORVASC) 10 mg tablet Take 1 tablet (10 mg total) by mouth daily 90 tablet 2    atorvastatin (LIPITOR) 20 mg tablet Take 1 tablet (20 mg total) by mouth daily 90 tablet 4    glucose blood test strip Use 1 each daily TEST DAILY   PATIENT HAS ONE TOUCH VERIO DEVICE 100 strip 5    insulin glargine (Lantus SoloStar) 100 units/mL injection pen Inject 10 Units under the skin daily        Jardiance 25 MG TABS Take 1 tablet (25 mg total) by mouth daily 90 tablet 1    lisinopril (ZESTRIL) 40 mg tablet daily Taking 1/2 tablet daily (20mg)      metFORMIN (GLUCOPHAGE) 1000 MG tablet Take 1 tablet (1,000 mg total) by mouth 2 (two) times a day 180 tablet 2    metoprolol tartrate (LOPRESSOR) 50 mg tablet Take 1 tablet (50 mg total) by mouth daily 90 tablet 3     No current facility-administered medications for this visit  Current Outpatient Medications on File Prior to Visit   Medication Sig    amLODIPine (NORVASC) 10 mg tablet Take 1 tablet (10 mg total) by mouth daily    atorvastatin (LIPITOR) 20 mg tablet Take 1 tablet (20 mg total) by mouth daily    glucose blood test strip Use 1 each daily TEST DAILY    PATIENT HAS ONE TOUCH VERIO DEVICE    insulin glargine (Lantus SoloStar) 100 units/mL injection pen Inject 10 Units under the skin daily      Jardiance 25 MG TABS Take 1 tablet (25 mg total) by mouth daily    lisinopril (ZESTRIL) 40 mg tablet daily Taking 1/2 tablet daily (20mg)    metFORMIN (GLUCOPHAGE) 1000 MG tablet Take 1 tablet (1,000 mg total) by mouth 2 (two) times a day    metoprolol tartrate (LOPRESSOR) 50 mg tablet Take 1 tablet (50 mg total) by mouth daily     No current facility-administered medications on file prior to visit  He is allergic to amoxicillin and amoxicillin-pot clavulanate       Review of Systems   Constitutional: Negative  HENT: Negative  Eyes: Negative  Respiratory: Negative  Cardiovascular: Negative  Gastrointestinal: Negative  Endocrine: Negative  Genitourinary: Negative  Musculoskeletal: Negative  Skin: Negative  Allergic/Immunologic: Negative  Neurological: Negative  Hematological: Negative  Psychiatric/Behavioral: Negative  Objective:      /80 (BP Location: Right arm, Patient Position: Sitting, Cuff Size: Adult)   Pulse 89   Temp 97 7 °F (36 5 °C)   Resp 18   Ht 5' 9" (1 753 m)   Wt 88 5 kg (195 lb)   BMI 28 80 kg/m²          Physical Exam  Vitals reviewed  Constitutional:       Appearance: Normal appearance  HENT:      Head: Normocephalic and atraumatic  Ears:      Comments: He has a 1 5 cm protruding lesion over his right external ear lobe  It is nontender  There is no ulceration  Nose: Nose normal       Mouth/Throat:      Mouth: Mucous membranes are moist    Eyes:      Pupils: Pupils are equal, round, and reactive to light  Cardiovascular:      Rate and Rhythm: Normal rate and regular rhythm  Pulses: Normal pulses  Heart sounds: Normal heart sounds  Musculoskeletal:      Cervical back: Normal range of motion  Skin:     General: Skin is warm  Neurological:      General: No focal deficit present  Mental Status: He is alert     Psychiatric:         Mood and Affect: Mood normal

## 2022-02-07 NOTE — PRE-PROCEDURE INSTRUCTIONS
Pre-Surgery Instructions:   Medication Instructions    amLODIPine (NORVASC) 10 mg tablet Instructed patient per Anesthesia Guidelines   atorvastatin (LIPITOR) 20 mg tablet Instructed patient per Anesthesia Guidelines   insulin glargine (Lantus SoloStar) 100 units/mL injection pen Instructed patient per Anesthesia Guidelines   Jardiance 25 MG TABS Instructed patient per Anesthesia Guidelines   lisinopril (ZESTRIL) 40 mg tablet Instructed patient per Anesthesia Guidelines   metFORMIN (GLUCOPHAGE) 1000 MG tablet Instructed patient per Anesthesia Guidelines   metoprolol tartrate (LOPRESSOR) 50 mg tablet Instructed patient per Anesthesia Guidelines        Patient may take: Lopressor, Glucophage, lisinopril, Jardiance, Lipitor, Norvasc morning of procedure with light breakfast, he will be done under local anesthetic

## 2022-02-10 ENCOUNTER — HOSPITAL ENCOUNTER (OUTPATIENT)
Facility: HOSPITAL | Age: 62
Setting detail: OUTPATIENT SURGERY
Discharge: HOME | End: 2022-02-10
Attending: SURGERY | Admitting: SURGERY
Payer: COMMERCIAL

## 2022-02-10 VITALS
BODY MASS INDEX: 28.29 KG/M2 | OXYGEN SATURATION: 96 % | HEIGHT: 69 IN | TEMPERATURE: 97.7 F | WEIGHT: 191 LBS | DIASTOLIC BLOOD PRESSURE: 71 MMHG | RESPIRATION RATE: 18 BRPM | SYSTOLIC BLOOD PRESSURE: 119 MMHG | HEART RATE: 76 BPM

## 2022-02-10 DIAGNOSIS — H61.91 LESION OF EXTERNAL EAR, RIGHT: ICD-10-CM

## 2022-02-10 LAB — GLUCOSE SERPL-MCNC: 110 MG/DL (ref 65–140)

## 2022-02-10 PROCEDURE — 11441 EXC FACE-MM B9+MARG 0.6-1 CM: CPT | Performed by: PHYSICIAN ASSISTANT

## 2022-02-10 PROCEDURE — 88305 TISSUE EXAM BY PATHOLOGIST: CPT | Performed by: PATHOLOGY

## 2022-02-10 PROCEDURE — 82948 REAGENT STRIP/BLOOD GLUCOSE: CPT

## 2022-02-10 PROCEDURE — 11441 EXC FACE-MM B9+MARG 0.6-1 CM: CPT | Performed by: SURGERY

## 2022-02-10 RX ORDER — LIDOCAINE HYDROCHLORIDE 10 MG/ML
INJECTION, SOLUTION EPIDURAL; INFILTRATION; INTRACAUDAL; PERINEURAL AS NEEDED
Status: DISCONTINUED | OUTPATIENT
Start: 2022-02-10 | End: 2022-02-10 | Stop reason: HOSPADM

## 2022-02-10 RX ORDER — MAGNESIUM HYDROXIDE 1200 MG/15ML
LIQUID ORAL AS NEEDED
Status: DISCONTINUED | OUTPATIENT
Start: 2022-02-10 | End: 2022-02-10 | Stop reason: HOSPADM

## 2022-02-10 NOTE — OP NOTE
PERATIVE REPORT  PATIENT NAME: Jeremy Fierro    :  1960  MRN: [id number]  Pt Location: [facility] OR ROOM 02    SURGERY DATE: 2/10/2022    Surgeon(s) and Role: * Precious Dunham MD - Primary     * Mayelin Hernandez PA-C - Assisting    Preop Diagnosis:  Lesion of external ear, right [H61 91]    Post-Op Diagnosis Codes:     * Lesion of external ear, right [H61 91]    Procedure(s) (LRB):  EXCISION LESION RIGHT EAR LOBE (Right)    Specimen(s):  ID Type Source Tests Collected by Time Destination   1 : SKIN LESION RIGHT EAR LOBE Tissue Lesion TISSUE EXAM Precious Dunham MD 2/10/2022 [id number]        Estimated Blood Loss:   Minimal    Drains:  * No LDAs found *    Anesthesia Type:   Local    Operative Indications:  Lesion of external ear, right [H61 91]      Operative Findings:  1 cm lesion on the skin of the right earlobe  Complications:   None    Procedure and Technique:  The patient was brought to the operating room and identified correctly by myself and the operating room staff  A time-out was performed  Parts were prepped and draped in standard fashion  1% lidocaine without epinephrine was given a local anesthetic  The area of excision was marked and the lesion was excised with 5 mm of margin in the surrounding region  Hemostasis was achieved with electrocautery and Surgicel  The incision was closed using 4-0 Prolene in an interrupted vertical mattress fashion  Neosporin was applied  Patient was transferred to the recovery under stable condition     I was present for the entire procedure, A qualified resident physician was not available and A physician assistant was required during the procedure for retraction tissue handling,dissection and suturing    Patient Disposition:  PACU       SIGNATURE: Precious Dunham MD  DATE: February 10, 2022  TIME: 11:04 AM

## 2022-02-10 NOTE — DISCHARGE INSTRUCTIONS
Discharge Instructions:  Home Care for Minor Skin Lesion Excisions     -Take over the counter Tylenol as needed for mild to moderate pain    -You have 3 blue skin sutures that will need to be removed at follow up visit  No overlying dressing required  -Apply ice to the incision site as needed to decrease discomfort/swelling  Place on for 15 minutes at a time  Do not place directly on the skin    -You may shower tomorrow  Let water run over the incision and pat dry  Do not scrub or apply ointments/creams/lotions    -You can apply bacitracin or neosporin antibiotic ointment daily    -Avoid submersion in water for long periods of time (baths, pools, hot tubs, etc ) until cleared by your doctor at follow up visit  Showering is fine    -Always wash your hands if before and after touching the incision site  Avoid doing things that will get dirt/sweat on the incision    -Do not pick at scabs, this is protecting the wound and can result in a worsened scar    -It is normal to have minor bruising around the area  -Regular activity is fine  Avoid strenuous exercises that involved the area of the incision for a few days so the incision has time to properly heal    -Please call the surgery office to schedule a follow up appointment with Dr Da Zavala in approximately 2 weeks    -Call the office sooner if you start to develop any of the following signs and symptoms: uncontrolled bleeding, pain uncontrolled with analgesics, fevers/chills >100 4F, redness, warmth, swelling/firmness, tenderness, copious drainage, green/yellow thick drainage, nausea/vomiting, removal of the stiches/opening of the incision  If there is any oozing/bleeding at the suture line apply surgicel and sterile guaze to the area, hold pressure for 10 full minutes

## 2022-02-10 NOTE — INTERVAL H&P NOTE
H&P reviewed  After examining the patient I find no changes in the patients condition since the H&P had been written      Vitals:    02/10/22 0655   BP: 140/76   Pulse: 84   Resp: 18   Temp: (!) 97 4 °F (36 3 °C)   SpO2: 98%

## 2022-02-11 ENCOUNTER — TELEPHONE (OUTPATIENT)
Dept: SURGERY | Facility: CLINIC | Age: 62
End: 2022-02-11

## 2022-02-11 NOTE — TELEPHONE ENCOUNTER
2/11/2022 - Pt had surgery on his R ear on 2/10/22  Pt said Dr Anselmo Valdovinos put in a little piece of gauze/material to stop some oozing  Pt wants to know if he can peel it off or remove it, or should he wait until it comes off itself  Please advise the pt today so he knows what to do before the weekend

## 2022-02-11 NOTE — TELEPHONE ENCOUNTER
Spoke to patient, and informed him he can take off the dressing, and to put some neosporin on it afterwards, per Dr Adam Mcmanus   Patient verbalized understanding

## 2022-02-17 ENCOUNTER — OFFICE VISIT (OUTPATIENT)
Dept: SURGERY | Facility: CLINIC | Age: 62
End: 2022-02-17

## 2022-02-17 VITALS
HEART RATE: 85 BPM | SYSTOLIC BLOOD PRESSURE: 140 MMHG | BODY MASS INDEX: 28.73 KG/M2 | WEIGHT: 194 LBS | RESPIRATION RATE: 18 BRPM | TEMPERATURE: 98.4 F | DIASTOLIC BLOOD PRESSURE: 90 MMHG | OXYGEN SATURATION: 98 % | HEIGHT: 69 IN

## 2022-02-17 DIAGNOSIS — H61.91 SKIN LESION OF RIGHT EXTERNAL EAR: Primary | ICD-10-CM

## 2022-02-17 PROCEDURE — 3008F BODY MASS INDEX DOCD: CPT | Performed by: SURGERY

## 2022-02-17 PROCEDURE — 99024 POSTOP FOLLOW-UP VISIT: CPT | Performed by: SURGERY

## 2022-02-17 NOTE — PATIENT INSTRUCTIONS
Patient is discharged from care as of today    I told him that if this bleeds when he gets home to simply keep some ice on

## 2022-02-17 NOTE — PROGRESS NOTES
First postop after removal of a lesion from his right ear  Pathology turned out to be an irritated wart  On examination he has a couple Prolene sutures in which are a little hard to get out  At this is done but he bled and I had to hold it for about 2 minutes to get the bleeding to stop  It did    I will see him as needed

## 2022-04-01 DIAGNOSIS — E78.5 HYPERLIPIDEMIA, UNSPECIFIED HYPERLIPIDEMIA TYPE: ICD-10-CM

## 2022-04-01 DIAGNOSIS — E11.9 TYPE 2 DIABETES MELLITUS WITHOUT COMPLICATION, WITH LONG-TERM CURRENT USE OF INSULIN (HCC): ICD-10-CM

## 2022-04-01 DIAGNOSIS — Z79.4 TYPE 2 DIABETES MELLITUS WITHOUT COMPLICATION, WITH LONG-TERM CURRENT USE OF INSULIN (HCC): ICD-10-CM

## 2022-04-01 DIAGNOSIS — I10 ESSENTIAL HYPERTENSION: ICD-10-CM

## 2022-04-01 RX ORDER — AMLODIPINE BESYLATE 10 MG/1
TABLET ORAL
Qty: 90 TABLET | Refills: 2 | OUTPATIENT
Start: 2022-04-01

## 2022-04-01 RX ORDER — ATORVASTATIN CALCIUM 20 MG/1
20 TABLET, FILM COATED ORAL DAILY
Qty: 90 TABLET | Refills: 4 | Status: SHIPPED | OUTPATIENT
Start: 2022-04-01

## 2022-04-01 RX ORDER — AMLODIPINE BESYLATE 10 MG/1
10 TABLET ORAL DAILY
Qty: 90 TABLET | Refills: 2 | Status: SHIPPED | OUTPATIENT
Start: 2022-04-01

## 2022-04-01 RX ORDER — METOPROLOL TARTRATE 50 MG/1
50 TABLET, FILM COATED ORAL DAILY
Qty: 90 TABLET | Refills: 3 | Status: SHIPPED | OUTPATIENT
Start: 2022-04-01 | End: 2022-07-08

## 2022-04-30 ENCOUNTER — HOSPITAL ENCOUNTER (OUTPATIENT)
Dept: RADIOLOGY | Facility: HOSPITAL | Age: 62
Discharge: HOME | End: 2022-04-30
Payer: COMMERCIAL

## 2022-04-30 ENCOUNTER — OFFICE VISIT (OUTPATIENT)
Dept: OBGYN CLINIC | Facility: CLINIC | Age: 62
End: 2022-04-30
Payer: COMMERCIAL

## 2022-04-30 VITALS — HEIGHT: 69 IN | OXYGEN SATURATION: 98 % | HEART RATE: 86 BPM | WEIGHT: 189.6 LBS | BODY MASS INDEX: 28.08 KG/M2

## 2022-04-30 DIAGNOSIS — M19.072 PRIMARY OSTEOARTHRITIS OF LEFT ANKLE: ICD-10-CM

## 2022-04-30 DIAGNOSIS — M25.572 PAIN, JOINT, ANKLE AND FOOT, LEFT: Primary | ICD-10-CM

## 2022-04-30 DIAGNOSIS — M21.41 PES PLANUS OF BOTH FEET: ICD-10-CM

## 2022-04-30 DIAGNOSIS — M25.572 PAIN, JOINT, ANKLE AND FOOT, LEFT: ICD-10-CM

## 2022-04-30 DIAGNOSIS — M21.42 PES PLANUS OF BOTH FEET: ICD-10-CM

## 2022-04-30 PROCEDURE — 73610 X-RAY EXAM OF ANKLE: CPT

## 2022-04-30 PROCEDURE — 99213 OFFICE O/P EST LOW 20 MIN: CPT | Performed by: PHYSICIAN ASSISTANT

## 2022-04-30 NOTE — PROGRESS NOTES
Assessment/Plan   Diagnoses and all orders for this visit:    Pain, joint, ankle and foot, left    Primary osteoarthritis of left ankle    Pes planus of both feet    - MRI - concern for navicular stress fx, talus ocd  - Continue wearing shoes with good arch support  - Follow up with Dr Colette Skinner per pt request          Subjective   Patient ID: Jasper Saenz is a 57 y o  male  Vitals:    04/30/22 0756   Pulse: 86   SpO2: 98%     63yo male comes in for an evaluation of his left ankle  He has had ongoing pain in the ankle for years that was treated by Dr Nan lAonso (ortho)  He had an MRI in 2017  The results are not available today, but the patient describes OA and what sounds like it could be osteochondritis dissecans  His pain was well-controlled with the use of arch supports  Over the last few months, his pain has been increasing despite this  No injury  The pain increases with activity  He requests f/u with Dr Colette Skinner who was recommended to him by a nurse friend        The following portions of the patient's history were reviewed and updated as appropriate: allergies, current medications, past family history, past medical history, past social history, past surgical history and problem list     Review of Systems  Ortho Exam  Past Medical History:   Diagnosis Date    CPAP (continuous positive airway pressure) dependence     Diabetes mellitus ([facility] )     GERD (gastroesophageal reflux disease)     Hyperlipidemia     Hypertension     Septic arthritis of knee, right ([facility] ) 5/5/2021    Sleep apnea     Squamous cell carcinoma of leg, right      Past Surgical History:   Procedure Laterality Date    CHOLECYSTECTOMY      COLONOSCOPY      FACIAL/NECK BIOPSY Right 2/10/2022    Procedure: EXCISION LESION RIGHT EAR LOBE;  Surgeon: Modesto Delacruz MD;  Location:  MAIN OR;  Service: General    HAND TENDON SURGERY      KNEE ARTHROSCOPY      Knee, right    SPLENECTOMY, PARTIAL  1968    accident    TONSILLECTOMY  WOUND DEBRIDEMENT Right 5/5/2021    Procedure: RIGHT KNEE ARTHROSCOPY W/IRRIGATION AND DEBRIDEMENT OF SEPTIC ARTHRITIS;  Surgeon: Haley López MD;  Location: [facility];  Service: Orthopedics     Family History   Problem Relation Age of Onset    Ovarian cancer Mother     Kidney disease Mother     Coronary artery disease Father     Diabetes Father     Pancreatic cancer Brother      Social History     Occupational History    Not on file   Tobacco Use    Smoking status: Current Every Day Smoker     Packs/day: 1 00     Years: 48 00     Pack years: 48 00     Types: Cigarettes    Smokeless tobacco: Never Used   Vaping Use    Vaping Use: Never used   Substance and Sexual Activity    Alcohol use: Yes     Comment: occasional    Drug use: Never    Sexual activity: Yes     Partners: Female     Birth control/protection: None       Review of Systems   Constitutional: Negative  HENT: Negative  Eyes: Negative  Respiratory: Negative  Cardiovascular: Negative  Gastrointestinal: Negative  Endocrine: Negative  Genitourinary: Negative  Musculoskeletal: As below      Allergic/Immunologic: Negative  Neurological: Negative  Hematological: Negative  Psychiatric/Behavioral: Negative  Objective   Physical Exam        I have personally reviewed pertinent films in PACS and my interpretation is pes planus  Midfoot OA         · Constitutional: Awake, Alert, Oriented  · Eyes: EOMI  · Psych: Mood and affect appropriate  · Heart: regular rate   · Lungs: No audible wheezing  · Abdomen: No guarding  · Lymph: no lymphedema             left ankle:  - Appearance   No swelling, discoloration, or ecchymosis   Pes planus  - Palpation   + tenderness navicular and distal post tib tendon  - ROM   Dorsiflexion: 0, Plantarflexion: 30, Inversion: 20 and Eversion: 10  - Special Tests   Normal Tan test  - Motor   normal 5/5 in all planes  - NVI distally

## 2022-05-03 LAB
LEFT EYE DIABETIC RETINOPATHY: NORMAL
RIGHT EYE DIABETIC RETINOPATHY: NORMAL

## 2022-05-26 ENCOUNTER — HOSPITAL ENCOUNTER (OUTPATIENT)
Dept: MRI IMAGING | Facility: HOSPITAL | Age: 62
Discharge: HOME | End: 2022-05-26
Payer: COMMERCIAL

## 2022-05-26 DIAGNOSIS — M19.072 PRIMARY OSTEOARTHRITIS OF LEFT ANKLE: ICD-10-CM

## 2022-05-26 DIAGNOSIS — M25.572 PAIN, JOINT, ANKLE AND FOOT, LEFT: ICD-10-CM

## 2022-05-26 DIAGNOSIS — M21.42 PES PLANUS OF BOTH FEET: ICD-10-CM

## 2022-05-26 DIAGNOSIS — M21.41 PES PLANUS OF BOTH FEET: ICD-10-CM

## 2022-05-26 PROCEDURE — 73718 MRI LOWER EXTREMITY W/O DYE: CPT

## 2022-05-27 ENCOUNTER — TELEPHONE (OUTPATIENT)
Dept: FAMILY MEDICINE CLINIC | Facility: CLINIC | Age: 62
End: 2022-05-27

## 2022-05-27 ENCOUNTER — HOSPITAL ENCOUNTER (OUTPATIENT)
Dept: MRI IMAGING | Facility: HOSPITAL | Age: 62
Discharge: HOME | End: 2022-05-27

## 2022-05-27 DIAGNOSIS — Z11.59 NEED FOR HEPATITIS C SCREENING TEST: Primary | ICD-10-CM

## 2022-05-27 DIAGNOSIS — M25.572 PAIN, JOINT, ANKLE AND FOOT, LEFT: ICD-10-CM

## 2022-05-27 DIAGNOSIS — M21.42 PES PLANUS OF BOTH FEET: ICD-10-CM

## 2022-05-27 DIAGNOSIS — M21.41 PES PLANUS OF BOTH FEET: ICD-10-CM

## 2022-05-27 DIAGNOSIS — E11.9 TYPE 2 DIABETES MELLITUS WITHOUT COMPLICATION, WITH LONG-TERM CURRENT USE OF INSULIN (HCC): ICD-10-CM

## 2022-05-27 DIAGNOSIS — Z79.4 TYPE 2 DIABETES MELLITUS WITHOUT COMPLICATION, WITH LONG-TERM CURRENT USE OF INSULIN (HCC): ICD-10-CM

## 2022-05-27 DIAGNOSIS — M19.072 PRIMARY OSTEOARTHRITIS OF LEFT ANKLE: ICD-10-CM

## 2022-06-01 ENCOUNTER — OFFICE VISIT (OUTPATIENT)
Dept: OBGYN CLINIC | Facility: CLINIC | Age: 62
End: 2022-06-01
Payer: COMMERCIAL

## 2022-06-01 VITALS — BODY MASS INDEX: 27.99 KG/M2 | WEIGHT: 189 LBS | HEIGHT: 69 IN

## 2022-06-01 DIAGNOSIS — M76.829 PTTD (POSTERIOR TIBIAL TENDON DYSFUNCTION): Primary | ICD-10-CM

## 2022-06-01 PROCEDURE — 99214 OFFICE O/P EST MOD 30 MIN: CPT | Performed by: ORTHOPAEDIC SURGERY

## 2022-06-01 PROCEDURE — 4004F PT TOBACCO SCREEN RCVD TLK: CPT | Performed by: ORTHOPAEDIC SURGERY

## 2022-06-01 NOTE — PROGRESS NOTES
CORRIE London  Attending, Orthopaedic Surgery  [facility] [address] [facility]      ORTHOPAEDIC FOOT AND ANKLE CLINIC VISIT     Assessment:     Encounter Diagnosis   Name Primary?  PTTD (posterior tibial tendon dysfunction) Yes            Plan:   · The patient verbalized understanding of exam findings and treatment plan  We engaged in the shared decision-making process and treatment options were discussed at length with the patient  Surgical and conservative management discussed today along with risks and benefits  · Bob Benjamin has stage 2B PTTD with a painful single leg heel raise  · He has not used custom orthotics, only OTC inserts  · At this point he may benefit from an [place] brace, we ordered this for him today  · He will get the brace, wean into the brace and follow up in 3 months to assess response  Return in about 3 months (around 9/1/2022)  History of Present Illness:   Chief Complaint:   Chief Complaint   Patient presents with   [address] [place]     Magdy Pierce is a 57 y o  male who is being seen for left ankle pain  He reports pain for 10-12 years over the medial ankle with walking and weightbearing  He has used otc inserts and supportive foot  Pain is localized at medial ankle/posterior tibialis tendons with occasional lateral ankle pain  He is diabetic and sees a podiatrist who recommended orthotics but he has not gotten them  Patient denies numbness, tingling or radicular pain  Denies history of neuropathy  Patient does smoke, does  have diabetes A1C in 2021 was 5 2 and does not take blood thinners  Patient denies family history of anesthesia complications and has not had any complications with anesthesia       Pain/symptom timing:  Worse during the day when active  Pain/symptom context:  Worse with activites and work  Pain/symptom modifying factors:  Rest makes better, activities make worse  Pain/symptom associated signs/symptoms: none    Prior treatment   · NSAIDsYes   · Injections No   · Bracing/Orthotics No    · Physical Therapy No     Orthopedic Surgical History:   See below    Past Medical, Surgical and Social History:  Past Medical History:  has a past medical history of CPAP (continuous positive airway pressure) dependence, Diabetes mellitus ([facility] ), GERD (gastroesophageal reflux disease), Hyperlipidemia, Hypertension, Septic arthritis of knee, right ([facility] [address] ) (5/5/2021), Sleep apnea, and Squamous cell carcinoma of leg, right  Problem List: does not have any pertinent problems on file  Past Surgical History:  has a past surgical history that includes Splenectomy, partial (1968); Tonsillectomy; Hand tendon surgery; Cholecystectomy; Wound debridement (Right, 5/5/2021); Knee arthroscopy; Colonoscopy; and FACIAL/NECK BIOPSY (Right, 2/10/2022)  Family History: family history includes Coronary artery disease in his father; Diabetes in his father; Kidney disease in his mother; Ovarian cancer in his mother; Pancreatic cancer in his brother  Social History:  reports that he has been smoking cigarettes  He has a 48 00 pack-year smoking history  He has never used smokeless tobacco  He reports current alcohol use  He reports that he does not use drugs  Current Medications: has a current medication list which includes the following prescription(s): amlodipine, atorvastatin, glucose blood, lantus solostar, jardiance, lisinopril, metformin, and metoprolol tartrate  Allergies: is allergic to amoxicillin and amoxicillin-pot clavulanate       Review of Systems:  General- denies fever/chills  HEENT- denies hearing loss or sore throat  Eyes- denies eye pain or visual disturbances, denies red eyes  Respiratory- denies cough or SOB  Cardio- denies chest pain or palpitations  GI- denies abdominal pain  Endocrine- denies urinary frequency  Urinary- denies pain with urination  Musculoskeletal- Negative except noted above  Skin- denies rashes or wounds  Neurological- denies dizziness or headache  Psychiatric- denies anxiety or difficulty concentrating    Physical Exam:   Ht 5' 9" (1 753 m)   Wt 85 7 kg (189 lb)   BMI 27 91 kg/m²   General/Constitutional: No apparent distress: well-nourished and well developed  Eyes: normal ocular motion  Cardio: RRR, Normal S1S2, No m/r/g  Lymphatic: No appreciable lymphadenopathy  Respiratory: Non-labored breathing, CTA b/l no w/c/r  Vascular: No edema, swelling or tenderness, except as noted in detailed exam   Integumentary: No impressive skin lesions present, except as noted in detailed exam   Neuro: No ataxia or tremors noted  Psych: Normal mood and affect, oriented to person, place and time  Appropriate affect  Musculoskeletal: Normal, except as noted in detailed exam and in HPI  Examination    Left    Gait Normal   Musculoskeletal Tender to palpation at posterior tib tendon, retromalleolar edema    Skin Normal       Nails Normal    Range of Motion  20 degrees dorsiflexion, 40 degrees plantarflexion  Subtalar motion: normal    Stability Stable    Muscle Strength 5/5 tibialis anterior  5/5 gastrocnemius-soleus  3/5 posterior tibialis  5/5 peroneal/eversion strength  5/5 EHL  5/5 FHL    Neurologic Normal    Sensation Intact to light touch throughout sural, saphenous, superficial peroneal, deep peroneal and medial/lateral plantar nerve distributions  [facility] 5 07 filament (10g) testing was deferred  Cardiovascular Brisk capillary refill < 2 seconds,intact DP and PT pulses    Special Tests Painful single leg heel rise test      Imaging Studies:   3 views of the left ankle were available, reviewed and interpreted independently that demonstrate no acute finding, minimal degenerative changes  Reviewed by me personally  MRI of the left ankle and foot were available, reviewed and interpreted independently that demonstrate tibiotalar arthritis, no navicular stress fracture       Scribe Attestation    I,:  Bryon Salazar PA-C am acting as a scribe while in the presence of the attending physician :       I,:  Stevphen Ganser, MD personally performed the services described in this documentation    as scribed in my presence :             Liam Bridgeman Lachman, MD  Foot & Ankle Surgery   Department of [address] [place] [address]      I personally performed the service  Liam Bridgeman Lachman, MD

## 2022-06-01 NOTE — PATIENT INSTRUCTIONS
Today, We recommended an [address] [place] [address] Street for your left ankle    [employer] certified pedorthotists make orthotics but not braces or prosthesis  Below are the companies in the area that make these, Please call ahead for an appointment and to ensure the company accepts your insurance  Make sure to bring the prescription given to you in the office today to the company for the brace/prosthesis  [place]  [address]  [address] [facility] [address] Phone: [phone]  [place] Fax: [phone]    [address]  [address]  [place], [address]  (By Appointment Only)  Directions  [address]  [place] Phone: [phone]  [place] Fax: [facility] Location  [address]  [address] [facility]  [phone] (fax)    [facility]  [address] [facility], [address] [place] [address],[address]  [place], On license of [facility] [place]  [phone] (fax)    [facility]  [address]  [phone] (fax)    [facility] Location  [address] [address] [address] [place] [address]  [phone] (fax)    [place] [address] , [address], [place]   [phone] (fax)    Flatfoot Surgical Correction     What is adult flatfoot? Adult flatfoot is a condition that causes flattening of the arch of the foot  X-ray views of a flatfoot before and after surgery  This patient had a first tarsal-metatarsal fusion, a medializing calcaneal osteotomy and a lateral column lengthening  What are the goals of flatfoot surgical correction? The goal of surgical correction is to improve alignment of the foot  This allows for more normal pressures during standing and walking   A combination of procedures is performed to repair the ligaments and tendons that support the arch  Bone cuts are often made to help restore the arch  Proper correction of flatfoot deformity can often help to improve pain and walking ability  What signs indicate surgery may be needed? Patients with flatfoot frequently describe ankle pain and difficulty with daily activities  Surgical reconstruction of the flatfoot is performed in patients with an arch collapse that is still flexible (not stiff)  An orthopaedic foot and ankle surgeon should do a complete evaluation of the foot  This includes a medical history, physical exam and  X-rays  A trial of non-operative treatment should be completed prior to any decision to have surgery  Treatments can include rest, immobilization, shoe inserts, braces and physical therapy  If these are unsuccessful, surgery can be considered  When should I avoid surgery? Patients who have diabetes or take oral steroids should be evaluated by their primary care physician  These conditions may prevent you from being able to safely have surgery  Obese patients and smokers are at higher risk for blood clots and wound problems  Full recovery from flatfoot surgery can take up to a year  Patients who are unable or unwilling to complete this process should not have this surgery  General Details of Procedure  A combination of surgical procedures can be used to reconstruct the flatfoot  Generally, these procedures can be  into those that correct deformities of the bones and those that repair ligaments and tendons  Your orthopaedic surgeon will choose the proper combination of procedures for your foot  Surgery of the foot can be performed under regional anesthesia, which is numbing the foot and ankle with a nerve or spinal block, or general anesthesia, which may require a breathing tube  A nerve block is often placed behind the knee to reduce pain after surgery       Specific Techniques  Medializing Calcaneal Osteotomy  A medializing calcaneal osteotomy (heel slide) procedure is often used when the calcaneus (heel bone) has shifted out from underneath the leg  An incision is made on the outside of the heel, and the back half of the heel bone is cut and slid back underneath the leg  The heel is then fixed in place using metal screws or a plate  Lateral Column Lengthening  Outward rotation of the foot may occur in patients with flatfoot  A lateral column lengthening procedure is sometimes performed for these patients  An incision is made on the outside of the foot, and the front half of the heel bone is cut  A bone wedge is then placed into the cut area of the heel bone  This wedge helps to lengthen the heel bone and rotate the foot back into its correct position  The wedge is usually kept in place using screws or a plate  The wedge can be taken from a cadaver or from a patients own hip  Medial Cuneiform Dorsal Opening Wedge Osteotomy or First Tarsal-Metatarsal Fusion  Arch collapse can lead to the big toe side of the foot being raised above the ground  Your surgeon may perform a dorsal       X-ray views of a flatfoot before and after surgery  This patient had a first tarsal-metatarsal fusion, a medializing calcaneal osteotomy and a lateral column lengthening  opening wedge osteotomy of the medial cuneiform bone to treat this problem  An alternative is to perform a first tarsal-metatarsal joint fusion  Both procedures involve an incision over the top of the foot  In the case of the dorsal opening wedge osteotomy, a bone wedge is placed into the top portion of the bone to push it down toward the floor  In the case of the fusion, the bone is pushed down toward the floor at the level of a joint in the middle of the foot and the bones are fused into that position  Screws or a plate can be used to keep the wedge in place or to fuse the joint  Tendon and Ligament Procedures  The posterior tibial tendon runs underneath the arch of the foot   It is often stretched and dysfunctional in patients with flatfoot  The tendon often requires removal if it is thickened or torn  Usually the tendon that bends the little toes can be transferred (rerouted) to help support the arch  The stresses placed on the flatfoot can lead to tearing of the ligaments that support the arch (spring ligament) and the inside of the ankle (deltoid ligament)  Your surgeon may decide to repair these structures if significant damage has been done  Finally, the flatfoot condition is often associated with tightness of the Achilles tendon  This can be treated using a lengthening procedure to stretch the muscle fibers of the calf  Double or Triple Arthrodesis  In the later stages of flatfoot, deformities are frequently inflexible (stiff)  Arthritis of the foot may be present as well  Surgical correction of these severe cases requires fusion of one or more of the foot joints  This procedure is referred to as a double or triple arthrodesis depending on the number of joints fused  For more information, see the Triple Arthrodesis page  What happens after surgery? Patients may go home the day of surgery or they may require an overnight hospital stay  The leg will be placed in a splint or cast and should be kept elevated for the first two weeks  At that point, sutures are removed  A new cast or a removable boot is then placed  It is important that patients do not put any weight on the corrected foot for six to eight weeks following the operation  Patients may begin bearing weight at eight weeks and usually progress to full weightbearing by 10 to 12 weeks  For some patients, weightbearing requires additional time  After 12 weeks, patients commonly can transition to wearing a shoe  Inserts and ankle braces are often used  Physical therapy may be recommended       Potential Complications  There are complications that relate to surgery in general  These include the risks associated with anesthesia, infection, damage to nerves and blood vessels, and bleeding or blood clots  Complications following flatfoot surgery may include wound breakdown or nonunion (incomplete healing of the bones)  These complications often can be prevented with proper wound care and rehabilitation  Occasionally, patients may notice some discomfort due to prominent hardware  Removal of hardware can be done at a later time if this is an issue  The overall complication rates for flatfoot surgery are low  Frequently Asked Questions  Will surgical correction of my flatfoot improve the cosmetic appearance of my foot? Surgical correction of flatfoot is aimed primarily at reducing pain and restoring function  Although surgery will likely improve the cosmetic appearance of the foot, it is not one of the primary goals of treatment  What activities will I be able to do following flatfoot surgery? With proper correction and rehabilitation, many patients return to active lifestyles  Activities such as walking, biking, driving and even golfing are well tolerated  It is less likely, however, that patients will be able to participate in very strenuous activities requiring running, cutting or jumping

## 2022-06-03 ENCOUNTER — APPOINTMENT (OUTPATIENT)
Dept: LAB | Facility: HOSPITAL | Age: 62
End: 2022-06-03
Payer: COMMERCIAL

## 2022-06-03 DIAGNOSIS — Z79.4 TYPE 2 DIABETES MELLITUS WITHOUT COMPLICATION, WITH LONG-TERM CURRENT USE OF INSULIN (HCC): ICD-10-CM

## 2022-06-03 DIAGNOSIS — Z11.59 NEED FOR HEPATITIS C SCREENING TEST: ICD-10-CM

## 2022-06-03 DIAGNOSIS — E11.9 TYPE 2 DIABETES MELLITUS WITHOUT COMPLICATION, WITH LONG-TERM CURRENT USE OF INSULIN (HCC): ICD-10-CM

## 2022-06-03 LAB
ALBUMIN SERPL BCP-MCNC: 4.1 G/DL (ref 3.5–5)
ALP SERPL-CCNC: 71 U/L (ref 34–104)
ALT SERPL W P-5'-P-CCNC: 11 U/L (ref 7–52)
ANION GAP SERPL CALCULATED.3IONS-SCNC: 9 MMOL/L (ref 4–13)
AST SERPL W P-5'-P-CCNC: 12 U/L (ref 13–39)
BILIRUB SERPL-MCNC: 0.88 MG/DL (ref 0.2–1)
BUN SERPL-MCNC: 8 MG/DL (ref 5–25)
CALCIUM SERPL-MCNC: 9.4 MG/DL (ref 8.4–10.2)
CHLORIDE SERPL-SCNC: 103 MMOL/L (ref 96–108)
CHOLEST SERPL-MCNC: 99 MG/DL
CO2 SERPL-SCNC: 29 MMOL/L (ref 21–32)
CREAT SERPL-MCNC: 0.89 MG/DL (ref 0.6–1.3)
CREAT UR-MCNC: 73.4 MG/DL
EST. AVERAGE GLUCOSE BLD GHB EST-MCNC: 134 MG/DL
GFR SERPL CREATININE-BSD FRML MDRD: 90 ML/MIN/1.73SQ M
GLUCOSE P FAST SERPL-MCNC: 95 MG/DL (ref 65–99)
HBA1C MFR BLD: 6.3 %
HCV AB SER QL: NORMAL
HDLC SERPL-MCNC: 49 MG/DL
LDLC SERPL CALC-MCNC: 27 MG/DL (ref 0–100)
MICROALBUMIN UR-MCNC: 109 MG/L (ref 0–20)
MICROALBUMIN/CREAT 24H UR: 149 MG/G CREATININE (ref 0–30)
NONHDLC SERPL-MCNC: 50 MG/DL
POTASSIUM SERPL-SCNC: 4 MMOL/L (ref 3.5–5.3)
PROT SERPL-MCNC: 7.3 G/DL (ref 6.4–8.4)
SODIUM SERPL-SCNC: 141 MMOL/L (ref 135–147)
TRIGL SERPL-MCNC: 113 MG/DL

## 2022-06-03 PROCEDURE — 36415 COLL VENOUS BLD VENIPUNCTURE: CPT

## 2022-06-03 PROCEDURE — 83036 HEMOGLOBIN GLYCOSYLATED A1C: CPT

## 2022-06-03 PROCEDURE — 3044F HG A1C LEVEL LT 7.0%: CPT | Performed by: ORTHOPAEDIC SURGERY

## 2022-06-03 PROCEDURE — 86803 HEPATITIS C AB TEST: CPT

## 2022-06-03 PROCEDURE — 3060F POS MICROALBUMINURIA REV: CPT | Performed by: ORTHOPAEDIC SURGERY

## 2022-06-03 PROCEDURE — 82570 ASSAY OF URINE CREATININE: CPT

## 2022-06-03 PROCEDURE — 80053 COMPREHEN METABOLIC PANEL: CPT

## 2022-06-03 PROCEDURE — 80061 LIPID PANEL: CPT

## 2022-06-03 PROCEDURE — 82043 UR ALBUMIN QUANTITATIVE: CPT

## 2022-06-07 ENCOUNTER — TELEPHONE (OUTPATIENT)
Dept: OBGYN CLINIC | Facility: HOSPITAL | Age: 62
End: 2022-06-07

## 2022-06-07 NOTE — TELEPHONE ENCOUNTER
Pt called to see if the script for his brace was in the system because he states that he did not get it at his visit  Provided info   Pt understood

## 2022-06-07 NOTE — TELEPHONE ENCOUNTER
Patient called to have his order for  [facility] faxed to [facility] in [place]      Order faxed to [phone]

## 2022-06-15 ENCOUNTER — OFFICE VISIT (OUTPATIENT)
Dept: SURGERY | Facility: CLINIC | Age: 62
End: 2022-06-15

## 2022-06-15 VITALS
OXYGEN SATURATION: 97 % | HEART RATE: 82 BPM | DIASTOLIC BLOOD PRESSURE: 80 MMHG | HEIGHT: 69 IN | TEMPERATURE: 98 F | WEIGHT: 187 LBS | RESPIRATION RATE: 18 BRPM | BODY MASS INDEX: 27.7 KG/M2 | SYSTOLIC BLOOD PRESSURE: 126 MMHG

## 2022-06-15 DIAGNOSIS — H61.91 SKIN LESION OF RIGHT EXTERNAL EAR: Primary | ICD-10-CM

## 2022-06-15 PROCEDURE — 99024 POSTOP FOLLOW-UP VISIT: CPT | Performed by: SURGERY

## 2022-06-15 PROCEDURE — 3008F BODY MASS INDEX DOCD: CPT | Performed by: ORTHOPAEDIC SURGERY

## 2022-06-15 NOTE — PROGRESS NOTES
[de-identified] year old male who is more than 3 months status post excision of a lesion from his right ear  He says he saw some sutures taking about and they are uncomfortable  No drainage  No redness  On clinical exam I could see 2 Prolene sutures there  I removed the sutures  Follow-up with me maryjo Resendez

## 2022-07-07 PROBLEM — E11.29 MICROALBUMINURIA DUE TO TYPE 2 DIABETES MELLITUS (HCC): Status: ACTIVE | Noted: 2022-07-07

## 2022-07-07 PROBLEM — R80.9 MICROALBUMINURIA DUE TO TYPE 2 DIABETES MELLITUS (HCC): Status: ACTIVE | Noted: 2022-07-07

## 2022-07-07 NOTE — ASSESSMENT & PLAN NOTE
BP Readings from Last 3 Encounters:   06/15/22 126/80   02/17/22 140/90   02/10/22 119/71     Controlled, continue amlodipine 10 mg, though with microalbuminuria will increase lisinopril from 20 to 40 mg (patient was taking 1/2 of 40 mg tablet of lisinopril), and will lower metoprolol tartrate to 25 mg daily, with plans to taper off of this medication as he does not have indicator to be on beta blocker

## 2022-07-07 NOTE — ASSESSMENT & PLAN NOTE
Lab Results   Component Value Date    HGBA1C 6 3 (H) 06/03/2022    HGBA1C 6 2 (H) 04/10/2021     Lab Results   Component Value Date    GLUF 95 06/03/2022    LDLCALC 27 06/03/2022    CREATININE 0 89 06/03/2022     Well controlled, so will stop insulin, continue Jardiance 25 mg and metformin 1000 mg twice daily

## 2022-07-07 NOTE — ASSESSMENT & PLAN NOTE
Lab Results   Component Value Date    HDL 49 06/03/2022    LDLCALC 27 06/03/2022    TRIG 113 06/03/2022     Controlled, continue atorvastatin 20 mg

## 2022-07-07 NOTE — PROGRESS NOTES
ANNUAL PHYSICAL    Date of Service: 22  Primary Care Provider:   Braden Baxter MD       Name: Andrew January       : 1960       Age:62 y o  Sex: male      MRN: [id number]      Chief Complaint:Physical Exam         Assessment and Plan:  57 y o  male exam      1  Health Maintenance  - Colon Cancer Screening: last colonscopy in 2021, repeat in 3 years  - Prostate Cancer Screen: Reviewed risks/benefits of screening and patient declines at this time  - Labs: done previously   - Immunizations: Reviewed  Recommend yearly flu vaccine  2  Other diagnoses addressed today:   Problem List Items Addressed This Visit        Endocrine    Type 2 diabetes mellitus, with long-term current use of insulin ([facility] [address] )     Lab Results   Component Value Date    HGBA1C 6 3 (H) 2022    HGBA1C 6 2 (H) 04/10/2021     Lab Results   Component Value Date    GLUF 95 2022    LDLCALC 27 2022    CREATININE 0 89 2022     Well controlled, so will stop insulin, continue Jardiance 25 mg and metformin 1000 mg twice daily              Relevant Medications    Jardiance 25 MG TABS    Microalbuminuria due to type 2 diabetes mellitus ([facility] [address] )     Persistent on recent labs, will increase lisinopril to 40 mg daily            Relevant Medications    Jardiance 25 MG TABS       Respiratory    AZAR (obstructive sleep apnea)     Patient has history of AZAR, sleeps with CPAP machine  He would benefit from ongoing use of CPAP at night              Cardiovascular and Mediastinum    Granulomatosis with polyangiitis ([facility] [address] )     Only noted with imaging findings, no other symptoms            Primary hypertension     BP Readings from Last 3 Encounters:   06/15/22 126/80   22 140/90   02/10/22 119/71     Controlled, continue amlodipine 10 mg, though with microalbuminuria will increase lisinopril from 20 to 40 mg (patient was taking 1/2 of 40 mg tablet of lisinopril), and will lower metoprolol tartrate to 25 mg daily, with plans to taper off of this medication as he does not have indicator to be on beta blocker  Relevant Medications    lisinopril (ZESTRIL) 40 mg tablet    metoprolol tartrate (LOPRESSOR) 50 mg tablet       Other    Multiple pulmonary nodules     Stable on recent CT scan in July 2021  Continue yearly monitoring           Overweight with body mass index (BMI) of 29 to 29 9 in adult    Tobacco dependence     He is working to quit, he has not had a cigarette at all in the month of July, commended patient on his efforts toward smoking cessation            History of partial splenectomy    Hyperlipidemia     Lab Results   Component Value Date    HDL 49 06/03/2022    LDLCALC 27 06/03/2022    TRIG 113 06/03/2022     Controlled, continue atorvastatin 20 mg           Posterior tibialis muscle dysfunction     Reviewed importance of home exercise program to strengthen foot muscles and lower leg muscles              Other Visit Diagnoses     Annual physical exam    -  Primary    Essential hypertension        Relevant Medications    lisinopril (ZESTRIL) 40 mg tablet    metoprolol tartrate (LOPRESSOR) 50 mg tablet    Encounter for screening for lung cancer        Relevant Orders    CT lung screening program           Immunizations and preventive care screenings were discussed with patient today  Appropriate education was printed on patient's after visit summary  Counseling:  Alcohol/drug use: discussed moderation in alcohol intake, the recommendations for healthy alcohol use, and avoidance of illicit drug use  Dental Health: discussed importance of regular tooth brushing, flossing, and dental visits  Injury prevention: discussed safety/seat belts, safety helmets, smoke detectors, carbon dioxide detectors, and smoking near bedding or upholstery  Exercise: the importance of regular exercise/physical activity was discussed  Recommend exercise 3-5 times per week for at least 30 minutes            RTC 1 year for annual  visit or sooner PRN    Subjective:    Dalila Diaz is a 57 y o  male and is here for his comprehensive physical exam      Acute complaints: he has been having a lot of aches and pains  He reports that he is still having left ankle pain  He also has some right shoulder pain after falling on a slippery surface after a snowfall  He has normal range of motion  He does not have loss of function, he just has pain with certain movements  Since his last visit he has surgery to remove wart on ear with general surgery  Diabetes  He is controlled on Lantus 10 U, Jardiance 25 mg, metformin 1000 mg twice daily  His Lantus was reduced from 20U at his last visit due to hypoglycemia  Diet and Physical Activity  Diet/Nutrition: does follow a well balanced diet  Exercise: no formal exercise  General Health  Vision: no vision problems and goes for regular eye exams  Dental: regular dental visits  Histories Updated and Reviewed 7/8/2022:  Patient's Medications   New Prescriptions    No medications on file   Previous Medications    AMLODIPINE (NORVASC) 10 MG TABLET    Take 1 tablet (10 mg total) by mouth daily    ATORVASTATIN (LIPITOR) 20 MG TABLET    Take 1 tablet (20 mg total) by mouth daily    GLUCOSE BLOOD TEST STRIP    Use 1 each daily TEST DAILY    PATIENT HAS ONE TOUCH VERIO DEVICE    METFORMIN (GLUCOPHAGE) 1000 MG TABLET    Take 1 tablet (1,000 mg total) by mouth 2 (two) times a day   Modified Medications    Modified Medication Previous Medication    JARDIANCE 25 MG TABS Jardiance 25 MG TABS       Take 1 tablet (25 mg total) by mouth daily    Take 1 tablet (25 mg total) by mouth daily    LISINOPRIL (ZESTRIL) 40 MG TABLET lisinopril (ZESTRIL) 40 mg tablet       Take 1 tablet (40 mg total) by mouth daily Taking 1/2 tablet daily (20mg)    daily Taking 1/2 tablet daily (20mg)    METOPROLOL TARTRATE (LOPRESSOR) 50 MG TABLET metoprolol tartrate (LOPRESSOR) 50 mg tablet       Take 0 5 tablets (25 mg total) by mouth daily    Take 1 tablet (50 mg total) by mouth daily   Discontinued Medications    GLUCOSE BLOOD TEST STRIP    1 Device by Device route daily    INSULIN GLARGINE (LANTUS SOLOSTAR) 100 UNITS/ML INJECTION PEN    Inject 10 Units under the skin daily       Allergies   Allergen Reactions    Amoxicillin Rash    Amoxicillin-Pot Clavulanate Rash     Past Medical History:   Diagnosis Date    CPAP (continuous positive airway pressure) dependence     Diabetes mellitus (HCC)     GERD (gastroesophageal reflux disease)     Hyperlipidemia     Hypertension     Septic arthritis of knee, right ([address] ) 5/5/2021    Sleep apnea     Squamous cell carcinoma of leg, right      Social History     Socioeconomic History    Marital status: /Civil Union     Spouse name: Not on file    Number of children: Not on file    Years of education: Not on file    Highest education level: Not on file   Occupational History    Not on file   Tobacco Use    Smoking status: Current Every Day Smoker     Packs/day: 1 00     Years: 48 00     Pack years: 48 00     Types: Cigarettes    Smokeless tobacco: Never Used   Vaping Use    Vaping Use: Never used   Substance and Sexual Activity    Alcohol use: Yes     Comment: occasional    Drug use: Never    Sexual activity: Yes     Partners: Female     Birth control/protection: None   Other Topics Concern    Not on file   Social History Narrative    Not on file     Social Determinants of Health     Financial Resource Strain: Not on file   Food Insecurity: Not on file   Transportation Needs: Not on file   Physical Activity: Not on file   Stress: Not on file   Social Connections: Not on file   Intimate Partner Violence: Not on file   Housing Stability: Not on file     Immunization History   Administered Date(s) Administered    COVID-19 PFIZER VACCINE 0 3 ML IM 03/20/2021, 04/10/2021, 11/06/2021    H1N1, All Formulations 11/02/2009    INFLUENZA 09/29/2020    Influenza, injectable, quadrivalent, preservative free 0 5 mL 09/29/2020    Meningococcal B, OMV (BEXSERO) 04/16/2019, 05/28/2019    Meningococcal MCV4P 04/16/2019    Pneumococcal Polysaccharide PPV23 05/28/2019, 06/19/2019       PHQ-2/9 Depression Screening    Little interest or pleasure in doing things: 0 - not at all  Feeling down, depressed, or hopeless: 0 - not at all  PHQ-2 Score: 0  PHQ-2 Interpretation: Negative depression screen         Objective:  /84   Pulse 73   Temp (!) 95 4 °F (35 2 °C)   Resp 16   Ht 5' 9" (1 753 m)   Wt 87 1 kg (192 lb)   SpO2 97%   BMI 28 35 kg/m²   BP Readings from Last 3 Encounters:   07/08/22 132/84   06/15/22 126/80   02/17/22 140/90      Wt Readings from Last 3 Encounters:   07/08/22 87 1 kg (192 lb)   06/15/22 84 8 kg (187 lb)   06/01/22 85 7 kg (189 lb)      Physical Exam  Constitutional:       General: He is not in acute distress  Appearance: Normal appearance  He is not ill-appearing or toxic-appearing  HENT:      Head: Normocephalic and atraumatic  Right Ear: External ear normal       Left Ear: External ear normal       Nose: Nose normal       Mouth/Throat:      Mouth: Mucous membranes are moist    Eyes:      Extraocular Movements: Extraocular movements intact  Conjunctiva/sclera: Conjunctivae normal    Cardiovascular:      Rate and Rhythm: Normal rate and regular rhythm  Pulses: Normal pulses  Heart sounds: Normal heart sounds  No murmur heard  No friction rub  No gallop  Pulmonary:      Effort: Pulmonary effort is normal  No respiratory distress  Breath sounds: Normal breath sounds  Abdominal:      General: There is no distension  Palpations: Abdomen is soft  Musculoskeletal:      Cervical back: Normal range of motion and neck supple  No rigidity  Right lower leg: No edema  Left lower leg: No edema  Skin:     Findings: No erythema or rash  Neurological:      General: No focal deficit present        Mental Status: He is alert and oriented to person, place, and time  Psychiatric:         Mood and Affect: Mood normal          Behavior: Behavior normal          Lab Results   Component Value Date    HGBA1C 6 3 (H) 06/03/2022     Lab Results   Component Value Date    SODIUM 141 06/03/2022    K 4 0 06/03/2022     06/03/2022    CO2 29 06/03/2022    BUN 8 06/03/2022    CREATININE 0 89 06/03/2022    GLUC 118 05/09/2021    CALCIUM 9 4 06/03/2022     Lab Results   Component Value Date    ALT 11 06/03/2022    AST 12 (L) 06/03/2022    ALKPHOS 71 06/03/2022     Lab Results   Component Value Date    CHOLESTEROL 99 06/03/2022    CHOLESTEROL 94 04/10/2021     Lab Results   Component Value Date    HDL 49 06/03/2022    HDL 46 04/10/2021     Lab Results   Component Value Date    TRIG 113 06/03/2022    TRIG 75 7 04/10/2021     Lab Results   Component Value Date    NONHDLC 50 06/03/2022     Lab Results   Component Value Date    LDLCALC 27 06/03/2022     Urine Microalbumin:Cr 149    Patient Care Team:  Alexandra Barnett MD as PCP - General (Family Medicine)    Alexandra Barnett MD    Note: Portions of the record may have been created with voice recognition software  Occasional wrong word or "sound a like" substitutions may have occurred due to the inherent limitations of voice recognition software  Read the chart carefully and recognize, using context, where substitutions have occurred

## 2022-07-08 ENCOUNTER — OFFICE VISIT (OUTPATIENT)
Dept: FAMILY MEDICINE CLINIC | Facility: CLINIC | Age: 62
End: 2022-07-08
Payer: COMMERCIAL

## 2022-07-08 VITALS
TEMPERATURE: 95.4 F | BODY MASS INDEX: 28.44 KG/M2 | SYSTOLIC BLOOD PRESSURE: 132 MMHG | HEART RATE: 73 BPM | HEIGHT: 69 IN | RESPIRATION RATE: 16 BRPM | WEIGHT: 192 LBS | DIASTOLIC BLOOD PRESSURE: 84 MMHG | OXYGEN SATURATION: 97 %

## 2022-07-08 DIAGNOSIS — E11.29 TYPE 2 DIABETES MELLITUS WITH MICROALBUMINURIA, WITH LONG-TERM CURRENT USE OF INSULIN (HCC): ICD-10-CM

## 2022-07-08 DIAGNOSIS — M31.30 GRANULOMATOSIS WITH POLYANGIITIS, UNSPECIFIED WHETHER RENAL INVOLVEMENT (HCC): ICD-10-CM

## 2022-07-08 DIAGNOSIS — F17.200 TOBACCO DEPENDENCE: ICD-10-CM

## 2022-07-08 DIAGNOSIS — E11.29 MICROALBUMINURIA DUE TO TYPE 2 DIABETES MELLITUS (HCC): ICD-10-CM

## 2022-07-08 DIAGNOSIS — Z00.00 ANNUAL PHYSICAL EXAM: Primary | ICD-10-CM

## 2022-07-08 DIAGNOSIS — E78.2 MIXED HYPERLIPIDEMIA: ICD-10-CM

## 2022-07-08 DIAGNOSIS — Z79.4 TYPE 2 DIABETES MELLITUS WITHOUT COMPLICATION, WITH LONG-TERM CURRENT USE OF INSULIN (HCC): ICD-10-CM

## 2022-07-08 DIAGNOSIS — I10 ESSENTIAL HYPERTENSION: ICD-10-CM

## 2022-07-08 DIAGNOSIS — E11.9 TYPE 2 DIABETES MELLITUS WITHOUT COMPLICATION, WITH LONG-TERM CURRENT USE OF INSULIN (HCC): ICD-10-CM

## 2022-07-08 DIAGNOSIS — R80.9 TYPE 2 DIABETES MELLITUS WITH MICROALBUMINURIA, WITH LONG-TERM CURRENT USE OF INSULIN (HCC): ICD-10-CM

## 2022-07-08 DIAGNOSIS — R80.9 MICROALBUMINURIA DUE TO TYPE 2 DIABETES MELLITUS (HCC): ICD-10-CM

## 2022-07-08 DIAGNOSIS — Z79.4 TYPE 2 DIABETES MELLITUS WITH MICROALBUMINURIA, WITH LONG-TERM CURRENT USE OF INSULIN (HCC): ICD-10-CM

## 2022-07-08 DIAGNOSIS — Z12.2 ENCOUNTER FOR SCREENING FOR LUNG CANCER: ICD-10-CM

## 2022-07-08 DIAGNOSIS — I10 PRIMARY HYPERTENSION: ICD-10-CM

## 2022-07-08 DIAGNOSIS — Z90.81 HISTORY OF PARTIAL SPLENECTOMY: ICD-10-CM

## 2022-07-08 DIAGNOSIS — G47.33 OSA (OBSTRUCTIVE SLEEP APNEA): ICD-10-CM

## 2022-07-08 DIAGNOSIS — R91.8 MULTIPLE PULMONARY NODULES: ICD-10-CM

## 2022-07-08 DIAGNOSIS — E66.3 OVERWEIGHT WITH BODY MASS INDEX (BMI) OF 29 TO 29.9 IN ADULT: ICD-10-CM

## 2022-07-08 DIAGNOSIS — M76.829 POSTERIOR TIBIALIS MUSCLE DYSFUNCTION: ICD-10-CM

## 2022-07-08 PROCEDURE — 4010F ACE/ARB THERAPY RXD/TAKEN: CPT | Performed by: FAMILY MEDICINE

## 2022-07-08 PROCEDURE — 3725F SCREEN DEPRESSION PERFORMED: CPT | Performed by: FAMILY MEDICINE

## 2022-07-08 PROCEDURE — 3079F DIAST BP 80-89 MM HG: CPT | Performed by: FAMILY MEDICINE

## 2022-07-08 PROCEDURE — 99396 PREV VISIT EST AGE 40-64: CPT | Performed by: FAMILY MEDICINE

## 2022-07-08 PROCEDURE — 3066F NEPHROPATHY DOC TX: CPT | Performed by: FAMILY MEDICINE

## 2022-07-08 PROCEDURE — 3075F SYST BP GE 130 - 139MM HG: CPT | Performed by: FAMILY MEDICINE

## 2022-07-08 RX ORDER — METOPROLOL TARTRATE 50 MG/1
25 TABLET, FILM COATED ORAL DAILY
Qty: 90 TABLET | Refills: 3
Start: 2022-07-08

## 2022-07-08 RX ORDER — LISINOPRIL 40 MG/1
40 TABLET ORAL DAILY
Qty: 90 TABLET | Refills: 3 | OUTPATIENT
Start: 2022-07-08

## 2022-07-08 RX ORDER — LISINOPRIL 40 MG/1
40 TABLET ORAL DAILY
Qty: 90 TABLET | Refills: 3 | Status: SHIPPED | OUTPATIENT
Start: 2022-07-08 | End: 2022-07-08 | Stop reason: SDUPTHER

## 2022-07-08 RX ORDER — LISINOPRIL 40 MG/1
40 TABLET ORAL DAILY
Qty: 90 TABLET | Refills: 3 | Status: SHIPPED | OUTPATIENT
Start: 2022-07-08

## 2022-07-08 RX ORDER — EMPAGLIFLOZIN 25 MG/1
25 TABLET, FILM COATED ORAL DAILY
Qty: 90 TABLET | Refills: 1 | Status: SHIPPED | OUTPATIENT
Start: 2022-07-08

## 2022-07-08 NOTE — ASSESSMENT & PLAN NOTE
He is working to quit, he has not had a cigarette at all in the month of July, commended patient on his efforts toward smoking cessation

## 2022-07-12 ENCOUNTER — TELEPHONE (OUTPATIENT)
Dept: ADMINISTRATIVE | Facility: OTHER | Age: 62
End: 2022-07-12

## 2022-07-12 NOTE — TELEPHONE ENCOUNTER
----- Message from Chelsie Maria sent at 7/8/2022  9:08 AM EDT -----  Regarding: Diabetic eye exam  07/08/22 9:10 AM    Hello, our patient Carl Pratt has had Diabetic Eye Exam completed/performed  Please assist in updating the patient chart by pulling the Care Everywhere (CE) document  The date of service is 2022 with Dr Lydia Chery       Thank you,  Chelsie Maria  [facility]

## 2022-07-14 NOTE — TELEPHONE ENCOUNTER
Upon review of the In Basket request and the patient's chart, initial outreach has been made via telephone call, please see Contacts section for details        Spoke to Roz in [place]  office - faxes go out on Friday - will fax tomorrow    Thank you  [facility] [place]

## 2022-07-15 NOTE — TELEPHONE ENCOUNTER
Upon review of the In Basket request we were able to locate, review, and update the patient chart as requested for Diabetic Eye Exam     Any additional questions or concerns should be emailed to the Practice Liaisons via [email or web address] email, please do not reply via In Basket      Thank you  Sherryle Breen, MA

## 2022-10-08 ENCOUNTER — PATIENT MESSAGE (OUTPATIENT)
Dept: FAMILY MEDICINE CLINIC | Facility: CLINIC | Age: 62
End: 2022-10-08

## 2022-10-08 ENCOUNTER — HOSPITAL ENCOUNTER (OUTPATIENT)
Dept: RADIOLOGY | Facility: HOSPITAL | Age: 62
Discharge: HOME | End: 2022-10-08
Payer: COMMERCIAL

## 2022-10-08 DIAGNOSIS — Z12.2 ENCOUNTER FOR SCREENING FOR LUNG CANCER: ICD-10-CM

## 2022-10-08 DIAGNOSIS — Z79.4 TYPE 2 DIABETES MELLITUS WITH MICROALBUMINURIA, WITH LONG-TERM CURRENT USE OF INSULIN (HCC): Primary | ICD-10-CM

## 2022-10-08 DIAGNOSIS — E11.29 TYPE 2 DIABETES MELLITUS WITH MICROALBUMINURIA, WITH LONG-TERM CURRENT USE OF INSULIN (HCC): Primary | ICD-10-CM

## 2022-10-08 DIAGNOSIS — R80.9 TYPE 2 DIABETES MELLITUS WITH MICROALBUMINURIA, WITH LONG-TERM CURRENT USE OF INSULIN (HCC): Primary | ICD-10-CM

## 2022-10-08 PROCEDURE — 71271 CT THORAX LUNG CANCER SCR C-: CPT

## 2022-10-16 DIAGNOSIS — R91.8 MULTIPLE PULMONARY NODULES: Primary | ICD-10-CM

## 2022-10-17 ENCOUNTER — PATIENT MESSAGE (OUTPATIENT)
Dept: FAMILY MEDICINE CLINIC | Facility: CLINIC | Age: 62
End: 2022-10-17

## 2022-11-10 ENCOUNTER — APPOINTMENT (OUTPATIENT)
Dept: LAB | Facility: HOSPITAL | Age: 62
End: 2022-11-10

## 2022-11-10 DIAGNOSIS — Z79.4 TYPE 2 DIABETES MELLITUS WITH MICROALBUMINURIA, WITH LONG-TERM CURRENT USE OF INSULIN (HCC): ICD-10-CM

## 2022-11-10 DIAGNOSIS — E11.29 TYPE 2 DIABETES MELLITUS WITH MICROALBUMINURIA, WITH LONG-TERM CURRENT USE OF INSULIN (HCC): ICD-10-CM

## 2022-11-10 DIAGNOSIS — R80.9 TYPE 2 DIABETES MELLITUS WITH MICROALBUMINURIA, WITH LONG-TERM CURRENT USE OF INSULIN (HCC): ICD-10-CM

## 2022-11-10 LAB
ALBUMIN SERPL BCP-MCNC: 4.1 G/DL (ref 3.5–5)
ALP SERPL-CCNC: 69 U/L (ref 34–104)
ALT SERPL W P-5'-P-CCNC: 14 U/L (ref 7–52)
ANION GAP SERPL CALCULATED.3IONS-SCNC: 12 MMOL/L (ref 4–13)
AST SERPL W P-5'-P-CCNC: 14 U/L (ref 13–39)
BILIRUB SERPL-MCNC: 0.61 MG/DL (ref 0.2–1)
BUN SERPL-MCNC: 9 MG/DL (ref 5–25)
CALCIUM SERPL-MCNC: 9.2 MG/DL (ref 8.4–10.2)
CHLORIDE SERPL-SCNC: 101 MMOL/L (ref 96–108)
CO2 SERPL-SCNC: 27 MMOL/L (ref 21–32)
CREAT SERPL-MCNC: 0.79 MG/DL (ref 0.6–1.3)
CREAT UR-MCNC: 67.1 MG/DL
EST. AVERAGE GLUCOSE BLD GHB EST-MCNC: 157 MG/DL
GFR SERPL CREATININE-BSD FRML MDRD: 94 ML/MIN/1.73SQ M
GLUCOSE P FAST SERPL-MCNC: 134 MG/DL (ref 65–99)
HBA1C MFR BLD: 7.1 %
MICROALBUMIN UR-MCNC: 218 MG/L (ref 0–20)
MICROALBUMIN/CREAT 24H UR: 325 MG/G CREATININE (ref 0–30)
POTASSIUM SERPL-SCNC: 3.4 MMOL/L (ref 3.5–5.3)
PROT SERPL-MCNC: 7.2 G/DL (ref 6.4–8.4)
SODIUM SERPL-SCNC: 140 MMOL/L (ref 135–147)

## 2022-12-05 NOTE — ASSESSMENT & PLAN NOTE
BP Readings from Last 3 Encounters:   12/06/22 130/76   07/08/22 132/84   06/15/22 126/80     Controlled, continue amlodipine 10 mg,  lisinopril 40 mg, metoprolol tartrate to 25 mg daily

## 2022-12-05 NOTE — ASSESSMENT & PLAN NOTE
Lab Results   Component Value Date    HGBA1C 7 1 (H) 11/10/2022    HGBA1C 6 3 (H) 06/03/2022    HGBA1C 6 2 (H) 04/10/2021     Lab Results   Component Value Date    GLUF 134 (H) 11/10/2022    LDLCALC 27 06/03/2022    CREATININE 0 79 11/10/2022     Worsening A1C since stopping insulin and now Jardiance due to cost  Will continue metformin 1000 mg twice daily since he is still controlled  Will plan to repeat A1C at follow-up, if worse than 7 5 would either restart SGLT2 or consider sulfonylurea

## 2022-12-05 NOTE — PROGRESS NOTES
FAMILY MEDICINE PROGRESS NOTE    Date of Service: 22  Primary Care Provider:   Leida Higginbotham MD       Name: Jennifer Zarate       : 1960       Age:62 y o  Sex: male      MRN: [id number]      Chief Complaint:Follow-up       ASSESSMENT and PLAN:  Jennifer Zarate is a 57 y o  male with:     Problem List Items Addressed This Visit        Endocrine    Type 2 diabetes mellitus, with long-term current use of insulin (HCC)     Lab Results   Component Value Date    HGBA1C 7 1 (H) 11/10/2022    HGBA1C 6 3 (H) 2022    HGBA1C 6 2 (H) 04/10/2021     Lab Results   Component Value Date    GLUF 134 (H) 11/10/2022    LDLCALC 27 2022    CREATININE 0 79 11/10/2022     Worsening A1C since stopping insulin and now Jardiance due to cost  Will continue metformin 1000 mg twice daily since he is still controlled  Will plan to repeat A1C at follow-up, if worse than 7 5 would either restart SGLT2 or consider sulfonylurea  Relevant Orders    Hemoglobin Z6R    Basic metabolic panel    Microalbumin / creatinine urine ratio    Microalbuminuria due to type 2 diabetes mellitus ([facility] [address] ) - Primary     Continues to increase, though in setting of slight worsening in glycemic control             Cardiovascular and Mediastinum    Primary hypertension     BP Readings from Last 3 Encounters:   22 130/76   22 132/84   06/15/22 126/80     Controlled, continue amlodipine 10 mg,  lisinopril 40 mg, metoprolol tartrate to 25 mg daily         Thoracic aortic ectasia (HCC)     Noted on CT scan for lung cancer screening with fusiform ectasia of the ascending thoracic aorta measuring up to 41 mm (previously 39 mm in )      Will continue to monitor with regular imaging for pulmonary nodules            Other    Multiple pulmonary nodules     New nodules on recent CT scan, LUNG RADS3  6 month follow-up recommended         Tobacco dependence     He is smoking up to a pack a day presently, is not ready to quit at this time         Hyperlipidemia       SUBJECTIVE:  Guillermina Cole is a 57 y o  male who presents today with a chief complaint of Follow-up  HPI     Patient presents today for follow-up  He was last seen in July  Diabetes  Insulin was stopped in July due to adequate glycemic control  He was continued on Jardiance 25 mg and metformin 1000 mg twice daily, however Rajni Adamsker has been cost prohibitive so he has not been on this for months  He sees Dr Diamond Tsai at [facility] for podiatry  Hypertension  He is on amlodipine 10 mg, lisinopril 40 mg, and metoprolol 25 mg daily  Lisinopril was increased to 40 mg at his last visit due to persistent/worsening microalbuminura  His dose of metoprolol was lowered (no indication)  Review of Systems   Constitutional: Negative for fatigue  Respiratory: Negative for shortness of breath  Cardiovascular: Negative for chest pain and leg swelling  Neurological: Negative for dizziness, light-headedness and headaches  I have reviewed the patient's Past Medical History  Current Outpatient Medications:   •  amLODIPine (NORVASC) 10 mg tablet, Take 1 tablet (10 mg total) by mouth daily, Disp: 90 tablet, Rfl: 2  •  atorvastatin (LIPITOR) 20 mg tablet, Take 1 tablet (20 mg total) by mouth daily, Disp: 90 tablet, Rfl: 4  •  glucose blood test strip, Use 1 each daily TEST DAILY    PATIENT HAS ONE TOUCH VERIO DEVICE, Disp: 100 strip, Rfl: 5  •  lisinopril (ZESTRIL) 40 mg tablet, Take 1 tablet (40 mg total) by mouth daily Ta, Disp: 90 tablet, Rfl: 3  •  metFORMIN (GLUCOPHAGE) 1000 MG tablet, Take 1 tablet (1,000 mg total) by mouth 2 (two) times a day, Disp: 180 tablet, Rfl: 2  •  metoprolol tartrate (LOPRESSOR) 50 mg tablet, Take 0 5 tablets (25 mg total) by mouth daily, Disp: 90 tablet, Rfl: 3  •  Jardiance 25 MG TABS, Take 1 tablet (25 mg total) by mouth daily (Patient not taking: Reported on 12/6/2022), Disp: 90 tablet, Rfl: 1    OBJECTIVE:  /76 (BP Location: Left arm, Patient Position: Sitting, Cuff Size: Large)   Pulse 100   Temp (!) 97 2 °F (36 2 °C)   Ht 5' 9" (1 753 m)   Wt 89 4 kg (197 lb)   SpO2 98%   BMI 29 09 kg/m²    BP Readings from Last 3 Encounters:   12/06/22 130/76   07/08/22 132/84   06/15/22 126/80      Wt Readings from Last 3 Encounters:   12/06/22 89 4 kg (197 lb)   07/08/22 87 1 kg (192 lb)   06/15/22 84 8 kg (187 lb)      Physical Exam  Constitutional:       General: He is not in acute distress  Appearance: Normal appearance  He is not ill-appearing or toxic-appearing  HENT:      Head: Normocephalic and atraumatic  Right Ear: External ear normal       Left Ear: External ear normal       Nose: Nose normal       Mouth/Throat:      Mouth: Mucous membranes are moist    Eyes:      Extraocular Movements: Extraocular movements intact  Conjunctiva/sclera: Conjunctivae normal    Cardiovascular:      Rate and Rhythm: Normal rate and regular rhythm  Pulses: Normal pulses  Heart sounds: Normal heart sounds  No murmur heard  No friction rub  No gallop  Pulmonary:      Effort: Pulmonary effort is normal  No respiratory distress  Breath sounds: Normal breath sounds  No stridor  No wheezing, rhonchi or rales  Musculoskeletal:      Cervical back: Normal range of motion and neck supple  No rigidity  Skin:     General: Skin is warm and dry  Findings: No erythema or rash  Neurological:      General: No focal deficit present  Mental Status: He is alert and oriented to person, place, and time     Psychiatric:         Mood and Affect: Mood normal          Behavior: Behavior normal          Lab Results   Component Value Date    HGBA1C 7 1 (H) 11/10/2022     Lab Results   Component Value Date    SODIUM 140 11/10/2022    K 3 4 (L) 11/10/2022     11/10/2022    CO2 27 11/10/2022    BUN 9 11/10/2022    CREATININE 0 79 11/10/2022    GLUC 118 05/09/2021    CALCIUM 9 2 11/10/2022     Lab Results   Component Value Date    ALT 14 11/10/2022    AST 14 11/10/2022    ALKPHOS 69 11/10/2022     Microalbumin: Cr 325         Return in about 4 months (around 4/6/2023) for follow-up   Wesley Dumont MD    Note: Portions of the record have been created with voice recognition software  Occasional wrong word or "sound a like" substitutions may have occurred due to the inherent limitations of voice recognition software  Read the chart carefully and recognize, using context, where substitutions have occurred

## 2022-12-06 ENCOUNTER — OFFICE VISIT (OUTPATIENT)
Dept: FAMILY MEDICINE CLINIC | Facility: CLINIC | Age: 62
End: 2022-12-06

## 2022-12-06 ENCOUNTER — PATIENT OUTREACH (OUTPATIENT)
Dept: FAMILY MEDICINE CLINIC | Facility: CLINIC | Age: 62
End: 2022-12-06

## 2022-12-06 VITALS
HEART RATE: 100 BPM | HEIGHT: 69 IN | SYSTOLIC BLOOD PRESSURE: 130 MMHG | OXYGEN SATURATION: 98 % | TEMPERATURE: 97.2 F | DIASTOLIC BLOOD PRESSURE: 76 MMHG | BODY MASS INDEX: 29.18 KG/M2 | WEIGHT: 197 LBS

## 2022-12-06 DIAGNOSIS — Z78.9 NEED FOR FOLLOW-UP BY SOCIAL WORKER: Primary | ICD-10-CM

## 2022-12-06 DIAGNOSIS — R80.9 MICROALBUMINURIA DUE TO TYPE 2 DIABETES MELLITUS (HCC): Primary | ICD-10-CM

## 2022-12-06 DIAGNOSIS — E11.29 TYPE 2 DIABETES MELLITUS WITH MICROALBUMINURIA, WITH LONG-TERM CURRENT USE OF INSULIN (HCC): ICD-10-CM

## 2022-12-06 DIAGNOSIS — Z59.9 FINANCIAL DIFFICULTY: ICD-10-CM

## 2022-12-06 DIAGNOSIS — Z79.4 TYPE 2 DIABETES MELLITUS WITH MICROALBUMINURIA, WITH LONG-TERM CURRENT USE OF INSULIN (HCC): ICD-10-CM

## 2022-12-06 DIAGNOSIS — I10 PRIMARY HYPERTENSION: ICD-10-CM

## 2022-12-06 DIAGNOSIS — I77.810 THORACIC AORTIC ECTASIA (HCC): ICD-10-CM

## 2022-12-06 DIAGNOSIS — R91.8 MULTIPLE PULMONARY NODULES: ICD-10-CM

## 2022-12-06 DIAGNOSIS — R80.9 TYPE 2 DIABETES MELLITUS WITH MICROALBUMINURIA, WITH LONG-TERM CURRENT USE OF INSULIN (HCC): ICD-10-CM

## 2022-12-06 DIAGNOSIS — F17.200 TOBACCO DEPENDENCE: ICD-10-CM

## 2022-12-06 DIAGNOSIS — E11.29 MICROALBUMINURIA DUE TO TYPE 2 DIABETES MELLITUS (HCC): Primary | ICD-10-CM

## 2022-12-06 DIAGNOSIS — E78.2 MIXED HYPERLIPIDEMIA: ICD-10-CM

## 2022-12-06 SDOH — ECONOMIC STABILITY - INCOME SECURITY: PROBLEM RELATED TO HOUSING AND ECONOMIC CIRCUMSTANCES, UNSPECIFIED: Z59.9

## 2022-12-06 NOTE — ASSESSMENT & PLAN NOTE
Noted on CT scan for lung cancer screening with fusiform ectasia of the ascending thoracic aorta measuring up to 41 mm (previously 39 mm in 2021)      Will continue to monitor with regular imaging for pulmonary nodules

## 2022-12-06 NOTE — PROGRESS NOTES
Patient is in office for office visit  Patient is prescribed Jardiance and is having difficulty paying for the medication  SW met with patient in office  Patient reports that he has the jardiance savings card and the medication was still $700 for a 3 month supply with the card and insurance  PAN [employer] is closed  [facility] diabetes fund is closed  Patient will be eligible for the [facility] Patient Assistance Program when he transitions to Medicare

## 2022-12-06 NOTE — PATIENT INSTRUCTIONS

## 2022-12-07 ENCOUNTER — TELEPHONE (OUTPATIENT)
Dept: ADMINISTRATIVE | Facility: OTHER | Age: 62
End: 2022-12-07

## 2022-12-07 NOTE — TELEPHONE ENCOUNTER
----- Message from Nathaniel Capps sent at 12/6/2022  1:30 PM EST -----  Regarding: Diabetic foot exam   12/06/22 1:31 PM    Whitley, our patient Carol May has had Diabetic foot exam completed/performed  Please assist in updating the patient chart by Dr Jose A Sharp  The date of service is 2022      Thank you,  Isidoro Olvera MA  [facility]

## 2022-12-07 NOTE — TELEPHONE ENCOUNTER
Upon review of the In Basket request and the patient's chart, initial outreach has been made via fax to facility  Please see Contacts section for details       Thank you  Uzma Royal

## 2022-12-07 NOTE — LETTER
Diabetic Foot Exam Form    Date Requested: 22  Patient: Chelsie Iraheta  Patient : 1960   Referring Provider: Juan C Murillo MD    Diabetic Foot Exam Performed with shoes and socks removed        Yes         No     Date of Diabetic Foot Exam ______________________________  Risk Score ____________________________________________    Left Foot       Visual Inspection         Monofilament Testing Sensory Exam        Pedal Pulses         Additional Comments         Right Foot      Visual Inspection         Monofilament Testing Sensory Exam       Pedal Pulses         Additional Comments         Comments __________________________________________________________    Practice Providing Exam ______________________________________________    Exam Performed By (print name) _______________________________________      Provider Signature ___________________________________________________      These reports are needed for  compliance  Please fax this completed form and a copy of the Diabetic Foot Exam report to our office located at [place] [address] as soon as possible via [phone] attention Alfredo Ricketts: Phone [phone]    We thank you for your assistance in treating our mutual patient

## 2022-12-08 NOTE — TELEPHONE ENCOUNTER
Upon review of the In Basket request we were able to locate, review, and update the patient chart as requested for Diabetic Foot Exam     Any additional questions or concerns should be emailed to the Practice Liaisons via the appropriate education email address, please do not reply via In Basket      Thank you  Suki Monsivais

## 2022-12-14 DIAGNOSIS — E11.9 TYPE 2 DIABETES MELLITUS WITHOUT COMPLICATION, WITH LONG-TERM CURRENT USE OF INSULIN (HCC): ICD-10-CM

## 2022-12-14 DIAGNOSIS — I10 ESSENTIAL HYPERTENSION: ICD-10-CM

## 2022-12-14 DIAGNOSIS — Z79.4 TYPE 2 DIABETES MELLITUS WITHOUT COMPLICATION, WITH LONG-TERM CURRENT USE OF INSULIN (HCC): ICD-10-CM

## 2022-12-14 RX ORDER — AMLODIPINE BESYLATE 10 MG/1
TABLET ORAL
Qty: 90 TABLET | Refills: 2 | OUTPATIENT
Start: 2022-12-14

## 2022-12-14 RX ORDER — AMLODIPINE BESYLATE 10 MG/1
10 TABLET ORAL DAILY
Qty: 90 TABLET | Refills: 3 | Status: SHIPPED | OUTPATIENT
Start: 2022-12-14

## 2023-03-27 ENCOUNTER — HOSPITAL ENCOUNTER (OUTPATIENT)
Dept: RADIOLOGY | Facility: HOSPITAL | Age: 63
Discharge: HOME | End: 2023-03-27

## 2023-03-27 DIAGNOSIS — R91.8 MULTIPLE PULMONARY NODULES: ICD-10-CM

## 2023-04-01 ENCOUNTER — APPOINTMENT (OUTPATIENT)
Dept: LAB | Facility: HOSPITAL | Age: 63
End: 2023-04-01

## 2023-04-01 DIAGNOSIS — E11.29 TYPE 2 DIABETES MELLITUS WITH MICROALBUMINURIA, WITH LONG-TERM CURRENT USE OF INSULIN (HCC): ICD-10-CM

## 2023-04-01 DIAGNOSIS — R80.9 TYPE 2 DIABETES MELLITUS WITH MICROALBUMINURIA, WITH LONG-TERM CURRENT USE OF INSULIN (HCC): ICD-10-CM

## 2023-04-01 DIAGNOSIS — Z79.4 TYPE 2 DIABETES MELLITUS WITH MICROALBUMINURIA, WITH LONG-TERM CURRENT USE OF INSULIN (HCC): ICD-10-CM

## 2023-04-01 LAB
ANION GAP SERPL CALCULATED.3IONS-SCNC: 11 MMOL/L (ref 4–13)
BUN SERPL-MCNC: 10 MG/DL (ref 5–25)
CALCIUM SERPL-MCNC: 9.6 MG/DL (ref 8.4–10.2)
CHLORIDE SERPL-SCNC: 102 MMOL/L (ref 96–108)
CO2 SERPL-SCNC: 26 MMOL/L (ref 21–32)
CREAT SERPL-MCNC: 0.83 MG/DL (ref 0.6–1.3)
CREAT UR-MCNC: 54.3 MG/DL
GFR SERPL CREATININE-BSD FRML MDRD: 92 ML/MIN/1.73SQ M
GLUCOSE P FAST SERPL-MCNC: 123 MG/DL (ref 65–99)
MICROALBUMIN UR-MCNC: 267 MG/L (ref 0–20)
MICROALBUMIN/CREAT 24H UR: 492 MG/G CREATININE (ref 0–30)
POTASSIUM SERPL-SCNC: 3.6 MMOL/L (ref 3.5–5.3)
SODIUM SERPL-SCNC: 139 MMOL/L (ref 135–147)

## 2023-04-02 LAB
EST. AVERAGE GLUCOSE BLD GHB EST-MCNC: 169 MG/DL
HBA1C MFR BLD: 7.5 %

## 2023-04-23 NOTE — ASSESSMENT & PLAN NOTE
BP Readings from Last 3 Encounters:   04/11/23 143/90   12/06/22 130/76   07/08/22 132/84     Controlled, continue amlodipine 10 mg and lisinopril 40 mg  Patient would like to stop metoprolol, instructed patient to take every other day for two weeks then stop  Continue to monitor blood pressure at home

## 2023-04-23 NOTE — ASSESSMENT & PLAN NOTE
Repeat CT scan at 6 month interval with: Innumerable new groundglass pulmonary nodules measuring up to 5 mm  The differential diagnosis includes but is not limited to typical/atypical infection, hypersensitivity pneumonitis, respiratory bronchiolitis interstitial lung disease (RBILD), and desquamative interstitial pneumonia      He does have long standing smoking history, possible occupational exposures, no explicit symptoms   Consider pulmonology referral, but patient would like to defer for now

## 2023-04-23 NOTE — ASSESSMENT & PLAN NOTE
Lab Results   Component Value Date    HGBA1C 7 5 (H) 04/01/2023    HGBA1C 7 1 (H) 11/10/2022    HGBA1C 6 3 (H) 06/03/2022     Lab Results   Component Value Date    GLUF 123 (H) 04/01/2023    LDLCALC 27 06/03/2022    CREATININE 0 83 04/01/2023     Worsening A1C since stopping insulin and having been on Jardiance until about 6 weeks ago, so current A1c not reflective of therapy   Will continue current management with metformin 1000 mg twice daily and Jardiance 25 mg daily

## 2023-04-23 NOTE — PROGRESS NOTES
FAMILY MEDICINE PROGRESS NOTE    Date of Service: 23  Primary Care Provider:   Alexandra Alejandro MD       Name: Cielo Bose       : 1960       Age:63 y o  Sex: male      MRN: [id number]      Chief Complaint:Follow-up (5 mo, discuss blood work)       ASSESSMENT and PLAN:  Cielo Bose is a 70 y o  male with:     Problem List Items Addressed This Visit        Endocrine    Type 2 diabetes mellitus, with long-term current use of insulin [facility] - Primary     Lab Results   Component Value Date    HGBA1C 7 5 (H) 2023    HGBA1C 7 1 (H) 11/10/2022    HGBA1C 6 3 (H) 2022     Lab Results   Component Value Date    GLUF 123 (H) 2023    LDLCALC 27 2022    CREATININE 0 83 2023     Worsening A1C since stopping insulin and having been on Jardiance until about 6 weeks ago, so current A1c not reflective of therapy  Will continue current management with metformin 1000 mg twice daily and Jardiance 25 mg daily          Microalbuminuria due to type 2 diabetes mellitus ([facility] )     Persistent, though he had been off of Jardiance  Will continue to monitor, continue Jardiance and lisinopril             Respiratory    Multiple pulmonary nodules     Repeat CT scan at 6 month interval with: Innumerable new groundglass pulmonary nodules measuring up to 5 mm  The differential diagnosis includes but is not limited to typical/atypical infection, hypersensitivity pneumonitis, respiratory bronchiolitis interstitial lung disease (RBILD), and desquamative interstitial pneumonia      He does have long standing smoking history, possible occupational exposures, no explicit symptoms   Consider pulmonology referral, but patient would like to defer for now         AZAR (obstructive sleep apnea)       Cardiovascular and Mediastinum    Primary hypertension     BP Readings from Last 3 Encounters:   23 143/90   22 130/76   22 132/84     Controlled, continue amlodipine 10 mg,  lisinopril 40 mg, metoprolol tartrate to 25 mg daily         Thoracic aortic ectasia (HCC)     Noted on CT scan for lung cancer screening in October 2022 with fusiform ectasia of the ascending thoracic aorta measuring up to 41 mm (previously 39 mm in 2021)  Recommended 1 year follow-up             Genitourinary    Candidal balanitis     Treat with two weeks of clotrimazole, continue hydrocortisone as needed         Relevant Medications    clotrimazole (LOTRIMIN) 1 % cream       Other    Tobacco dependence     He wants to quit, would like to try patches         Relevant Medications    nicotine (NICODERM CQ) 21 mg/24 hr TD 24 hr patch    History of partial splenectomy    Hyperlipidemia     Lab Results   Component Value Date    HDL 49 06/03/2022    LDLCALC 27 06/03/2022    TRIG 113 06/03/2022     Controlled, continue atorvastatin 20 mg        Other Visit Diagnoses     Situational anxiety              SUBJECTIVE:  Laurie Isabel is a 70 y o  male who presents today with a chief complaint of Follow-up (5 mo, discuss blood work)  HPI     Diabetes  Insulin was stopped in July due to adequate glycemic control  He was continued on Jardiance 25 mg and metformin 1000 mg twice daily, however [facility] [place] had been cost prohibitive so he had been off this medication at his last visit, though resumed this about 6 weeks ago  Fasting blood sugar this morning was 119  Hypertension  He is on amlodipine 10 mg, lisinopril 40 mg, and metoprolol 25 mg daily  He reports normal home readings  He also reports that he has had rash, pruritis on penis for weeks  He tried cortisone on and off as well aloe which has helped some  There is a milky substance present  He continues to smoke, but had cut back  He now only smokes one cigarette when driving  He has tried multiple cessation methods in the past including Chantix and other things  He never tried the patch  He is interested in medical marijuana   He has some stressors at home 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 and would like something to help cope with stress  Review of Systems   Genitourinary: Negative for dysuria, genital sores, penile discharge, penile pain (rash, pruritis ), penile swelling and scrotal swelling  Psychiatric/Behavioral: Negative for dysphoric mood and sleep disturbance  The patient is not nervous/anxious  I have reviewed the patient's Past Medical History  Current Outpatient Medications:   •  amLODIPine (NORVASC) 10 mg tablet, Take 1 tablet (10 mg total) by mouth daily, Disp: 90 tablet, Rfl: 3  •  atorvastatin (LIPITOR) 20 mg tablet, Take 1 tablet (20 mg total) by mouth daily, Disp: 90 tablet, Rfl: 4  •  clotrimazole (LOTRIMIN) 1 % cream, Apply topically 2 (two) times a day for 14 days, Disp: 30 g, Rfl: 0  •  glucose blood test strip, Use 1 each daily TEST DAILY    PATIENT HAS ONE TOUCH VERIO DEVICE, Disp: 100 strip, Rfl: 5  •  Jardiance 25 MG TABS, Take 1 tablet (25 mg total) by mouth daily, Disp: 90 tablet, Rfl: 3  •  lisinopril (ZESTRIL) 40 mg tablet, Take 1 tablet (40 mg total) by mouth daily Ta, Disp: 90 tablet, Rfl: 3  •  metFORMIN (GLUCOPHAGE) 1000 MG tablet, Take 1 tablet (1,000 mg total) by mouth 2 (two) times a day, Disp: 180 tablet, Rfl: 3  •  metoprolol tartrate (LOPRESSOR) 50 mg tablet, Take 0 5 tablets (25 mg total) by mouth daily, Disp: 90 tablet, Rfl: 3  •  nicotine (NICODERM CQ) 21 mg/24 hr TD 24 hr patch, Place 1 patch on the skin over 24 hours every 24 hours, Disp: 28 patch, Rfl: 0    OBJECTIVE:  /78 (BP Location: Left arm, Patient Position: Sitting, Cuff Size: Standard)   Pulse 87   Temp (!) 97 3 °F (36 3 °C) (Tympanic)   Resp 16   Ht 5' 9\" (1 753 m)   Wt 86 6 kg (191 lb)   SpO2 96%   BMI 28 21 kg/m²    BP Readings from Last 3 Encounters:   04/24/23 130/78   04/11/23 143/90   12/06/22 130/76      Wt Readings from Last 3 Encounters:   04/24/23 86 6 kg (191 lb)   04/11/23 86 6 kg (191 lb)   12/06/22 89 4 kg (197 lb)      Physical " "Exam  Constitutional:       General: He is not in acute distress  Appearance: Normal appearance  He is not ill-appearing or toxic-appearing  HENT:      Head: Normocephalic and atraumatic  Right Ear: External ear normal       Left Ear: External ear normal       Nose: Nose normal       Mouth/Throat:      Mouth: Mucous membranes are moist    Eyes:      Extraocular Movements: Extraocular movements intact  Conjunctiva/sclera: Conjunctivae normal    Pulmonary:      Effort: Pulmonary effort is normal  No respiratory distress  Musculoskeletal:      Cervical back: Normal range of motion and neck supple  No rigidity  Skin:     Findings: No erythema or rash  Neurological:      General: No focal deficit present  Mental Status: He is alert and oriented to person, place, and time  Psychiatric:         Mood and Affect: Mood normal          Behavior: Behavior normal                 Return in about 3 months (around 7/24/2023) for Welcome AWV and diabetes follow-up   Mandi Conway MD    Note: Portions of the record have been created with voice recognition software  Occasional wrong word or \"sound a like\" substitutions may have occurred due to the inherent limitations of voice recognition software  Read the chart carefully and recognize, using context, where substitutions have occurred    "

## 2023-04-23 NOTE — ASSESSMENT & PLAN NOTE
Noted on CT scan for lung cancer screening in October 2022 with fusiform ectasia of the ascending thoracic aorta measuring up to 41 mm (previously 39 mm in 2021)      Recommended 1 year follow-up

## 2023-04-24 ENCOUNTER — OFFICE VISIT (OUTPATIENT)
Dept: FAMILY MEDICINE CLINIC | Facility: CLINIC | Age: 63
End: 2023-04-24

## 2023-04-24 VITALS
SYSTOLIC BLOOD PRESSURE: 130 MMHG | BODY MASS INDEX: 28.29 KG/M2 | OXYGEN SATURATION: 96 % | DIASTOLIC BLOOD PRESSURE: 78 MMHG | WEIGHT: 191 LBS | HEIGHT: 69 IN | TEMPERATURE: 97.3 F | RESPIRATION RATE: 16 BRPM | HEART RATE: 87 BPM

## 2023-04-24 DIAGNOSIS — F17.200 TOBACCO DEPENDENCE: ICD-10-CM

## 2023-04-24 DIAGNOSIS — Z90.81 HISTORY OF PARTIAL SPLENECTOMY: ICD-10-CM

## 2023-04-24 DIAGNOSIS — G47.33 OSA (OBSTRUCTIVE SLEEP APNEA): ICD-10-CM

## 2023-04-24 DIAGNOSIS — F41.8 SITUATIONAL ANXIETY: ICD-10-CM

## 2023-04-24 DIAGNOSIS — I10 PRIMARY HYPERTENSION: ICD-10-CM

## 2023-04-24 DIAGNOSIS — E78.2 MIXED HYPERLIPIDEMIA: ICD-10-CM

## 2023-04-24 DIAGNOSIS — R80.9 TYPE 2 DIABETES MELLITUS WITH MICROALBUMINURIA, WITH LONG-TERM CURRENT USE OF INSULIN (HCC): Primary | ICD-10-CM

## 2023-04-24 DIAGNOSIS — E11.29 MICROALBUMINURIA DUE TO TYPE 2 DIABETES MELLITUS (HCC): ICD-10-CM

## 2023-04-24 DIAGNOSIS — R80.9 MICROALBUMINURIA DUE TO TYPE 2 DIABETES MELLITUS (HCC): ICD-10-CM

## 2023-04-24 DIAGNOSIS — Z79.4 TYPE 2 DIABETES MELLITUS WITH MICROALBUMINURIA, WITH LONG-TERM CURRENT USE OF INSULIN (HCC): Primary | ICD-10-CM

## 2023-04-24 DIAGNOSIS — E11.29 TYPE 2 DIABETES MELLITUS WITH MICROALBUMINURIA, WITH LONG-TERM CURRENT USE OF INSULIN (HCC): Primary | ICD-10-CM

## 2023-04-24 DIAGNOSIS — B37.42 CANDIDAL BALANITIS: ICD-10-CM

## 2023-04-24 DIAGNOSIS — I77.810 THORACIC AORTIC ECTASIA (HCC): ICD-10-CM

## 2023-04-24 DIAGNOSIS — R91.8 MULTIPLE PULMONARY NODULES: ICD-10-CM

## 2023-04-24 RX ORDER — NICOTINE 21 MG/24HR
1 PATCH, TRANSDERMAL 24 HOURS TRANSDERMAL EVERY 24 HOURS
Qty: 28 PATCH | Refills: 0 | Status: SHIPPED | OUTPATIENT
Start: 2023-04-24

## 2023-04-24 RX ORDER — CLOTRIMAZOLE 1 %
CREAM (GRAM) TOPICAL 2 TIMES DAILY
Qty: 30 G | Refills: 0 | Status: SHIPPED | OUTPATIENT
Start: 2023-04-24 | End: 2023-05-08

## 2023-04-24 NOTE — ASSESSMENT & PLAN NOTE
Persistent, though he had been off of Jardiance  Will continue to monitor, continue Jardiance and lisinopril

## 2023-05-12 LAB
LEFT EYE DIABETIC RETINOPATHY: NORMAL
RIGHT EYE DIABETIC RETINOPATHY: NORMAL

## 2023-06-11 DIAGNOSIS — I10 ESSENTIAL HYPERTENSION: ICD-10-CM

## 2023-06-11 DIAGNOSIS — E11.9 TYPE 2 DIABETES MELLITUS WITHOUT COMPLICATION, WITH LONG-TERM CURRENT USE OF INSULIN (HCC): ICD-10-CM

## 2023-06-11 DIAGNOSIS — E78.5 HYPERLIPIDEMIA, UNSPECIFIED HYPERLIPIDEMIA TYPE: ICD-10-CM

## 2023-06-11 DIAGNOSIS — Z79.4 TYPE 2 DIABETES MELLITUS WITHOUT COMPLICATION, WITH LONG-TERM CURRENT USE OF INSULIN (HCC): ICD-10-CM

## 2023-06-12 RX ORDER — ATORVASTATIN CALCIUM 20 MG/1
TABLET, FILM COATED ORAL
Qty: 90 TABLET | Refills: 4 | Status: SHIPPED | OUTPATIENT
Start: 2023-06-12

## 2023-07-28 ENCOUNTER — OFFICE VISIT (OUTPATIENT)
Dept: FAMILY MEDICINE CLINIC | Facility: CLINIC | Age: 63
End: 2023-07-28
Payer: COMMERCIAL

## 2023-07-28 VITALS
WEIGHT: 197 LBS | HEIGHT: 69 IN | DIASTOLIC BLOOD PRESSURE: 82 MMHG | SYSTOLIC BLOOD PRESSURE: 120 MMHG | BODY MASS INDEX: 29.18 KG/M2 | OXYGEN SATURATION: 95 % | HEART RATE: 82 BPM

## 2023-07-28 DIAGNOSIS — R80.9 MICROALBUMINURIA DUE TO TYPE 2 DIABETES MELLITUS (HCC): ICD-10-CM

## 2023-07-28 DIAGNOSIS — Z00.00 MEDICARE ANNUAL WELLNESS VISIT, SUBSEQUENT: ICD-10-CM

## 2023-07-28 DIAGNOSIS — G47.33 OSA (OBSTRUCTIVE SLEEP APNEA): ICD-10-CM

## 2023-07-28 DIAGNOSIS — R80.9 TYPE 2 DIABETES MELLITUS WITH MICROALBUMINURIA, WITH LONG-TERM CURRENT USE OF INSULIN (HCC): ICD-10-CM

## 2023-07-28 DIAGNOSIS — Z79.4 TYPE 2 DIABETES MELLITUS WITH MICROALBUMINURIA, WITH LONG-TERM CURRENT USE OF INSULIN (HCC): ICD-10-CM

## 2023-07-28 DIAGNOSIS — R91.8 MULTIPLE PULMONARY NODULES: ICD-10-CM

## 2023-07-28 DIAGNOSIS — I10 PRIMARY HYPERTENSION: Primary | ICD-10-CM

## 2023-07-28 DIAGNOSIS — E11.29 MICROALBUMINURIA DUE TO TYPE 2 DIABETES MELLITUS (HCC): ICD-10-CM

## 2023-07-28 DIAGNOSIS — E78.2 MIXED HYPERLIPIDEMIA: ICD-10-CM

## 2023-07-28 DIAGNOSIS — E11.29 TYPE 2 DIABETES MELLITUS WITH MICROALBUMINURIA, WITH LONG-TERM CURRENT USE OF INSULIN (HCC): ICD-10-CM

## 2023-07-28 DIAGNOSIS — M31.30 GRANULOMATOSIS WITH POLYANGIITIS, UNSPECIFIED WHETHER RENAL INVOLVEMENT (HCC): ICD-10-CM

## 2023-07-28 LAB — SL AMB POCT HEMOGLOBIN AIC: 7.4 (ref ?–6.5)

## 2023-07-28 PROCEDURE — G0439 PPPS, SUBSEQ VISIT: HCPCS | Performed by: FAMILY MEDICINE

## 2023-07-28 PROCEDURE — 99214 OFFICE O/P EST MOD 30 MIN: CPT | Performed by: FAMILY MEDICINE

## 2023-07-28 PROCEDURE — 83036 HEMOGLOBIN GLYCOSYLATED A1C: CPT | Performed by: FAMILY MEDICINE

## 2023-07-28 RX ORDER — GLIMEPIRIDE 1 MG/1
1 TABLET ORAL
Qty: 90 TABLET | Refills: 1 | Status: SHIPPED | OUTPATIENT
Start: 2023-07-28 | End: 2024-01-24

## 2023-07-28 NOTE — PROGRESS NOTES
Assessment and Plan:     Problem List Items Addressed This Visit        Endocrine    Type 2 diabetes mellitus, with long-term current use of insulin (HCC)    Relevant Medications    glimepiride (AMARYL) 1 mg tablet    Other Relevant Orders    Albumin / creatinine urine ratio    Basic metabolic panel    Hemoglobin A1C    POCT hemoglobin A1c (Completed)    CBC and Platelet    Microalbuminuria due to type 2 diabetes mellitus (HCC)    Relevant Medications    glimepiride (AMARYL) 1 mg tablet       Respiratory    Multiple pulmonary nodules    AZAR (obstructive sleep apnea)       Cardiovascular and Mediastinum    Granulomatosis with polyangiitis ([address])    Primary hypertension - Primary       Other    Hyperlipidemia   Other Visit Diagnoses     Medicare annual wellness visit, subsequent                  Dr. Gautam Dorado- [facility]   - no retinopathy     - A1c- 7.4       Preventive health issues were discussed with patient, and age appropriate screening tests were ordered as noted in patient's After Visit Summary. Personalized health advice and appropriate referrals for health education or preventive services given if needed, as noted in patient's After Visit Summary. History of Present Illness:     Patient presents for a Medicare Wellness Visit    AWV     Pain in belly. LLQ on and off. No blood. No fevers. Patient Care Team:  Prema Cardoza MD as PCP - General (Family Medicine)  Caren Bonilla MD as PCP - PCP-[facility] ([facility])  Ifeoma Perdomo MD as PCP - [address] [place] [address] ([facility])     Review of Systems:     Review of Systems   Constitutional: Negative for fatigue and fever. HENT: Negative for sore throat. Eyes: Negative for visual disturbance. Respiratory: Negative for cough, chest tightness and shortness of breath. Cardiovascular: Negative for chest pain, palpitations and leg swelling. Gastrointestinal: Negative for abdominal pain, constipation, diarrhea and nausea.    Endocrine: Negative for cold intolerance and heat intolerance. Genitourinary: Negative for flank pain. Musculoskeletal: Negative for back pain and neck pain. Skin: Negative for rash. Neurological: Negative for headaches. Psychiatric/Behavioral: Negative for behavioral problems and confusion.         Problem List:     Patient Active Problem List   Diagnosis   • Multiple pulmonary nodules   • Granulomatosis with polyangiitis (HCC)   • Type 2 diabetes mellitus, with long-term current use of insulin ([address])   • Primary hypertension   • Overweight with body mass index (BMI) of 29 to 29.9 in adult   • Tobacco dependence   • History of partial splenectomy   • Recurrent boils   • Hyperlipidemia   • AZAR (obstructive sleep apnea)   • Posterior tibialis muscle dysfunction   • Skin lesion of right external ear   • Microalbuminuria due to type 2 diabetes mellitus ([address])   • Thoracic aortic ectasia (HCC)   • Candidal balanitis      Past Medical and Surgical History:     Past Medical History:   Diagnosis Date   • CPAP (continuous positive airway pressure) dependence    • Diabetes mellitus (HCC)    • GERD (gastroesophageal reflux disease)    • Hyperlipidemia    • Hypertension    • Septic arthritis of knee, right ([address]) 5/5/2021   • Sleep apnea    • Squamous cell carcinoma of leg, right      Past Surgical History:   Procedure Laterality Date   • CHOLECYSTECTOMY     • COLONOSCOPY     • FACIAL/NECK BIOPSY Right 2/10/2022    Procedure: EXCISION LESION RIGHT EAR LOBE;  Surgeon: Noni De La O MD;  Location:  MAIN OR;  Service: General   • HAND TENDON SURGERY     • KNEE ARTHROSCOPY      Knee, right   • SPLENECTOMY, PARTIAL  1968    accident   • TONSILLECTOMY     • WOUND DEBRIDEMENT Right 5/5/2021    Procedure: RIGHT KNEE ARTHROSCOPY W/IRRIGATION AND DEBRIDEMENT OF SEPTIC ARTHRITIS;  Surgeon: Matias Hare MD;  Location: [facility] MAIN OR;  Service: Orthopedics      Family History:     Family History   Problem Relation Age of Onset   • Ovarian cancer Mother • Kidney disease Mother    • Coronary artery disease Father    • Diabetes Father    • Pancreatic cancer Brother       Social History:     Social History     Socioeconomic History   • Marital status: Single     Spouse name: None   • Number of children: None   • Years of education: None   • Highest education level: None   Occupational History   • None   Tobacco Use   • Smoking status: Every Day     Packs/day: 1.00     Years: 48.00     Total pack years: 48.00     Types: Cigarettes   • Smokeless tobacco: Never   Vaping Use   • Vaping Use: Never used   Substance and Sexual Activity   • Alcohol use: Yes     Comment: occasional   • Drug use: Never   • Sexual activity: Yes     Partners: Female     Birth control/protection: None   Other Topics Concern   • None   Social History Narrative   • None     Social Determinants of Health     Financial Resource Strain: Not on file   Food Insecurity: Not on file   Transportation Needs: Not on file   Physical Activity: Not on file   Stress: Not on file   Social Connections: Not on file   Intimate Partner Violence: Not on file   Housing Stability: Not on file      Medications and Allergies:     Current Outpatient Medications   Medication Sig Dispense Refill   • amLODIPine (NORVASC) 10 mg tablet Take 1 tablet (10 mg total) by mouth daily 90 tablet 3   • atorvastatin (LIPITOR) 20 mg tablet TAKE 1 TABLET BY MOUTH EVERY DAY 90 tablet 4   • glimepiride (AMARYL) 1 mg tablet Take 1 tablet (1 mg total) by mouth daily with breakfast 90 tablet 1   • glucose blood test strip Use 1 each daily TEST DAILY .  PATIENT HAS ONE TOUCH VERIO DEVICE 100 strip 5   • Jardiance 25 MG TABS Take 1 tablet (25 mg total) by mouth daily 90 tablet 3   • lisinopril (ZESTRIL) 40 mg tablet Take 1 tablet (40 mg total) by mouth daily Ta 90 tablet 3   • metFORMIN (GLUCOPHAGE) 1000 MG tablet Take 1 tablet (1,000 mg total) by mouth 2 (two) times a day 180 tablet 3   • clotrimazole (LOTRIMIN) 1 % cream Apply topically 2 (two) times a day for 14 days 30 g 0     No current facility-administered medications for this visit. Allergies   Allergen Reactions   • Amoxicillin Rash   • Amoxicillin-Pot Clavulanate Rash      Immunizations:     Immunization History   Administered Date(s) Administered   • COVID-19 PFIZER VACCINE 0.3 ML IM 03/20/2021, 04/10/2021, 11/06/2021   • COVID-19 Pfizer Vac BIVALENT Rufus-sucrose 12 Yr+ IM (BOOSTER ONLY) 10/08/2022   • H1N1, All Formulations 11/02/2009   • INFLUENZA 09/29/2020   • Influenza Quadrivalent Preservative Free 3 years and older IM 11/06/2022   • Influenza, injectable, quadrivalent, preservative free 0.5 mL 09/29/2020   • Meningococcal B, OMV (Elysia Cao) 04/16/2019, 05/28/2019   • Meningococcal MCV4P 04/16/2019   • Pneumococcal Polysaccharide PPV23 05/28/2019, 06/19/2019      Health Maintenance:         Topic Date Due   • HIV Screening  Never done   • Lung Cancer Screening  03/27/2024   • Colorectal Cancer Screening  08/19/2024   • Hepatitis C Screening  Completed         Topic Date Due   • HIB Vaccine (1 of 1 - Risk 1-dose series) Never done   • Hepatitis A Vaccine (1 of 2 - Risk 2-dose series) Never done   • Hepatitis B Vaccine (1 of 3 - Risk 3-dose series) Never done   • Meningococcal ACWY Vaccine (2 - Risk 2-dose series) 06/11/2019   • Pneumococcal Vaccine: 65+ Years (3 - PCV) 06/19/2020   • COVID-19 Vaccine (5 - Pfizer series) 03/03/2023   • Influenza Vaccine (1) 09/01/2023      Medicare Screening Tests and Risk Assessments:     Garcia Damian is here for his Subsequent Wellness visit. Last Medicare Wellness visit information reviewed, patient interviewed, no change since last AWV. Health Risk Assessment:   Patient rates overall health as good. Patient feels that their physical health rating is same. Patient is satisfied with their life. Eyesight was rated as same. Hearing was rated as same. Patient feels that their emotional and mental health rating is same.  Patients states they are never, rarely angry. Patient states they are sometimes unusually tired/fatigued. Pain experienced in the last 7 days has been some. Patient's pain rating has been 4/10. Patient states that he has experienced no weight loss or gain in last 6 months. Depression Screening:   PHQ-2 Score: 0      Fall Risk Screening: In the past year, patient has experienced: no history of falling in past year      Home Safety:  Patient does not have trouble with stairs inside or outside of their home. Patient has working smoke alarms and has no working carbon monoxide detector. Home safety hazards include: none. Nutrition:   Current diet is Regular. Medications:   Patient is currently taking over-the-counter supplements. OTC medications include: see medication list. Patient is able to manage medications. Activities of Daily Living (ADLs)/Instrumental Activities of Daily Living (IADLs):   Walk and transfer into and out of bed and chair?: Yes  Dress and groom yourself?: Yes    Bathe or shower yourself?: Yes    Feed yourself?  Yes  Do your laundry/housekeeping?: Yes  Manage your money, pay your bills and track your expenses?: Yes  Make your own meals?: Yes    Do your own shopping?: Yes    Previous Hospitalizations:   Any hospitalizations or ED visits within the last 12 months?: No      Advance Care Planning:   Living will: No    Durable POA for healthcare: No    Advanced directive: No      PREVENTIVE SCREENINGS      Cardiovascular Screening:    General: Screening Not Indicated and History Lipid Disorder      Diabetes Screening:     General: Screening Not Indicated and History Diabetes      Colorectal Cancer Screening:     General: Screening Current      Abdominal Aortic Aneurysm (AAA) Screening:    Risk factors include: age between 70-77 yo and tobacco use        Lung Cancer Screening:     General: Screening Current      Hepatitis C Screening:    General: Screening Current    Screening, Brief Intervention, and Referral to Treatment (SBIRT)    Screening  Typical number of drinks in a day: 0  Typical number of drinks in a week: 1  Interpretation: Low risk drinking behavior. Single Item Drug Screening:  How often have you used an illegal drug (including marijuana) or a prescription medication for non-medical reasons in the past year? never    Single Item Drug Screen Score: 0  Interpretation: Negative screen for possible drug use disorder    No results found. Physical Exam:     /82 (BP Location: Left arm, Patient Position: Sitting, Cuff Size: Large)   Pulse 82   Ht 5' 9" (1.753 m)   Wt 89.4 kg (197 lb)   SpO2 95%   BMI 29.09 kg/m²     Physical Exam  Vitals and nursing note reviewed. Constitutional:       General: He is not in acute distress. Appearance: Normal appearance. He is well-developed. HENT:      Head: Normocephalic and atraumatic. Eyes:      Extraocular Movements: Extraocular movements intact. Conjunctiva/sclera: Conjunctivae normal.      Pupils: Pupils are equal, round, and reactive to light. Cardiovascular:      Rate and Rhythm: Normal rate and regular rhythm. Pulmonary:      Effort: Pulmonary effort is normal.      Breath sounds: Normal breath sounds. Abdominal:      General: Bowel sounds are normal.      Palpations: Abdomen is soft. Musculoskeletal:         General: Normal range of motion. Cervical back: Normal range of motion. Skin:     General: Skin is warm and dry. Neurological:      General: No focal deficit present. Mental Status: He is alert and oriented to person, place, and time.    Psychiatric:         Mood and Affect: Mood normal.         Speech: Speech normal.         Behavior: Behavior normal.          Yelitza Hansno MD

## 2023-07-28 NOTE — PATIENT INSTRUCTIONS
Medicare Preventive Visit Patient Instructions  Thank you for completing your Welcome to Medicare Visit or Medicare Annual Wellness Visit today. Your next wellness visit will be due in one year (7/28/2024). The screening/preventive services that you may require over the next 5-10 years are detailed below. Some tests may not apply to you based off risk factors and/or age. Screening tests ordered at today's visit but not completed yet may show as past due. Also, please note that scanned in results may not display below. Preventive Screenings:  Service Recommendations Previous Testing/Comments   Colorectal Cancer Screening  · Colonoscopy    · Fecal Occult Blood Test (FOBT)/Fecal Immunochemical Test (FIT)  · Fecal DNA/Cologuard Test  · Flexible Sigmoidoscopy Age: 43-73 years old   Colonoscopy: every 10 years (May be performed more frequently if at higher risk)  OR  FOBT/FIT: every 1 year  OR  Cologuard: every 3 years  OR  Sigmoidoscopy: every 5 years  Screening may be recommended earlier than age 39 if at higher risk for colorectal cancer. Also, an individualized decision between you and your healthcare provider will decide whether screening between the ages of 77-80 would be appropriate.  Colonoscopy: 08/20/2021  FOBT/FIT: Not on file  Cologuard: Not on file  Sigmoidoscopy: Not on file    Screening Current     Prostate Cancer Screening Individualized decision between patient and health care provider in men between ages of 53-66   Medicare will cover every 12 months beginning on the day after your 50th birthday PSA: No results in last 5 years           Hepatitis C Screening Once for adults born between 1945 and 1965  More frequently in patients at high risk for Hepatitis C Hep C Antibody: 06/03/2022    Screening Current   Diabetes Screening 1-2 times per year if you're at risk for diabetes or have pre-diabetes Fasting glucose: 123 mg/dL (4/1/2023)  A1C: 7.5 % (4/1/2023)  Screening Not Indicated  History Diabetes Cholesterol Screening Once every 5 years if you don't have a lipid disorder. May order more often based on risk factors. Lipid panel: 06/03/2022  Screening Not Indicated  History Lipid Disorder      Other Preventive Screenings Covered by Medicare:  1. Abdominal Aortic Aneurysm (AAA) Screening: covered once if your at risk. You're considered to be at risk if you have a family history of AAA or a male between the age of 70-76 who smoking at least 100 cigarettes in your lifetime. 2. Lung Cancer Screening: covers low dose CT scan once per year if you meet all of the following conditions: (1) Age 48-67; (2) No signs or symptoms of lung cancer; (3) Current smoker or have quit smoking within the last 15 years; (4) You have a tobacco smoking history of at least 20 pack years (packs per day x number of years you smoked); (5) You get a written order from a healthcare provider. 3. Glaucoma Screening: covered annually if you're considered high risk: (1) You have diabetes OR (2) Family history of glaucoma OR (3)  aged 48 and older OR (3)  American aged 72 and older  3. Osteoporosis Screening: covered every 2 years if you meet one of the following conditions: (1) Have a vertebral abnormality; (2) On glucocorticoid therapy for more than 3 months; (3) Have primary hyperparathyroidism; (4) On osteoporosis medications and need to assess response to drug therapy. 5. HIV Screening: covered annually if you're between the age of 14-79. Also covered annually if you are younger than 13 and older than 72 with risk factors for HIV infection. For pregnant patients, it is covered up to 3 times per pregnancy.     Immunizations:  Immunization Recommendations   Influenza Vaccine Annual influenza vaccination during flu season is recommended for all persons aged >= 6 months who do not have contraindications   Pneumococcal Vaccine   * Pneumococcal conjugate vaccine = PCV13 (Prevnar 13), PCV15 (Vaxneuvance), PCV20 (Prevnar 20)  * Pneumococcal polysaccharide vaccine = PPSV23 (Pneumovax) Adults 2364 years old: 1-3 doses may be recommended based on certain risk factors  Adults 72 years old: 1-2 doses may be recommended based off what pneumonia vaccine you previously received   Hepatitis B Vaccine 3 dose series if at intermediate or high risk (ex: diabetes, end stage renal disease, liver disease)   Tetanus (Td) Vaccine - COST NOT COVERED BY MEDICARE PART B Following completion of primary series, a booster dose should be given every 10 years to maintain immunity against tetanus. Td may also be given as tetanus wound prophylaxis. Tdap Vaccine - COST NOT COVERED BY MEDICARE PART B Recommended at least once for all adults. For pregnant patients, recommended with each pregnancy. Shingles Vaccine (Shingrix) - COST NOT COVERED BY MEDICARE PART B  2 shot series recommended in those aged 48 and above     Health Maintenance Due:      Topic Date Due   • HIV Screening  Never done   • Lung Cancer Screening  03/27/2024   • Colorectal Cancer Screening  08/19/2024   • Hepatitis C Screening  Completed     Immunizations Due:      Topic Date Due   • HIB Vaccine (1 of 1 - Risk 1-dose series) Never done   • Hepatitis A Vaccine (1 of 2 - Risk 2-dose series) Never done   • Hepatitis B Vaccine (1 of 3 - Risk 3-dose series) Never done   • Meningococcal ACWY Vaccine (2 - Risk 2-dose series) 06/11/2019   • Pneumococcal Vaccine: 65+ Years (3 - PCV) 06/19/2020   • COVID-19 Vaccine (5 - Pfizer series) 03/03/2023   • Influenza Vaccine (1) 09/01/2023     Advance Directives   What are advance directives? Advance directives are legal documents that state your wishes and plans for medical care. These plans are made ahead of time in case you lose your ability to make decisions for yourself. Advance directives can apply to any medical decision, such as the treatments you want, and if you want to donate organs. What are the types of advance directives?   There are many types of advance directives, and each state has rules about how to use them. You may choose a combination of any of the following:  · Living will: This is a written record of the treatment you want. You can also choose which treatments you do not want, which to limit, and which to stop at a certain time. This includes surgery, medicine, IV fluid, and tube feedings. · Durable power of  for healthcare [facility]): This is a written record that states who you want to make healthcare choices for you when you are unable to make them for yourself. This person, called a proxy, is usually a family member or a friend. You may choose more than 1 proxy. · Do not resuscitate (DNR) order:  A DNR order is used in case your heart stops beating or you stop breathing. It is a request not to have certain forms of treatment, such as CPR. A DNR order may be included in other types of advance directives. · Medical directive: This covers the care that you want if you are in a coma, near death, or unable to make decisions for yourself. You can list the treatments you want for each condition. Treatment may include pain medicine, surgery, blood transfusions, dialysis, IV or tube feedings, and a ventilator (breathing machine). · Values history: This document has questions about your views, beliefs, and how you feel and think about life. This information can help others choose the care that you would choose. Why are advance directives important? An advance directive helps you control your care. Although spoken wishes may be used, it is better to have your wishes written down. Spoken wishes can be misunderstood, or not followed. Treatments may be given even if you do not want them. An advance directive may make it easier for your family to make difficult choices about your care.    Cigarette Smoking and Your Health   Risks to your health if you smoke:  Nicotine and other chemicals found in tobacco damage every cell in your body. Even if you are a light smoker, you have an increased risk for cancer, heart disease, and lung disease. If you are pregnant or have diabetes, smoking increases your risk for complications. Benefits to your health if you stop smoking:   · You decrease respiratory symptoms such as coughing, wheezing, and shortness of breath. · You reduce your risk for cancers of the lung, mouth, throat, kidney, bladder, pancreas, stomach, and cervix. If you already have cancer, you increase the benefits of chemotherapy. You also reduce your risk for cancer returning or a second cancer from developing. · You reduce your risk for heart disease, blood clots, heart attack, and stroke. · You reduce your risk for lung infections, and diseases such as pneumonia, asthma, chronic bronchitis, and emphysema. · Your circulation improves. More oxygen can be delivered to your body. If you have diabetes, you lower your risk for complications, such as kidney, artery, and eye diseases. You also lower your risk for nerve damage. Nerve damage can lead to amputations, poor vision, and blindness. · You improve your body's ability to heal and to fight infections. For more information and support to stop smoking:   · [employer]  Phone: [phone]- [phone]  Web Address: www.besomebody.  Weight Management   Why it is important to manage your weight:  Being overweight increases your risk of health conditions such as heart disease, high blood pressure, type 2 diabetes, and certain types of cancer. It can also increase your risk for osteoarthritis, sleep apnea, and other respiratory problems. Aim for a slow, steady weight loss. Even a small amount of weight loss can lower your risk of health problems. How to lose weight safely:  A safe and healthy way to lose weight is to eat fewer calories and get regular exercise. You can lose up about 1 pound a week by decreasing the number of calories you eat by 500 calories each day.    Healthy meal plan for weight management:  A healthy meal plan includes a variety of foods, contains fewer calories, and helps you stay healthy. A healthy meal plan includes the following:  · Eat whole-grain foods more often. A healthy meal plan should contain fiber. Fiber is the part of grains, fruits, and vegetables that is not broken down by your body. Whole-grain foods are healthy and provide extra fiber in your diet. Some examples of whole-grain foods are whole-wheat breads and pastas, oatmeal, brown rice, and bulgur. · Eat a variety of vegetables every day. Include dark, leafy greens such as spinach, kale, sofie greens, and mustard greens. Eat yellow and orange vegetables such as carrots, sweet potatoes, and winter squash. · Eat a variety of fruits every day. Choose fresh or canned fruit (canned in its own juice or light syrup) instead of juice. Fruit juice has very little or no fiber. · Eat low-fat dairy foods. Drink fat-free (skim) milk or 1% milk. Eat fat-free yogurt and low-fat cottage cheese. Try low-fat cheeses such as mozzarella and other reduced-fat cheeses. · Choose meat and other protein foods that are low in fat. Choose beans or other legumes such as split peas or lentils. Choose fish, skinless poultry (chicken or turkey), or lean cuts of red meat (beef or pork). Before you cook meat or poultry, cut off any visible fat. · Use less fat and oil. Try baking foods instead of frying them. Add less fat, such as margarine, sour cream, regular salad dressing and mayonnaise to foods. Eat fewer high-fat foods. Some examples of high-fat foods include french fries, doughnuts, ice cream, and cakes. · Eat fewer sweets. Limit foods and drinks that are high in sugar. This includes candy, cookies, regular soda, and sweetened drinks. Exercise:  Exercise at least 30 minutes per day on most days of the week. Some examples of exercise include walking, biking, dancing, and swimming.  You can also fit in more physical activity by taking the stairs instead of the elevator or parking farther away from stores. Ask your healthcare provider about the best exercise plan for you. © Copyright [employer] 2018 Information is for End User's use only and may not be sold, redistributed or otherwise used for commercial purposes.  All illustrations and images included in CareNotes® are the copyrighted property of [employer] or [address] [place] [address]

## 2023-09-01 ENCOUNTER — CLINICAL SUPPORT (OUTPATIENT)
Dept: FAMILY MEDICINE CLINIC | Facility: CLINIC | Age: 63
End: 2023-09-01
Payer: COMMERCIAL

## 2023-09-01 DIAGNOSIS — Z23 ENCOUNTER FOR IMMUNIZATION: Primary | ICD-10-CM

## 2023-09-01 PROCEDURE — 90677 PCV20 VACCINE IM: CPT

## 2023-09-01 PROCEDURE — G0009 ADMIN PNEUMOCOCCAL VACCINE: HCPCS

## 2023-11-28 DIAGNOSIS — I10 PRIMARY HYPERTENSION: ICD-10-CM

## 2023-11-28 DIAGNOSIS — I10 ESSENTIAL HYPERTENSION: ICD-10-CM

## 2023-11-28 RX ORDER — LISINOPRIL 40 MG/1
40 TABLET ORAL DAILY
Qty: 90 TABLET | Refills: 1 | Status: SHIPPED | OUTPATIENT
Start: 2023-11-28

## 2023-11-28 RX ORDER — AMLODIPINE BESYLATE 10 MG/1
10 TABLET ORAL DAILY
Qty: 90 TABLET | Refills: 1 | Status: SHIPPED | OUTPATIENT
Start: 2023-11-28

## 2023-11-28 NOTE — TELEPHONE ENCOUNTER
----- Message from Santi Ramirez sent at 11/28/2023  9:26 AM EST -----  Regarding: Two refills  Contact: [phone]  Please send refills for lisinopril and amlodipine. Thank you.

## 2023-12-05 ENCOUNTER — PATIENT MESSAGE (OUTPATIENT)
Dept: FAMILY MEDICINE CLINIC | Facility: CLINIC | Age: 63
End: 2023-12-05

## 2023-12-05 ENCOUNTER — NURSE TRIAGE (OUTPATIENT)
Age: 63
End: 2023-12-05

## 2023-12-05 NOTE — PATIENT COMMUNICATION
Pt experiencing intermittent pain in groin, belly and lower back.   Wanted to see if needed extra testing prior to appt in Jan.

## 2023-12-05 NOTE — TELEPHONE ENCOUNTER
Patient needs an appointment for mentioned complaint . No available appts for the next two weeks. Attempted warm transfer without success. Please call patient with appointment and or advisement. This started a few months ago when he felt it was more in the abdomen however the last 3 weeks it has become more prevalent in the left groin and left lower back area.

## 2023-12-05 NOTE — TELEPHONE ENCOUNTER
Pt called back to say that he is covid positive. He would like a sooner appt. I tried to warm transfer to the office but there was no answer. Please, call pt to scheduled for covid positive asap. Thank you.

## 2023-12-05 NOTE — TELEPHONE ENCOUNTER
Reason for Disposition   Abdominal pain is a chronic symptom (recurrent or ongoing AND lasting > 4 weeks)    Answer Assessment - Initial Assessment Questions  1. LOCATION: "Where does it hurt?"       Left side groin  2. RADIATION: "Does the pain shoot anywhere else?" (e.g., chest, back)      Low back, left side   3. ONSET: "When did the pain begin?" (Minutes, hours or days ago)       2-3 weeks   4. SUDDEN: "Gradual or sudden onset?"      gradual  5. PATTERN "Does the pain come and go, or is it constant?"     - If constant: "Is it getting better, staying the same, or worsening?"       (Note: Constant means the pain never goes away completely; most serious pain is constant and it progresses)      - If intermittent: "How long does it last?" "Do you have pain now?"      (Note: Intermittent means the pain goes away completely between bouts)      intermittent  6. SEVERITY: "How bad is the pain?"  (e.g., Scale 1-10; mild, moderate, or severe)     - MILD (1-3): doesn't interfere with normal activities, abdomen soft and not tender to touch      - MODERATE (4-7): interferes with normal activities or awakens from sleep, tender to touch      - SEVERE (8-10): excruciating pain, doubled over, unable to do any normal activities        moderate  7. RECURRENT SYMPTOM: "Have you ever had this type of stomach pain before?" If Yes, ask: "When was the last time?" and "What happened that time?"       denies  8. CAUSE: "What do you think is causing the stomach pain?"      unknown  9. RELIEVING/AGGRAVATING FACTORS: "What makes it better or worse?" (e.g., movement, antacids, bowel movement)      Denies . Takes Ibuprofen prn  10.  OTHER SYMPTOMS: "Has there been any vomiting, diarrhea, constipation, or urine problems?"        denies    Protocols used: Abdominal Pain - Male-ADULT-[place]

## 2023-12-06 ENCOUNTER — TELEMEDICINE (OUTPATIENT)
Dept: FAMILY MEDICINE CLINIC | Facility: CLINIC | Age: 63
End: 2023-12-06
Payer: COMMERCIAL

## 2023-12-06 DIAGNOSIS — U07.1 COVID: Primary | ICD-10-CM

## 2023-12-06 PROCEDURE — 99213 OFFICE O/P EST LOW 20 MIN: CPT | Performed by: STUDENT IN AN ORGANIZED HEALTH CARE EDUCATION/TRAINING PROGRAM

## 2023-12-06 RX ORDER — NIRMATRELVIR AND RITONAVIR 300-100 MG
3 KIT ORAL 2 TIMES DAILY
Qty: 30 TABLET | Refills: 0 | Status: SHIPPED | OUTPATIENT
Start: 2023-12-06 | End: 2023-12-11

## 2023-12-06 NOTE — ASSESSMENT & PLAN NOTE
COVID home antigen test positive. At this time I have recommended social distancing from family members who are currently asymptomatic. I have recommended using a mask while in common areas in the household. Frequent hand washing and cleaning of commonly used surfaces has also been reinforced for the patient and all family members. Strict ED precautions have been given in the event patient develops worsening SOB, fevers, and decreased PO intake. Have also advised the following:   - Good hydration: drink plenty of fluids to prevent dehydration.  - Rest: refrain from any strenuous exercise. - Fever and pain control: Tylenol 1000 mg, every 8 hours as needed OR Ibuprofen 600 mg, one tablet every 6 hours as needed.    Have also recommended starting using Vitamin C supplementation at home

## 2023-12-06 NOTE — PROGRESS NOTES
COVID-19 Outpatient Progress Note    Assessment/Plan:    Problem List Items Addressed This Visit          Other    COVID - Primary     COVID home antigen test positive. At this time I have recommended social distancing from family members who are currently asymptomatic. I have recommended using a mask while in common areas in the household. Frequent hand washing and cleaning of commonly used surfaces has also been reinforced for the patient and all family members. Strict ED precautions have been given in the event patient develops worsening SOB, fevers, and decreased PO intake. Have also advised the following:   - Good hydration: drink plenty of fluids to prevent dehydration.  - Rest: refrain from any strenuous exercise. - Fever and pain control: Tylenol 1000 mg, every 8 hours as needed OR Ibuprofen 600 mg, one tablet every 6 hours as needed. Have also recommended starting using Vitamin C supplementation at home         Relevant Medications    nirmatrelvir & ritonavir (Paxlovid, 300/100,) tablet therapy pack    *Instructed patient to stop Lipitor while on Paxlovid. Disposition:     Discussed symptom directed medication options with patient. Patient meets criteria for Paxlovid and they have been counseled appropriately regarding risks, benefits, side effects, and alternative treatment options. After discussion, patient agrees to treatment. Possible side effects of Paxlovid? Possible side effects of Paxlovid are:  - Liver Problems. Notify us right away if you start to experience loss of appetite, yellowing of your skin and the whites of eyes (jaundice), dark-colored urine, pale colored stools and itchy skin, stomach area (abdominal) pain. - Resistance to HIV Medicines. If you have untreated HIV infection, Paxlovid may lead to some HIV medicines not working as well in the future.   - Other possible side effects include: altered sense of taste, diarrhea, high blood pressure, or muscle aches.    I have spent a total time of 20 minutes on the day of the encounter for this patient including discussing prognosis, risks and benefits of treatment options, instructions for management and patient and family education. Encounter provider: Suzanna Bernardo MD     Provider located at: [facility] [address]  [address]  [phone]     Recent Visits  No visits were found meeting these conditions. Showing recent visits within past 7 days and meeting all other requirements  Today's Visits  Date Type Provider Dept   12/06/23 Telemedicine Suzanna Bernardo MD  [facility]    Showing today's visits and meeting all other requirements  Future Appointments  No visits were found meeting these conditions. Showing future appointments within next 150 days and meeting all other requirements     This virtual check-in was done via telephone and he agrees to proceed. Patient agrees to participate in a virtual check in via telephone or video visit instead of presenting to the office to address urgent/immediate medical needs. Patient is aware this is a billable service. He acknowledged consent and understanding of privacy and security of the video platform. The patient has agreed to participate and understands they can discontinue the visit at any time. After connecting through Telephone, the patient was identified by name and date of birth. Nicolette Rivas was informed that this was a telemedicine visit and that the exam was being conducted confidentially over secure lines. My office door was closed. No one else was in the room. Nicolette Rivas acknowledged consent and understanding of privacy and security of the telemedicine visit. I informed the patient that I have reviewed his record in Epic and presented the opportunity for him to ask any questions regarding the visit today. The patient agreed to participate.     It was my intent to perform this visit via video technology but the patient was not able to do a video connection so the visit was completed via audio telephone only. Verification of patient location:  Patient is located in the following state in which I hold an active license: [place]    Subjective:   Jacki Molina is a 70 y.o. male who is concerned about COVID-19. Patient's symptoms include fever (tmax 100.8), chills, rhinorrhea, cough, myalgias and headache. Patient denies fatigue, congestion, sore throat, anosmia, loss of taste, shortness of breath, abdominal pain, nausea, vomiting and diarrhea. - Date of symptom onset: 12/4/2023      COVID-19 vaccination status: Fully vaccinated with booster    Exposure:   Contact with a person who is under investigation (PUI) for or who is positive for COVID-19 within the last 14 days?: No    Hospitalized recently for fever and/or lower respiratory symptoms?: No      Currently a healthcare worker that is involved in direct patient care?: No      Works in a special setting where the risk of COVID-19 transmission may be high? (this may include long-term care, correctional and [facility] facilities; homeless shelters; assisted-living facilities and group homes.): No      Resident in a special setting where the risk of COVID-19 transmission may be high? (this may include long-term care, correctional and [facility] facilities; homeless shelters; assisted-living facilities and group homes.): No      Patient tested positive on 12/5/23 with home antigen test.     Lab Results   Component Value Date    SARSCOV2 Negative 05/05/2021       Review of Systems   Constitutional:  Positive for chills and fever (tmax 100.8). Negative for fatigue. HENT:  Positive for rhinorrhea. Negative for congestion and sore throat. Respiratory:  Positive for cough. Negative for shortness of breath. Gastrointestinal:  Negative for abdominal pain, diarrhea, nausea and vomiting. Musculoskeletal:  Positive for myalgias.    Neurological:  Positive for headaches. Current Outpatient Medications on File Prior to Visit   Medication Sig    amLODIPine (NORVASC) 10 mg tablet Take 1 tablet (10 mg total) by mouth daily    lisinopril (ZESTRIL) 40 mg tablet Take 1 tablet (40 mg total) by mouth daily Ta    atorvastatin (LIPITOR) 20 mg tablet TAKE 1 TABLET BY MOUTH EVERY DAY    clotrimazole (LOTRIMIN) 1 % cream Apply topically 2 (two) times a day for 14 days    glimepiride (AMARYL) 1 mg tablet Take 1 tablet (1 mg total) by mouth daily with breakfast    glucose blood test strip Use 1 each daily TEST DAILY . PATIENT HAS ONE TOUCH VERIO DEVICE    Jardiance 25 MG TABS Take 1 tablet (25 mg total) by mouth daily    metFORMIN (GLUCOPHAGE) 1000 MG tablet Take 1 tablet (1,000 mg total) by mouth 2 (two) times a day       Objective: There were no vitals taken for this visit. Physical Exam  Pulmonary:      Effort: Pulmonary effort is normal.   Neurological:      Mental Status: He is alert and oriented to person, place, and time.    Psychiatric:         Behavior: Behavior normal.       Haim Franco MD

## 2023-12-07 ENCOUNTER — TELEPHONE (OUTPATIENT)
Age: 63
End: 2023-12-07

## 2023-12-07 NOTE — TELEPHONE ENCOUNTER
Questions post telemedicine visit 12/6   Did have diarrhea but overall reports feeling better post thrid dose of Paxlovid. RN did advise to hydrate and small soft meals.

## 2023-12-13 ENCOUNTER — APPOINTMENT (OUTPATIENT)
Dept: LAB | Facility: HOSPITAL | Age: 63
End: 2023-12-13
Attending: FAMILY MEDICINE
Payer: COMMERCIAL

## 2023-12-13 DIAGNOSIS — E11.29 TYPE 2 DIABETES MELLITUS WITH MICROALBUMINURIA, WITH LONG-TERM CURRENT USE OF INSULIN (HCC): ICD-10-CM

## 2023-12-13 DIAGNOSIS — R80.9 TYPE 2 DIABETES MELLITUS WITH MICROALBUMINURIA, WITH LONG-TERM CURRENT USE OF INSULIN (HCC): ICD-10-CM

## 2023-12-13 DIAGNOSIS — Z79.4 TYPE 2 DIABETES MELLITUS WITH MICROALBUMINURIA, WITH LONG-TERM CURRENT USE OF INSULIN (HCC): ICD-10-CM

## 2023-12-13 LAB
ANION GAP SERPL CALCULATED.3IONS-SCNC: 12 MMOL/L
BUN SERPL-MCNC: 11 MG/DL (ref 5–25)
CALCIUM SERPL-MCNC: 9.2 MG/DL (ref 8.4–10.2)
CHLORIDE SERPL-SCNC: 101 MMOL/L (ref 96–108)
CO2 SERPL-SCNC: 25 MMOL/L (ref 21–32)
CREAT SERPL-MCNC: 1.02 MG/DL (ref 0.6–1.3)
CREAT UR-MCNC: 77.9 MG/DL
ERYTHROCYTE [DISTWIDTH] IN BLOOD BY AUTOMATED COUNT: 15.9 % (ref 11.6–15.1)
EST. AVERAGE GLUCOSE BLD GHB EST-MCNC: 177 MG/DL
GFR SERPL CREATININE-BSD FRML MDRD: 76 ML/MIN/1.73SQ M
GLUCOSE P FAST SERPL-MCNC: 146 MG/DL (ref 65–99)
HBA1C MFR BLD: 7.8 %
HCT VFR BLD AUTO: 45 % (ref 36.5–49.3)
HGB BLD-MCNC: 14.7 G/DL (ref 12–17)
MCH RBC QN AUTO: 27.4 PG (ref 26.8–34.3)
MCHC RBC AUTO-ENTMCNC: 32.7 G/DL (ref 31.4–37.4)
MCV RBC AUTO: 84 FL (ref 82–98)
MICROALBUMIN UR-MCNC: 510.5 MG/L
MICROALBUMIN/CREAT 24H UR: 655 MG/G CREATININE (ref 0–30)
PLATELET # BLD AUTO: 298 THOUSANDS/UL (ref 149–390)
PMV BLD AUTO: 9.5 FL (ref 8.9–12.7)
POTASSIUM SERPL-SCNC: 3.6 MMOL/L (ref 3.5–5.3)
RBC # BLD AUTO: 5.37 MILLION/UL (ref 3.88–5.62)
SODIUM SERPL-SCNC: 138 MMOL/L (ref 135–147)
WBC # BLD AUTO: 8.07 THOUSAND/UL (ref 4.31–10.16)

## 2023-12-13 PROCEDURE — 82570 ASSAY OF URINE CREATININE: CPT

## 2023-12-13 PROCEDURE — 82043 UR ALBUMIN QUANTITATIVE: CPT

## 2023-12-13 PROCEDURE — 36415 COLL VENOUS BLD VENIPUNCTURE: CPT

## 2023-12-13 PROCEDURE — 80048 BASIC METABOLIC PNL TOTAL CA: CPT

## 2023-12-13 PROCEDURE — 83036 HEMOGLOBIN GLYCOSYLATED A1C: CPT

## 2023-12-13 PROCEDURE — 85027 COMPLETE CBC AUTOMATED: CPT

## 2023-12-21 ENCOUNTER — RA CDI HCC (OUTPATIENT)
Dept: OTHER | Facility: HOSPITAL | Age: 63
End: 2023-12-21

## 2023-12-21 ENCOUNTER — OFFICE VISIT (OUTPATIENT)
Dept: FAMILY MEDICINE CLINIC | Facility: CLINIC | Age: 63
End: 2023-12-21
Payer: COMMERCIAL

## 2023-12-21 VITALS
OXYGEN SATURATION: 95 % | SYSTOLIC BLOOD PRESSURE: 138 MMHG | HEIGHT: 69 IN | RESPIRATION RATE: 16 BRPM | WEIGHT: 205 LBS | HEART RATE: 94 BPM | DIASTOLIC BLOOD PRESSURE: 86 MMHG | BODY MASS INDEX: 30.36 KG/M2 | TEMPERATURE: 96.1 F

## 2023-12-21 DIAGNOSIS — E11.29 TYPE 2 DIABETES MELLITUS WITH MICROALBUMINURIA, WITH LONG-TERM CURRENT USE OF INSULIN (HCC): Primary | ICD-10-CM

## 2023-12-21 DIAGNOSIS — Z79.4 TYPE 2 DIABETES MELLITUS WITH MICROALBUMINURIA, WITH LONG-TERM CURRENT USE OF INSULIN (HCC): Primary | ICD-10-CM

## 2023-12-21 DIAGNOSIS — R80.9 TYPE 2 DIABETES MELLITUS WITH MICROALBUMINURIA, WITH LONG-TERM CURRENT USE OF INSULIN (HCC): Primary | ICD-10-CM

## 2023-12-21 DIAGNOSIS — R80.9 MICROALBUMINURIA DUE TO TYPE 2 DIABETES MELLITUS: ICD-10-CM

## 2023-12-21 DIAGNOSIS — E11.29 MICROALBUMINURIA DUE TO TYPE 2 DIABETES MELLITUS: ICD-10-CM

## 2023-12-21 DIAGNOSIS — I10 PRIMARY HYPERTENSION: ICD-10-CM

## 2023-12-21 DIAGNOSIS — L30.4 INTERTRIGO: ICD-10-CM

## 2023-12-21 LAB
LEFT EYE DIABETIC RETINOPATHY: NORMAL
LEFT EYE IMAGE QUALITY: NORMAL
LEFT EYE MACULAR EDEMA: NORMAL
LEFT EYE OTHER RETINOPATHY: NORMAL
RIGHT EYE DIABETIC RETINOPATHY: NORMAL
RIGHT EYE IMAGE QUALITY: NORMAL
RIGHT EYE MACULAR EDEMA: NORMAL
RIGHT EYE OTHER RETINOPATHY: NORMAL
SEVERITY (EYE EXAM): NORMAL

## 2023-12-21 PROCEDURE — 99214 OFFICE O/P EST MOD 30 MIN: CPT | Performed by: FAMILY MEDICINE

## 2023-12-21 RX ORDER — NYSTATIN 100000 [USP'U]/G
POWDER TOPICAL 4 TIMES DAILY
Qty: 60 G | Refills: 1 | Status: SHIPPED | OUTPATIENT
Start: 2023-12-21

## 2023-12-21 RX ORDER — GLIMEPIRIDE 4 MG/1
4 TABLET ORAL
Qty: 90 TABLET | Refills: 1 | Status: SHIPPED | OUTPATIENT
Start: 2023-12-21 | End: 2024-06-18

## 2023-12-21 NOTE — PROGRESS NOTES
Name: Dawit Winchester      : 1960      MRN: [id number]  Encounter Provider: Nik Gutierrez MD  Encounter Date: 2023   Encounter department: [facility]    Assessment & Plan     1. Type 2 diabetes mellitus with microalbuminuria, with long-term current use of insulin ([facility])  Assessment & Plan:    Lab Results   Component Value Date    HGBA1C 7.8 (H) 2023   Blood sugars are still poorly controlled.  Emphasis importance of appropriate diet and exercise.  Continue metformin 1000 mg twice daily.  Continue Jardiance 25 mg daily.  Increase glimepiride to 4 mg daily.  Obtain lab work and follow-up in 3 months    Orders:  -     glimepiride (AMARYL) 4 mg tablet; Take 1 tablet (4 mg total) by mouth daily with breakfast  -     Albumin / creatinine urine ratio; Future; Expected date: 2023  -     Comprehensive metabolic panel; Future; Expected date: 2023  -     Hemoglobin A1C; Future; Expected date: 2023  -     IRIS Diabetic eye exam    2. Microalbuminuria due to type 2 diabetes mellitus   Assessment & Plan:    Lab Results   Component Value Date    HGBA1C 7.8 (H) 2023   Persistent.  Continue lisinopril and Jardiance.  Repeat labs      3. Intertrigo  Assessment & Plan:  Nystatin prescribed.  Apply topically 4 times a day.    Orders:  -     nystatin (MYCOSTATIN) powder; Apply topically 4 (four) times a day    4. Primary hypertension  Assessment & Plan:  Blood Pressure: 138/86  Blood pressure stable.  Continue lisinopril 40 mg daily.  Continue amlodipine 10 mg daily.      Orders listed above  Follow-up in 3 months for diabetes check  Follow-up in office if left lower quadrant pain returns.  Abdomen is nontender on examination       Subjective          Patient reports red itchy irritation in his groin.  Was using an over-the-counter antifungal which started to help.  He had left lower quadrant pain in the belly but this is resolved.  He endorses a poor diet with the  "holidays.  He recently got over COVID.        Review of Systems   Constitutional:  Negative for activity change, fatigue and fever.   Eyes:  Negative for visual disturbance.   Respiratory:  Negative for shortness of breath.    Cardiovascular:  Negative for chest pain.   Gastrointestinal:  Negative for abdominal pain, constipation, diarrhea and nausea.   Endocrine: Negative for cold intolerance and heat intolerance.   Musculoskeletal:  Negative for back pain.   Skin:  Positive for rash.   Neurological:  Negative for headaches.   Psychiatric/Behavioral:  Negative for confusion.        Current Outpatient Medications on File Prior to Visit   Medication Sig   • amLODIPine (NORVASC) 10 mg tablet Take 1 tablet (10 mg total) by mouth daily   • atorvastatin (LIPITOR) 20 mg tablet TAKE 1 TABLET BY MOUTH EVERY DAY   • glucose blood test strip Use 1 each daily TEST DAILY . PATIENT HAS ONE TOUCH VERIO DEVICE   • Jardiance 25 MG TABS Take 1 tablet (25 mg total) by mouth daily   • lisinopril (ZESTRIL) 40 mg tablet Take 1 tablet (40 mg total) by mouth daily Ta   • metFORMIN (GLUCOPHAGE) 1000 MG tablet TAKE 1 TABLET BY MOUTH TWICE A DAY   • [DISCONTINUED] glimepiride (AMARYL) 1 mg tablet Take 1 tablet (1 mg total) by mouth daily with breakfast   • clotrimazole (LOTRIMIN) 1 % cream Apply topically 2 (two) times a day for 14 days (Patient not taking: Reported on 12/21/2023)       Objective     /86 (BP Location: Left arm, Patient Position: Sitting, Cuff Size: Large)   Pulse 94   Temp (!) 96.1 °F (35.6 °C)   Resp 16   Ht 5' 9\" (1.753 m)   Wt 93 kg (205 lb)   SpO2 95%   BMI 30.27 kg/m²     Physical Exam  Vitals and nursing note reviewed.   Constitutional:       Appearance: He is well-developed.   HENT:      Head: Normocephalic and atraumatic.   Cardiovascular:      Rate and Rhythm: Normal rate and regular rhythm.   Pulmonary:      Effort: Pulmonary effort is normal.      Breath sounds: Normal breath sounds.   Skin:     " Findings: Erythema and rash present.   Neurological:      General: No focal deficit present.      Mental Status: He is alert.   Psychiatric:         Mood and Affect: Mood normal.         Behavior: Behavior normal.       Nik Gutierrez MD

## 2023-12-21 NOTE — ASSESSMENT & PLAN NOTE
Lab Results   Component Value Date    HGBA1C 7.8 (H) 12/13/2023   Blood sugars are still poorly controlled.  Emphasis importance of appropriate diet and exercise.  Continue metformin 1000 mg twice daily.  Continue Jardiance 25 mg daily.  Increase glimepiride to 4 mg daily.  Obtain lab work and follow-up in 3 months

## 2023-12-21 NOTE — ASSESSMENT & PLAN NOTE
Blood Pressure: 138/86  Blood pressure stable.  Continue lisinopril 40 mg daily.  Continue amlodipine 10 mg daily.

## 2023-12-21 NOTE — ASSESSMENT & PLAN NOTE
Lab Results   Component Value Date    HGBA1C 7.8 (H) 12/13/2023   Persistent.  Continue lisinopril and Jardiance.  Repeat labs

## 2023-12-21 NOTE — PROGRESS NOTES
HCC coding opportunities    E11.65, E11.42     Chart Reviewed number of suggestions sent to Provider: 2     Patients Insurance     Medicare Insurance: [employer]age

## 2024-01-04 ENCOUNTER — TELEPHONE (OUTPATIENT)
Dept: PULMONOLOGY | Facility: CLINIC | Age: 64
End: 2024-01-04

## 2024-01-04 NOTE — TELEPHONE ENCOUNTER
Kelsey from [facility] called [facility] requesting patient's chart notes for 2023 to be sent to fax: [phone]. Call back with any questions [phone] opt. 1

## 2024-02-01 ENCOUNTER — OFFICE VISIT (OUTPATIENT)
Dept: PULMONOLOGY | Facility: CLINIC | Age: 64
End: 2024-02-01
Payer: COMMERCIAL

## 2024-02-01 VITALS
HEIGHT: 69 IN | HEART RATE: 101 BPM | TEMPERATURE: 99.3 F | DIASTOLIC BLOOD PRESSURE: 86 MMHG | WEIGHT: 203.6 LBS | BODY MASS INDEX: 30.16 KG/M2 | OXYGEN SATURATION: 97 % | SYSTOLIC BLOOD PRESSURE: 124 MMHG

## 2024-02-01 DIAGNOSIS — R91.8 MULTIPLE PULMONARY NODULES: ICD-10-CM

## 2024-02-01 DIAGNOSIS — G47.33 OSA (OBSTRUCTIVE SLEEP APNEA): Primary | ICD-10-CM

## 2024-02-01 DIAGNOSIS — Z87.891 PERSONAL HISTORY OF TOBACCO USE: ICD-10-CM

## 2024-02-01 DIAGNOSIS — U07.1 COVID: ICD-10-CM

## 2024-02-01 PROBLEM — F17.200 TOBACCO DEPENDENCE: Status: RESOLVED | Noted: 2021-01-13 | Resolved: 2024-02-01

## 2024-02-01 PROCEDURE — 99213 OFFICE O/P EST LOW 20 MIN: CPT | Performed by: INTERNAL MEDICINE

## 2024-02-01 NOTE — ASSESSMENT & PLAN NOTE
CPAP therapy at least 15 years.  Symptomatic with regard to sleep disorders while using this device.  Patient states that he uses this device nightly all night.  We have never been able to get formal compliance from his machine and we will call his DME provider to see if this can be corrected.

## 2024-02-01 NOTE — ASSESSMENT & PLAN NOTE
Patient stopped smoking in April 2023.  Last CT scan demonstrated multiple nodules which are stable and likely benign disease.  One of the proposed diagnoses was respiratory bronchiolitis with interstitial lung disease which is a smoking-related condition that may improve now that he is tobacco free.  Repeat lung cancer screening due in March 2024 and should continue for another 14 years after that.

## 2024-02-01 NOTE — PROGRESS NOTES
Assessment/Plan:    AZAR (obstructive sleep apnea)  CPAP therapy at least 15 years.  Symptomatic with regard to sleep disorders while using this device.  Patient states that he uses this device nightly all night.  We have never been able to get formal compliance from his machine and we will call his DME provider to see if this can be corrected.    Personal history of tobacco use  Patient stopped smoking in April 2023.  Last CT scan demonstrated multiple nodules which are stable and likely benign disease.  One of the proposed diagnoses was respiratory bronchiolitis with interstitial lung disease which is a smoking-related condition that may improve now that he is tobacco free.  Repeat lung cancer screening due in March 2024 and should continue for another 14 years after that.    Multiple pulmonary nodules  See comments under personal history of tobacco use    COVID  December 2023.  Now asymptomatic.     Diagnoses and all orders for this visit:    AZAR (obstructive sleep apnea)  -     PAP DME Resupply/Reorder    Personal history of tobacco use  -     CT lung screening program; Future    Multiple pulmonary nodules    COVID        CPAP compliance obtained after this patient left the office.  It is excellent with 98% usage of CPAP in the last 90 days and residual AHI of 3.2 events per hour of CPAP use.  Subjective:      Patient ID: Dawit Winchester is a 63 y.o. male.    This patient returns for reevaluation of obstructive sleep apnea and tobacco use.  He also has multiple likely benign nodules on recent CT scans of chest.  Patient stopped smoking in 2014.  He denies symptoms of dyspnea, cough and sputum or wheezing.  He last had a CT scan of chest in March 2023 and still needs screening studies for another 15 years.  With regard to sleep apnea uses a CPAP on a nightly basis using nasal pillows.  For some reason I have never been able to get compliance for his machine.  We will contact his DME provider to see if this can be  "accomplished.  Patient is asymptomatic with regard to sleep disorders while using this device.  Replacement equipment ordered.        The following portions of the patient's history were reviewed and updated as appropriate: allergies, current medications, past family history, past medical history, past social history, past surgical history and problem list.    Review of Systems   Constitutional:  Positive for unexpected weight change. Negative for activity change.        6 pound gain in the last 14 months.   Respiratory:  Positive for apnea. Negative for cough, shortness of breath and wheezing.    Cardiovascular:  Negative for chest pain, palpitations and leg swelling.   Musculoskeletal:  Positive for arthralgias.        Bilateral knee pain   Skin:  Positive for color change.        Ecchymosis right hand   Allergic/Immunologic: Negative for immunocompromised state.         Objective:      /86 (BP Location: Left arm, Patient Position: Sitting, Cuff Size: Standard)   Pulse 101   Temp 99.3 °F (37.4 °C) (Tympanic)   Ht 5' 9\" (1.753 m)   Wt 92.4 kg (203 lb 9.6 oz)   SpO2 97%   BMI 30.07 kg/m²          Physical Exam  Vitals reviewed.   Constitutional:       General: He is not in acute distress.     Appearance: He is normal weight. He is not ill-appearing.   Cardiovascular:      Rate and Rhythm: Normal rate and regular rhythm.      Pulses:           Radial pulses are 2+ on the right side and 2+ on the left side.      Heart sounds: Normal heart sounds.      Comments: Looks well but has borderline tachycardia.  Pulmonary:      Effort: Pulmonary effort is normal.      Breath sounds: Normal breath sounds. No wheezing or rhonchi.   Musculoskeletal:         General: No swelling.      Cervical back: No rigidity or tenderness.      Right lower leg: No edema.      Left lower leg: No edema.   Lymphadenopathy:      Cervical: No cervical adenopathy.   Skin:     General: Skin is warm and dry.   Neurological:      Mental " Status: He is alert and oriented to person, place, and time.   Psychiatric:         Behavior: Behavior normal.

## 2024-02-14 LAB

## 2024-02-27 DIAGNOSIS — Z87.891 PERSONAL HISTORY OF TOBACCO USE: Primary | ICD-10-CM

## 2024-03-01 ENCOUNTER — APPOINTMENT (OUTPATIENT)
Dept: LAB | Facility: HOSPITAL | Age: 64
End: 2024-03-01
Payer: COMMERCIAL

## 2024-03-01 DIAGNOSIS — E11.29 TYPE 2 DIABETES MELLITUS WITH MICROALBUMINURIA, WITH LONG-TERM CURRENT USE OF INSULIN (HCC): ICD-10-CM

## 2024-03-01 DIAGNOSIS — R80.9 TYPE 2 DIABETES MELLITUS WITH MICROALBUMINURIA, WITH LONG-TERM CURRENT USE OF INSULIN (HCC): ICD-10-CM

## 2024-03-01 DIAGNOSIS — Z79.4 TYPE 2 DIABETES MELLITUS WITH MICROALBUMINURIA, WITH LONG-TERM CURRENT USE OF INSULIN (HCC): ICD-10-CM

## 2024-03-01 LAB
ALBUMIN SERPL BCP-MCNC: 4.2 G/DL (ref 3.5–5)
ALP SERPL-CCNC: 55 U/L (ref 34–104)
ALT SERPL W P-5'-P-CCNC: 29 U/L (ref 7–52)
ANION GAP SERPL CALCULATED.3IONS-SCNC: 10 MMOL/L
AST SERPL W P-5'-P-CCNC: 25 U/L (ref 13–39)
BILIRUB SERPL-MCNC: 0.52 MG/DL (ref 0.2–1)
BUN SERPL-MCNC: 16 MG/DL (ref 5–25)
CALCIUM SERPL-MCNC: 9.5 MG/DL (ref 8.4–10.2)
CHLORIDE SERPL-SCNC: 103 MMOL/L (ref 96–108)
CO2 SERPL-SCNC: 27 MMOL/L (ref 21–32)
CREAT SERPL-MCNC: 1.21 MG/DL (ref 0.6–1.3)
CREAT UR-MCNC: 97.9 MG/DL
EST. AVERAGE GLUCOSE BLD GHB EST-MCNC: 157 MG/DL
GFR SERPL CREATININE-BSD FRML MDRD: 62 ML/MIN/1.73SQ M
GLUCOSE P FAST SERPL-MCNC: 136 MG/DL (ref 65–99)
HBA1C MFR BLD: 7.1 %
MICROALBUMIN UR-MCNC: 856.5 MG/L
MICROALBUMIN/CREAT 24H UR: 875 MG/G CREATININE (ref 0–30)
POTASSIUM SERPL-SCNC: 3.7 MMOL/L (ref 3.5–5.3)
PROT SERPL-MCNC: 7.7 G/DL (ref 6.4–8.4)
SODIUM SERPL-SCNC: 140 MMOL/L (ref 135–147)

## 2024-03-01 PROCEDURE — 82570 ASSAY OF URINE CREATININE: CPT

## 2024-03-01 PROCEDURE — 36415 COLL VENOUS BLD VENIPUNCTURE: CPT

## 2024-03-01 PROCEDURE — 80053 COMPREHEN METABOLIC PANEL: CPT

## 2024-03-01 PROCEDURE — 83036 HEMOGLOBIN GLYCOSYLATED A1C: CPT

## 2024-03-01 PROCEDURE — 82043 UR ALBUMIN QUANTITATIVE: CPT

## 2024-03-03 DIAGNOSIS — Z79.4 TYPE 2 DIABETES MELLITUS WITHOUT COMPLICATION, WITH LONG-TERM CURRENT USE OF INSULIN (HCC): ICD-10-CM

## 2024-03-03 DIAGNOSIS — E11.9 TYPE 2 DIABETES MELLITUS WITHOUT COMPLICATION, WITH LONG-TERM CURRENT USE OF INSULIN (HCC): ICD-10-CM

## 2024-03-04 RX ORDER — BLOOD SUGAR DIAGNOSTIC
STRIP MISCELLANEOUS
Qty: 100 STRIP | Refills: 1 | Status: SHIPPED | OUTPATIENT
Start: 2024-03-04

## 2024-03-18 DIAGNOSIS — Z79.4 TYPE 2 DIABETES MELLITUS WITHOUT COMPLICATION, WITH LONG-TERM CURRENT USE OF INSULIN (HCC): ICD-10-CM

## 2024-03-18 DIAGNOSIS — E11.9 TYPE 2 DIABETES MELLITUS WITHOUT COMPLICATION, WITH LONG-TERM CURRENT USE OF INSULIN (HCC): ICD-10-CM

## 2024-03-18 RX ORDER — EMPAGLIFLOZIN 25 MG/1
25 TABLET, FILM COATED ORAL DAILY
Qty: 90 TABLET | Refills: 1 | Status: SHIPPED | OUTPATIENT
Start: 2024-03-18

## 2024-03-19 ENCOUNTER — RA CDI HCC (OUTPATIENT)
Dept: OTHER | Facility: HOSPITAL | Age: 64
End: 2024-03-19

## 2024-03-19 PROBLEM — E11.29 MICROALBUMINURIA DUE TO TYPE 2 DIABETES MELLITUS: Status: ACTIVE | Noted: 2021-01-13

## 2024-03-19 PROBLEM — R80.9 MICROALBUMINURIA DUE TO TYPE 2 DIABETES MELLITUS: Status: ACTIVE | Noted: 2021-01-13

## 2024-03-21 ENCOUNTER — OFFICE VISIT (OUTPATIENT)
Dept: FAMILY MEDICINE CLINIC | Facility: CLINIC | Age: 64
End: 2024-03-21
Payer: COMMERCIAL

## 2024-03-21 VITALS
BODY MASS INDEX: 30.81 KG/M2 | HEIGHT: 69 IN | RESPIRATION RATE: 18 BRPM | SYSTOLIC BLOOD PRESSURE: 130 MMHG | WEIGHT: 208 LBS | HEART RATE: 97 BPM | TEMPERATURE: 97.9 F | OXYGEN SATURATION: 97 % | DIASTOLIC BLOOD PRESSURE: 76 MMHG

## 2024-03-21 DIAGNOSIS — E11.29 PERSISTENT PROTEINURIA ASSOCIATED WITH TYPE 2 DIABETES MELLITUS  (HCC): ICD-10-CM

## 2024-03-21 DIAGNOSIS — E11.29 MICROALBUMINURIA DUE TO TYPE 2 DIABETES MELLITUS  (HCC): Primary | ICD-10-CM

## 2024-03-21 DIAGNOSIS — M31.30 GRANULOMATOSIS WITH POLYANGIITIS, UNSPECIFIED WHETHER RENAL INVOLVEMENT (HCC): ICD-10-CM

## 2024-03-21 DIAGNOSIS — R80.9 MICROALBUMINURIA DUE TO TYPE 2 DIABETES MELLITUS  (HCC): Primary | ICD-10-CM

## 2024-03-21 DIAGNOSIS — I77.810 THORACIC AORTIC ECTASIA (HCC): ICD-10-CM

## 2024-03-21 DIAGNOSIS — R80.9 PERSISTENT PROTEINURIA ASSOCIATED WITH TYPE 2 DIABETES MELLITUS  (HCC): ICD-10-CM

## 2024-03-21 DIAGNOSIS — E78.2 MIXED HYPERLIPIDEMIA: ICD-10-CM

## 2024-03-21 PROCEDURE — 99214 OFFICE O/P EST MOD 30 MIN: CPT | Performed by: FAMILY MEDICINE

## 2024-03-21 PROCEDURE — G2211 COMPLEX E/M VISIT ADD ON: HCPCS | Performed by: FAMILY MEDICINE

## 2024-03-21 NOTE — PROGRESS NOTES
Name: Dawit Winchester      : 1960      MRN: [id number]  Encounter Provider: Nik Gutierrez MD  Encounter Date: 3/21/2024   Encounter department: [facility]    Assessment & Plan     1. Microalbuminuria due to type 2 diabetes mellitus   Assessment & Plan:    Lab Results   Component Value Date    HGBA1C 7.1 (H) 2024     Orders:  -     Ambulatory Referral to Nephrology; Future    2. Granulomatosis with polyangiitis, unspecified whether renal involvement (HCC)  -     Ambulatory Referral to Nephrology; Future    3. Persistent proteinuria associated with type 2 diabetes mellitus   -     Protein electrophoresis, urine; Future    4. Thoracic aortic ectasia (HCC)  Assessment & Plan:  Continue to monitor with CT lung cancer screening      5. Mixed hyperlipidemia      Patient continues to have worsening proteinuria.  This is despite being on Jardiance, lisinopril, metformin.  He has a diagnosis of granulomatosis with polyangiitis.  But never had official testing for this diagnosis.  His A1c has improved.  So he can continue these current medications.  But would also have him meet with nephrology due to worsening proteinuria.       Subjective      Patient presents with:  Follow-up: 3 mos follow up diabetes.  He is working on making changes to his diet.  His A1c has improved unfortunately proteinuria continues to worsen.  Overall he is asymptomatic.  Patient does endorse left lower quadrant pain which comes and goes.  Denies any fevers nausea vomiting or diarrhea.  Does have diverticulosis.          Review of Systems   Constitutional:  Negative for activity change, fatigue and fever.   Eyes:  Negative for visual disturbance.   Respiratory:  Negative for shortness of breath.    Cardiovascular:  Negative for chest pain.   Gastrointestinal:  Negative for abdominal pain, constipation, diarrhea and nausea.        Left lower quadrant abdominal pain comes and goes.   Endocrine: Negative for cold  "intolerance and heat intolerance.   Musculoskeletal:  Negative for back pain.   Skin:  Negative for rash.   Neurological:  Negative for headaches.   Psychiatric/Behavioral:  Negative for confusion.        Current Outpatient Medications on File Prior to Visit   Medication Sig   • amLODIPine (NORVASC) 10 mg tablet Take 1 tablet (10 mg total) by mouth daily   • atorvastatin (LIPITOR) 20 mg tablet TAKE 1 TABLET BY MOUTH EVERY DAY   • glimepiride (AMARYL) 4 mg tablet Take 1 tablet (4 mg total) by mouth daily with breakfast   • glucose blood (OneTouch Verio) test strip USE 1 EACH DAILY TEST DAILY . PATIENT HAS ONE TOUCH VERIO DEVICE   • Jardiance 25 MG TABS Take 1 tablet (25 mg total) by mouth daily   • lisinopril (ZESTRIL) 40 mg tablet Take 1 tablet (40 mg total) by mouth daily Ta   • metFORMIN (GLUCOPHAGE) 1000 MG tablet TAKE 1 TABLET BY MOUTH TWICE A DAY   • nystatin (MYCOSTATIN) powder Apply topically 4 (four) times a day   • [DISCONTINUED] clotrimazole (LOTRIMIN) 1 % cream Apply topically 2 (two) times a day for 14 days (Patient not taking: Reported on 12/21/2023)       Objective     /76 (BP Location: Left arm, Patient Position: Sitting, Cuff Size: Large)   Pulse 97   Temp 97.9 °F (36.6 °C)   Resp 18   Ht 5' 9\" (1.753 m)   Wt 94.3 kg (208 lb)   SpO2 97%   BMI 30.72 kg/m²     Physical Exam  Vitals and nursing note reviewed.   Constitutional:       Appearance: He is well-developed.   HENT:      Head: Normocephalic and atraumatic.   Cardiovascular:      Rate and Rhythm: Normal rate and regular rhythm.   Pulmonary:      Effort: Pulmonary effort is normal.      Breath sounds: Normal breath sounds.   Neurological:      General: No focal deficit present.      Mental Status: He is alert.   Psychiatric:         Mood and Affect: Mood normal.         Behavior: Behavior normal.       Nik Gutierrez MD    "

## 2024-03-22 ENCOUNTER — TELEPHONE (OUTPATIENT)
Dept: NEPHROLOGY | Facility: CLINIC | Age: 64
End: 2024-03-22

## 2024-03-22 NOTE — TELEPHONE ENCOUNTER
New Patient Intake Form   Patient Details   Dawit Winchester     1960     [id number]     Insurance Information   Name of Insurance Company [facility]    Does the patient need an insurance referral? no   If patient has VA insurance, please ask if they will be using their VA insurance.   Appointment Information   Who is calling to schedule?  If not patient, what is callers name? KimR   Referring Provider  Dr. Gutierrez   Reason for Appt (Diagnosis) Microalbuminuria due to type 2 diabetes mellitus  [E11.29, R80.9]; Granulomatosis with polyangiitis, unspecified whether renal involvement (HCC) [M31.30]    Does Patient have labs/urine done at [facility]?  If not, where do they go?  List the date of last lab / urine  *Please try to get labs 2 years back if not at  yes   Has patient been hospitalized recently?  If yes, list name and location of hospital they were in no   Has patient been seen by a Nephrologist before?  If yes, list name, location and phone number Yes 20 years ago Dr. Celaya (retired) for HTN    Has the patient had renal imaging done?  If so, list the most recent date and type of imaging no    Does patient have a history of Kidney Stones? yes   Appointment Details   Is there a referral on file? yes    Appointment Date 4/25    Location  Hall   Miscellaneous

## 2024-03-29 ENCOUNTER — HOSPITAL ENCOUNTER (OUTPATIENT)
Dept: CT IMAGING | Facility: HOSPITAL | Age: 64
End: 2024-03-29
Attending: INTERNAL MEDICINE
Payer: COMMERCIAL

## 2024-03-29 DIAGNOSIS — Z87.891 PERSONAL HISTORY OF TOBACCO USE: ICD-10-CM

## 2024-03-29 PROCEDURE — 71271 CT THORAX LUNG CANCER SCR C-: CPT

## 2024-03-29 NOTE — LETTER
[facility] [address]  [address] [place] [address]      April 10, 2024    MRN: [id number]     Phone: [phone]     Dear  Ranulfo,    You recently had a(n) Cat Scan performed on 3/29/2024 at  [facility] that was requested by Long Love MD. The study was reviewed by a radiologist, which is a physician who specializes in medical imaging. The radiologist issued a report describing his or her findings. In that report there was a finding that the radiologist felt warranted further discussion with your health care provider and that discussion would be beneficial to you.      The results were sent to Long Love MD on 04/05/2024  9:21 PM. We recommend that you contact Long Love MD at [phone] or set up an appointment to discuss the results of the imaging test. If you have already heard from Long Love MD regarding the results of your study, you can disregard this letter.     This letter is not meant to alarm you, but intended to encourage you to follow-up on your results with the provider that sent you for the imaging study. In addition, we have enclosed answers to frequently asked questions by other patients who have also received a letter to review results with their health care provider (see page two).      Thank you for choosing [facility] for your medical imaging needs.                                                                                                                                                        FREQUENTLY ASKED QUESTIONS    Why am I receiving this letter?  [place] State Law requires us to notify patients who have findings on imaging exams that may require more testing or follow-up with a health professional within the next 3 months.        How serious is the finding on the imaging test?  This letter is sent to all patients who may need follow-up or more testing  within the next 3 months.  Receiving this letter does not necessarily mean you have a life-threatening imaging finding or disease.  Recommendations in the radiologist’s imaging report are general in nature and it is up to your healthcare provider to say whether those recommendations make sense for your situation.  You are strongly encouraged to talk to your health care provider about the results and ask whether additional steps need to be taken.    Where can I get a copy of the final report for my recent radiology exam?  To get a full copy of the report you can access your records online at [email or web address] or please contact [facility] Medical Records Department at [phone] Monday through Friday between 8 am and 6 pm.         What do I need to do now?           Please contact your health care provider who requested the imaging study to discuss what further actions (if any) are needed.  You may have already heard from (your ordering provider) in regard to this test in which case you can disregard this letter.        NOTICE IN ACCORDANCE WITH THE [place] STATE “PATIENT TEST RESULT INFORMATION ACT OF 2018”    You are receiving this notice as a result of a determination by your diagnostic imaging service that further discussions of your test results are warranted and would be beneficial to you.    The complete results of your test or tests have been or will be sent to the health care practitioner that ordered the test or tests. It is recommended that you contact your health care practitioner to discuss your results as soon as possible.

## 2024-04-02 ENCOUNTER — CONSULT (OUTPATIENT)
Dept: NEPHROLOGY | Facility: CLINIC | Age: 64
End: 2024-04-02
Payer: COMMERCIAL

## 2024-04-02 VITALS
DIASTOLIC BLOOD PRESSURE: 84 MMHG | HEIGHT: 69 IN | WEIGHT: 206.8 LBS | SYSTOLIC BLOOD PRESSURE: 132 MMHG | BODY MASS INDEX: 30.63 KG/M2

## 2024-04-02 DIAGNOSIS — I12.9 TYPE 2 DIABETES MELLITUS WITH STAGE 2 CHRONIC KIDNEY DISEASE AND HYPERTENSION  (HCC): ICD-10-CM

## 2024-04-02 DIAGNOSIS — R80.9 MICROALBUMINURIA DUE TO TYPE 2 DIABETES MELLITUS  (HCC): ICD-10-CM

## 2024-04-02 DIAGNOSIS — E11.22 TYPE 2 DIABETES MELLITUS WITH STAGE 2 CHRONIC KIDNEY DISEASE AND HYPERTENSION  (HCC): ICD-10-CM

## 2024-04-02 DIAGNOSIS — N18.2 TYPE 2 DIABETES MELLITUS WITH STAGE 2 CHRONIC KIDNEY DISEASE AND HYPERTENSION  (HCC): ICD-10-CM

## 2024-04-02 DIAGNOSIS — I10 PRIMARY HYPERTENSION: Primary | ICD-10-CM

## 2024-04-02 DIAGNOSIS — E11.29 MICROALBUMINURIA DUE TO TYPE 2 DIABETES MELLITUS  (HCC): ICD-10-CM

## 2024-04-02 DIAGNOSIS — M31.30 GRANULOMATOSIS WITH POLYANGIITIS, UNSPECIFIED WHETHER RENAL INVOLVEMENT (HCC): ICD-10-CM

## 2024-04-02 LAB
SL AMB  POCT GLUCOSE, UA: NORMAL
SL AMB LEUKOCYTE ESTERASE,UA: NORMAL
SL AMB POCT BILIRUBIN,UA: NORMAL
SL AMB POCT BLOOD,UA: NORMAL
SL AMB POCT CLARITY,UA: CLEAR
SL AMB POCT COLOR,UA: YELLOW
SL AMB POCT KETONES,UA: NORMAL
SL AMB POCT NITRITE,UA: NORMAL
SL AMB POCT PH,UA: 6
SL AMB POCT SPECIFIC GRAVITY,UA: 1.01
SL AMB POCT URINE PROTEIN: NORMAL
SL AMB POCT UROBILINOGEN: 0.2

## 2024-04-02 PROCEDURE — 81002 URINALYSIS NONAUTO W/O SCOPE: CPT | Performed by: INTERNAL MEDICINE

## 2024-04-02 PROCEDURE — 99204 OFFICE O/P NEW MOD 45 MIN: CPT | Performed by: INTERNAL MEDICINE

## 2024-04-02 RX ORDER — OMEPRAZOLE 20 MG/1
20 TABLET, DELAYED RELEASE ORAL
COMMUNITY

## 2024-04-02 NOTE — PATIENT INSTRUCTIONS
1.)  Low 2 g sodium diet    2.)  Monitor weights at home    3.)  Avoid NSAIDs (ibuprofen, Motrin, Advil, Aleve, naproxen)    4.)  Monitor blood pressure at home, call if blood pressure greater than 150/90 persistently    5.) I will plan to discuss all results including blood work, and/or imaging at our next visit, unless there is an urgent indication, in which case I will call you earlier. If you have any questions or concerns about your results, please feel free to call our office.    6.) SGLT2 Inhibitors such as Jardiance, have been shown and studied to reduce the decline of kidney function and reduce cardiovascular outcomes.  We recommend you continue this medication.  Please stop this medication during any period of illness, during any perioperative period.  Please maintain good foot care and avoid keto diet.  Maintain adequate hydration.  And watch out for hypoglycemia.    7.) Repeat blood and urine test in 3-4 months. Follow up in 4 months

## 2024-04-08 ENCOUNTER — TELEPHONE (OUTPATIENT)
Age: 64
End: 2024-04-08

## 2024-04-08 NOTE — TELEPHONE ENCOUNTER
Erlinda from [facility] called in stating that there are significant findings on the patients CT lung screen from 3/29.

## 2024-04-08 NOTE — TELEPHONE ENCOUNTER
Spoke with mainor- GGO have improved no noted masses- hepatomegaly noted unchanged since 2016 - he thought he read something about his spleen- unable to see anything in report about spleen    Dr. Love can you review

## 2024-04-15 ENCOUNTER — TELEPHONE (OUTPATIENT)
Dept: DERMATOLOGY | Facility: CLINIC | Age: 64
End: 2024-04-15

## 2024-04-16 ENCOUNTER — OFFICE VISIT (OUTPATIENT)
Dept: PODIATRY | Facility: CLINIC | Age: 64
End: 2024-04-16
Payer: COMMERCIAL

## 2024-04-16 VITALS
OXYGEN SATURATION: 97 % | BODY MASS INDEX: 29.77 KG/M2 | HEIGHT: 69 IN | HEART RATE: 102 BPM | DIASTOLIC BLOOD PRESSURE: 92 MMHG | SYSTOLIC BLOOD PRESSURE: 126 MMHG | WEIGHT: 201 LBS

## 2024-04-16 DIAGNOSIS — R80.9 TYPE 2 DIABETES MELLITUS WITH MICROALBUMINURIA, WITH LONG-TERM CURRENT USE OF INSULIN (HCC): Primary | ICD-10-CM

## 2024-04-16 DIAGNOSIS — E11.29 TYPE 2 DIABETES MELLITUS WITH MICROALBUMINURIA, WITH LONG-TERM CURRENT USE OF INSULIN (HCC): Primary | ICD-10-CM

## 2024-04-16 DIAGNOSIS — M20.5X2 HALLUX LIMITUS OF LEFT FOOT: ICD-10-CM

## 2024-04-16 DIAGNOSIS — Z79.4 TYPE 2 DIABETES MELLITUS WITH MICROALBUMINURIA, WITH LONG-TERM CURRENT USE OF INSULIN (HCC): Primary | ICD-10-CM

## 2024-04-16 PROCEDURE — 99212 OFFICE O/P EST SF 10 MIN: CPT | Performed by: PODIATRIST

## 2024-04-16 NOTE — PROGRESS NOTES
Assessment/Plan:     The patient's clinical examination today was relatively benign.  There are very mild bunion deformities present bilaterally with some reduced range of motion of the great toe joints bilaterally.  The patient does note some occasional tenderness at the level of the IPJ of the left great toe due to the dislike of motion.  The patient has palpable pedal pulses bilaterally.  The interdigital spaces are clear without maceration.  The pedal nail plates are of normal thickness and caliber.  Epicritic sensation is intact bilaterally.  Vibratory sensation is slightly diminished on the left intact on the right.     A diabetic foot evaluation was performed and he is deemed to be in the low risk category.  Per ADA guidelines, I would recommend continued routine diabetic foot check on a yearly basis.    In regards to his hallux limitus on the left foot, we did make recommendations for stiffer soled shoes.  He can benefit from OTC arch supports as well as carbon fiber footplate.  Ultimately if the pain gets worse, we can proceed with procedures at the level of the left great toe joint to improve range of motion.     Diagnoses and all orders for this visit:    Type 2 diabetes mellitus with microalbuminuria, with long-term current use of insulin ([facility])    Hallux limitus of left foot          Subjective:     Patient ID: Dawit Winchester is a 64 y.o. male.    The patient presents today for a routine diabetic foot check.  He has been doing well since last visit.  There are no new acute pedal issues noted today.      PAST MEDICAL HISTORY:  Past Medical History:   Diagnosis Date    CPAP (continuous positive airway pressure) dependence     Diabetes mellitus (HCC)     GERD (gastroesophageal reflux disease)     Hyperlipidemia     Hypertension     Septic arthritis of knee, right ([facility]) 5/5/2021    Sleep apnea     Squamous cell carcinoma of leg, right        PAST SURGICAL HISTORY:  Past Surgical History:   Procedure  Laterality Date    CHOLECYSTECTOMY      COLONOSCOPY      FACIAL/NECK BIOPSY Right 2/10/2022    Procedure: EXCISION LESION RIGHT EAR LOBE;  Surgeon: Jose Juan Rider MD;  Location:  MAIN OR;  Service: General    HAND TENDON SURGERY      KNEE ARTHROSCOPY      Knee, right    SPLENECTOMY, PARTIAL  1968    accident    TONSILLECTOMY      WOUND DEBRIDEMENT Right 5/5/2021    Procedure: RIGHT KNEE ARTHROSCOPY W/IRRIGATION AND DEBRIDEMENT OF SEPTIC ARTHRITIS;  Surgeon: Raul Elliott MD;  Location: [facility] OR;  Service: Orthopedics        ALLERGIES:  Amoxicillin and Amoxicillin-pot clavulanate    MEDICATIONS:  Current Outpatient Medications   Medication Sig Dispense Refill    amLODIPine (NORVASC) 10 mg tablet Take 1 tablet (10 mg total) by mouth daily 90 tablet 1    atorvastatin (LIPITOR) 20 mg tablet TAKE 1 TABLET BY MOUTH EVERY DAY 90 tablet 4    glimepiride (AMARYL) 4 mg tablet Take 1 tablet (4 mg total) by mouth daily with breakfast 90 tablet 1    glucose blood (OneTouch Verio) test strip USE 1 EACH DAILY TEST DAILY . PATIENT HAS ONE TOUCH VERIO DEVICE 100 strip 1    Jardiance 25 MG TABS Take 1 tablet (25 mg total) by mouth daily 90 tablet 1    lisinopril (ZESTRIL) 40 mg tablet Take 1 tablet (40 mg total) by mouth daily Ta 90 tablet 1    metFORMIN (GLUCOPHAGE) 1000 MG tablet TAKE 1 TABLET BY MOUTH TWICE A  tablet 1    nystatin (MYCOSTATIN) powder Apply topically 4 (four) times a day 60 g 1    omeprazole (PriLOSEC OTC) 20 MG tablet Take 20 mg by mouth 4 (four) times a week       No current facility-administered medications for this visit.       SOCIAL HISTORY:  Social History     Socioeconomic History    Marital status: Single     Spouse name: None    Number of children: None    Years of education: None    Highest education level: None   Occupational History    None   Tobacco Use    Smoking status: Former     Current packs/day: 0.00     Average packs/day: 1 pack/day for 48.0 years (48.0 ttl pk-yrs)     Types:  Cigarettes     Quit date: 2023     Years since quittin.0    Smokeless tobacco: Never    Tobacco comments:     Quit smoking 2023   Vaping Use    Vaping status: Never Used   Substance and Sexual Activity    Alcohol use: Yes     Comment: occasional    Drug use: Never    Sexual activity: Yes     Partners: Female     Birth control/protection: None   Other Topics Concern    None   Social History Narrative    None     Social Determinants of Health     Financial Resource Strain: Not on file   Food Insecurity: Not on file   Transportation Needs: Not on file   Physical Activity: Not on file   Stress: Not on file   Social Connections: Not on file   Intimate Partner Violence: Not on file   Housing Stability: Not on file        Review of Systems   Constitutional: Negative.    HENT: Negative.     Eyes: Negative.    Respiratory: Negative.     Cardiovascular: Negative.    Endocrine: Negative.    Musculoskeletal: Negative.    Neurological: Negative.    Hematological: Negative.    Psychiatric/Behavioral: Negative.           Objective:     Physical Exam  Vitals reviewed.   Constitutional:       Appearance: Normal appearance.   HENT:      Head: Normocephalic and atraumatic.      Nose: Nose normal.   Eyes:      Conjunctiva/sclera: Conjunctivae normal.      Pupils: Pupils are equal, round, and reactive to light.   Cardiovascular:      Pulses: no weak pulses.           Dorsalis pedis pulses are 2+ on the right side and 2+ on the left side.        Posterior tibial pulses are 2+ on the right side and 2+ on the left side.   Pulmonary:      Effort: Pulmonary effort is normal.   Feet:      Right foot:      Protective Sensation: 7 sites tested.  7 sites sensed.      Skin integrity: Skin integrity normal. No ulcer, skin breakdown, erythema, warmth, callus or dry skin.      Toenail Condition: Right toenails are normal.      Left foot:      Protective Sensation: 7 sites tested.  7 sites sensed.      Skin integrity: Skin integrity normal.  No ulcer, skin breakdown, erythema, warmth, callus or dry skin.      Toenail Condition: Left toenails are normal.      Comments: The patient's clinical examination today was relatively benign.  There are very mild bunion deformities present bilaterally with some reduced range of motion of the great toe joints bilaterally.  The patient has palpable pedal pulses bilaterally.  The interdigital spaces are clear without maceration.  The pedal nail plates are of normal thickness and caliber.  Epicritic sensation is intact bilaterally.  Vibratory sensation is slightly diminished on the left intact on the right.  Skin:     General: Skin is warm.      Capillary Refill: Capillary refill takes less than 2 seconds.   Neurological:      General: No focal deficit present.      Mental Status: He is alert and oriented to person, place, and time.   Psychiatric:         Mood and Affect: Mood normal.         Behavior: Behavior normal.         Thought Content: Thought content normal.         Diabetic Foot Exam    Patient's shoes and socks removed.    Right Foot/Ankle   Right Foot Inspection  Skin Exam: skin normal and skin intact. No dry skin, no warmth, no callus, no erythema, no maceration, no abnormal color, no pre-ulcer, no ulcer and no callus.     Toe Exam: ROM and strength within normal limits.     Sensory   Vibration: intact  Proprioception: intact  Monofilament testing: intact    Vascular  Capillary refills: < 3 seconds  The right DP pulse is 2+. The right PT pulse is 2+.     Left Foot/Ankle  Left Foot Inspection  Skin Exam: skin normal and skin intact. No dry skin, no warmth, no erythema, no maceration, normal color, no pre-ulcer, no ulcer and no callus.     Toe Exam: ROM and strength within normal limits.     Sensory   Vibration: diminished  Proprioception: intact  Monofilament testing: intact    Vascular  Capillary refills: < 3 seconds  The left DP pulse is 2+. The left PT pulse is 2+.     Assign Risk Category  No deformity  present  No loss of protective sensation  No weak pulses  Risk: 0

## 2024-05-21 DIAGNOSIS — I10 ESSENTIAL HYPERTENSION: ICD-10-CM

## 2024-05-21 RX ORDER — AMLODIPINE BESYLATE 10 MG/1
10 TABLET ORAL DAILY
Qty: 90 TABLET | Refills: 1 | Status: SHIPPED | OUTPATIENT
Start: 2024-05-21

## 2024-05-22 DIAGNOSIS — I10 PRIMARY HYPERTENSION: ICD-10-CM

## 2024-05-22 RX ORDER — LISINOPRIL 40 MG/1
40 TABLET ORAL DAILY
Qty: 90 TABLET | Refills: 1 | Status: SHIPPED | OUTPATIENT
Start: 2024-05-22

## 2024-06-04 DIAGNOSIS — Z79.4 TYPE 2 DIABETES MELLITUS WITHOUT COMPLICATION, WITH LONG-TERM CURRENT USE OF INSULIN (HCC): ICD-10-CM

## 2024-06-04 DIAGNOSIS — E11.9 TYPE 2 DIABETES MELLITUS WITHOUT COMPLICATION, WITH LONG-TERM CURRENT USE OF INSULIN (HCC): ICD-10-CM

## 2024-06-10 DIAGNOSIS — Z79.4 TYPE 2 DIABETES MELLITUS WITH MICROALBUMINURIA, WITH LONG-TERM CURRENT USE OF INSULIN (HCC): ICD-10-CM

## 2024-06-10 DIAGNOSIS — E11.29 TYPE 2 DIABETES MELLITUS WITH MICROALBUMINURIA, WITH LONG-TERM CURRENT USE OF INSULIN (HCC): ICD-10-CM

## 2024-06-10 DIAGNOSIS — R80.9 TYPE 2 DIABETES MELLITUS WITH MICROALBUMINURIA, WITH LONG-TERM CURRENT USE OF INSULIN (HCC): ICD-10-CM

## 2024-06-10 RX ORDER — GLIMEPIRIDE 4 MG/1
4 TABLET ORAL
Qty: 90 TABLET | Refills: 1 | Status: SHIPPED | OUTPATIENT
Start: 2024-06-10 | End: 2024-12-07

## 2024-06-24 ENCOUNTER — TELEPHONE (OUTPATIENT)
Dept: GASTROENTEROLOGY | Facility: CLINIC | Age: 64
End: 2024-06-24

## 2024-06-24 NOTE — TELEPHONE ENCOUNTER
Pt is due for a colon 3 years. hist colon polyps, diverticulosis. frm Dr Vera pt. Called and spoke to pt whom informed he does not wish to schedule at this time due to other things going on.  He said he will call back when ready to do so which will probably be with Dr Warner as that is who his wife sees.  Pt did not feel recall letter for reminder was necessary.   
fatigue/WEAKNESS

## 2024-06-30 DIAGNOSIS — Z79.4 TYPE 2 DIABETES MELLITUS WITHOUT COMPLICATION, WITH LONG-TERM CURRENT USE OF INSULIN (HCC): ICD-10-CM

## 2024-06-30 DIAGNOSIS — E11.9 TYPE 2 DIABETES MELLITUS WITHOUT COMPLICATION, WITH LONG-TERM CURRENT USE OF INSULIN (HCC): ICD-10-CM

## 2024-06-30 RX ORDER — EMPAGLIFLOZIN 25 MG/1
25 TABLET, FILM COATED ORAL DAILY
Qty: 90 TABLET | Refills: 1 | Status: SHIPPED | OUTPATIENT
Start: 2024-06-30

## 2024-07-20 ENCOUNTER — APPOINTMENT (OUTPATIENT)
Dept: LAB | Facility: HOSPITAL | Age: 64
End: 2024-07-20
Payer: COMMERCIAL

## 2024-07-20 DIAGNOSIS — I12.9 TYPE 2 DIABETES MELLITUS WITH STAGE 2 CHRONIC KIDNEY DISEASE AND HYPERTENSION  (HCC): ICD-10-CM

## 2024-07-20 DIAGNOSIS — R80.9 MICROALBUMINURIA DUE TO TYPE 2 DIABETES MELLITUS  (HCC): ICD-10-CM

## 2024-07-20 DIAGNOSIS — E11.29 MICROALBUMINURIA DUE TO TYPE 2 DIABETES MELLITUS  (HCC): ICD-10-CM

## 2024-07-20 DIAGNOSIS — I10 PRIMARY HYPERTENSION: ICD-10-CM

## 2024-07-20 DIAGNOSIS — E11.22 TYPE 2 DIABETES MELLITUS WITH STAGE 2 CHRONIC KIDNEY DISEASE AND HYPERTENSION  (HCC): ICD-10-CM

## 2024-07-20 DIAGNOSIS — N18.2 TYPE 2 DIABETES MELLITUS WITH STAGE 2 CHRONIC KIDNEY DISEASE AND HYPERTENSION  (HCC): ICD-10-CM

## 2024-07-20 LAB
ALBUMIN SERPL BCG-MCNC: 4.4 G/DL (ref 3.5–5)
ALP SERPL-CCNC: 64 U/L (ref 34–104)
ALT SERPL W P-5'-P-CCNC: 23 U/L (ref 7–52)
ANION GAP SERPL CALCULATED.3IONS-SCNC: 13 MMOL/L (ref 4–13)
AST SERPL W P-5'-P-CCNC: 21 U/L (ref 13–39)
BACTERIA UR QL AUTO: ABNORMAL /HPF
BILIRUB SERPL-MCNC: 0.8 MG/DL (ref 0.2–1)
BILIRUB UR QL STRIP: NEGATIVE
BUN SERPL-MCNC: 13 MG/DL (ref 5–25)
C3 SERPL-MCNC: 155 MG/DL (ref 87–200)
C4 SERPL-MCNC: 31 MG/DL (ref 19–52)
CALCIUM SERPL-MCNC: 9.5 MG/DL (ref 8.4–10.2)
CHLORIDE SERPL-SCNC: 99 MMOL/L (ref 96–108)
CLARITY UR: CLEAR
CO2 SERPL-SCNC: 30 MMOL/L (ref 21–32)
COLOR UR: YELLOW
CREAT SERPL-MCNC: 1.07 MG/DL (ref 0.6–1.3)
CREAT UR-MCNC: 57.8 MG/DL
EST. AVERAGE GLUCOSE BLD GHB EST-MCNC: 154 MG/DL
GFR SERPL CREATININE-BSD FRML MDRD: 71 ML/MIN/1.73SQ M
GLUCOSE P FAST SERPL-MCNC: 126 MG/DL (ref 65–99)
GLUCOSE UR STRIP-MCNC: ABNORMAL MG/DL
HBA1C MFR BLD: 7 %
HGB UR QL STRIP.AUTO: ABNORMAL
KETONES UR STRIP-MCNC: NEGATIVE MG/DL
LEUKOCYTE ESTERASE UR QL STRIP: NEGATIVE
MICROALBUMIN UR-MCNC: 578.9 MG/L
MICROALBUMIN/CREAT 24H UR: 1002 MG/G CREATININE (ref 0–30)
NITRITE UR QL STRIP: NEGATIVE
NON-SQ EPI CELLS URNS QL MICRO: ABNORMAL /HPF
PH UR STRIP.AUTO: 6.5 [PH]
POTASSIUM SERPL-SCNC: 3.2 MMOL/L (ref 3.5–5.3)
PROT SERPL-MCNC: 7.8 G/DL (ref 6.4–8.4)
PROT UR STRIP-MCNC: ABNORMAL MG/DL
RBC #/AREA URNS AUTO: ABNORMAL /HPF
SODIUM SERPL-SCNC: 142 MMOL/L (ref 135–147)
SP GR UR STRIP.AUTO: 1.01 (ref 1–1.03)
UROBILINOGEN UR QL STRIP.AUTO: 0.2 E.U./DL
WBC #/AREA URNS AUTO: ABNORMAL /HPF

## 2024-07-20 PROCEDURE — 36415 COLL VENOUS BLD VENIPUNCTURE: CPT

## 2024-07-20 PROCEDURE — 86162 COMPLEMENT TOTAL (CH50): CPT

## 2024-07-20 PROCEDURE — 86160 COMPLEMENT ANTIGEN: CPT

## 2024-07-20 PROCEDURE — 83520 IMMUNOASSAY QUANT NOS NONAB: CPT

## 2024-07-20 PROCEDURE — 83036 HEMOGLOBIN GLYCOSYLATED A1C: CPT

## 2024-07-20 PROCEDURE — 83516 IMMUNOASSAY NONANTIBODY: CPT

## 2024-07-20 PROCEDURE — 83521 IG LIGHT CHAINS FREE EACH: CPT

## 2024-07-20 PROCEDURE — 80053 COMPREHEN METABOLIC PANEL: CPT

## 2024-07-20 PROCEDURE — 81001 URINALYSIS AUTO W/SCOPE: CPT

## 2024-07-20 PROCEDURE — 86037 ANCA TITER EACH ANTIBODY: CPT

## 2024-07-20 PROCEDURE — 82570 ASSAY OF URINE CREATININE: CPT

## 2024-07-20 PROCEDURE — 84165 PROTEIN E-PHORESIS SERUM: CPT

## 2024-07-20 PROCEDURE — 82043 UR ALBUMIN QUANTITATIVE: CPT

## 2024-07-22 LAB
CH50 SERPL-ACNC: >60 U/ML
PLA2R IGG SER IA-ACNC: 2 RU/ML (ref 0–19.9)

## 2024-07-23 LAB
ALBUMIN SERPL ELPH-MCNC: 3.87 G/DL (ref 3.2–5.1)
ALBUMIN SERPL ELPH-MCNC: 53.8 % (ref 48–70)
ALPHA1 GLOB SERPL ELPH-MCNC: 0.23 G/DL (ref 0.15–0.47)
ALPHA1 GLOB SERPL ELPH-MCNC: 3.2 % (ref 1.8–7)
ALPHA2 GLOB SERPL ELPH-MCNC: 1.11 G/DL (ref 0.42–1.04)
ALPHA2 GLOB SERPL ELPH-MCNC: 15.4 % (ref 5.9–14.9)
BETA GLOB ABNORMAL SERPL ELPH-MCNC: 0.53 G/DL (ref 0.31–0.57)
BETA1 GLOB SERPL ELPH-MCNC: 7.4 % (ref 4.7–7.7)
BETA2 GLOB SERPL ELPH-MCNC: 6 % (ref 3.1–7.9)
BETA2+GAMMA GLOB SERPL ELPH-MCNC: 0.43 G/DL (ref 0.2–0.58)
C-ANCA TITR SER IF: NORMAL TITER
GAMMA GLOB ABNORMAL SERPL ELPH-MCNC: 1.02 G/DL (ref 0.4–1.66)
GAMMA GLOB SERPL ELPH-MCNC: 14.2 % (ref 6.9–22.3)
GBM AB SER IA-ACNC: <0.2 UNITS (ref 0–0.9)
IGG/ALB SER: 1.16 {RATIO} (ref 1.1–1.8)
KAPPA LC FREE SER-MCNC: 32.7 MG/L (ref 3.3–19.4)
KAPPA LC FREE/LAMBDA FREE SER: 1.04 {RATIO} (ref 0.26–1.65)
LAMBDA LC FREE SERPL-MCNC: 31.3 MG/L (ref 5.7–26.3)
MYELOPEROXIDASE AB SER IA-ACNC: <0.2 UNITS (ref 0–0.9)
P-ANCA ATYPICAL TITR SER IF: NORMAL TITER
P-ANCA TITR SER IF: NORMAL TITER
PROT PATTERN SERPL ELPH-IMP: ABNORMAL
PROT SERPL-MCNC: 7.2 G/DL (ref 6.4–8.2)
PROTEINASE3 AB SER IA-ACNC: <0.2 UNITS (ref 0–0.9)

## 2024-07-23 PROCEDURE — 84165 PROTEIN E-PHORESIS SERUM: CPT | Performed by: STUDENT IN AN ORGANIZED HEALTH CARE EDUCATION/TRAINING PROGRAM

## 2024-08-12 DIAGNOSIS — E78.5 HYPERLIPIDEMIA, UNSPECIFIED HYPERLIPIDEMIA TYPE: ICD-10-CM

## 2024-08-12 DIAGNOSIS — I10 ESSENTIAL HYPERTENSION: ICD-10-CM

## 2024-08-12 DIAGNOSIS — E11.9 TYPE 2 DIABETES MELLITUS WITHOUT COMPLICATION, WITH LONG-TERM CURRENT USE OF INSULIN (HCC): ICD-10-CM

## 2024-08-12 DIAGNOSIS — Z79.4 TYPE 2 DIABETES MELLITUS WITHOUT COMPLICATION, WITH LONG-TERM CURRENT USE OF INSULIN (HCC): ICD-10-CM

## 2024-08-13 ENCOUNTER — TELEPHONE (OUTPATIENT)
Dept: NEPHROLOGY | Facility: CLINIC | Age: 64
End: 2024-08-13

## 2024-08-13 RX ORDER — ATORVASTATIN CALCIUM 20 MG/1
20 TABLET, FILM COATED ORAL DAILY
Qty: 30 TABLET | Refills: 0 | Status: SHIPPED | OUTPATIENT
Start: 2024-08-13

## 2024-08-13 NOTE — TELEPHONE ENCOUNTER
Pt appt rescheduled til tomorrow 8/14/24 at  3:30.  Asked that he call back to confirm  he can keep this appt date and time.

## 2024-08-14 ENCOUNTER — OFFICE VISIT (OUTPATIENT)
Dept: NEPHROLOGY | Facility: CLINIC | Age: 64
End: 2024-08-14

## 2024-08-14 VITALS
OXYGEN SATURATION: 96 % | HEART RATE: 82 BPM | BODY MASS INDEX: 29.62 KG/M2 | HEIGHT: 69 IN | SYSTOLIC BLOOD PRESSURE: 110 MMHG | DIASTOLIC BLOOD PRESSURE: 80 MMHG | WEIGHT: 200 LBS

## 2024-08-14 DIAGNOSIS — I12.9 TYPE 2 DIABETES MELLITUS WITH STAGE 2 CHRONIC KIDNEY DISEASE AND HYPERTENSION  (HCC): Primary | ICD-10-CM

## 2024-08-14 DIAGNOSIS — I10 ESSENTIAL HYPERTENSION: ICD-10-CM

## 2024-08-14 DIAGNOSIS — N18.2 TYPE 2 DIABETES MELLITUS WITH STAGE 2 CHRONIC KIDNEY DISEASE AND HYPERTENSION  (HCC): Primary | ICD-10-CM

## 2024-08-14 DIAGNOSIS — I10 PRIMARY HYPERTENSION: ICD-10-CM

## 2024-08-14 DIAGNOSIS — E11.22 TYPE 2 DIABETES MELLITUS WITH STAGE 2 CHRONIC KIDNEY DISEASE AND HYPERTENSION  (HCC): Primary | ICD-10-CM

## 2024-08-14 RX ORDER — AMLODIPINE BESYLATE 10 MG/1
5 TABLET ORAL DAILY
Start: 2024-08-14

## 2024-08-14 NOTE — PROGRESS NOTES
NEPHROLOGY OFFICE VISIT   Dawit Winchester 64 y.o. male MRN: [id number]  8/14/2024    Reason for Visit: Proteinuria    History of Present Illness (HPI):    I had the pleasure of seeing Dawit today in the renal clinic for the continued management of proteinuria and chronic kidney disease stage II. Since our last visit, there has been no ER visits or hospitilizations. He currently has no complaints at this time and is feeling well. Patient denies any chest pain, shortness of breath and swelling. The last blood work was done on July 20 which included extensive serologic workup along with a CMP and micral/creatinine ratio, which we have reviewed together.    His urine analysis was positive for protein and glucose should be noted that the patient is on Jardiance.  But no evidence of microscopic hematuria.    His complement C3 and C4 are normal.  His phospholipase A2 receptor antibodies, ANCA, anti-GBM, were negative.  His SPEP showed no monoclonal bands.    His diabetic management seems to be well-controlled as his hemoglobin A1c has improved to 7.    Does not check his blood pressure at home but his blood pressure today is under excellent control and he is maintained on lisinopril 40 mg daily.  He is overweight at 200 pounds.  Reports he does not think he takes too much salt but he does admit to eating some processed foods which can be high in salt.  I asked him to really look at his diet and cut back on the salt as high sodium diet can negate the beneficial effects of the ACE inhibitor.    Avoid excessive NSAIDs.    Exercise diet weight loss strongly recommended.    I will have him reduce his amlodipine to 5 mg as there can be slight increase though amlodipine has been shown not to cause any significant effect on the proteinuria but if the proteinuria continues to worsen I would eventually like to stop the amlodipine and instead use spironolactone in addition to the lisinopril for its antiproteinuric  effects.    ASSESSMENT and PLAN:    Proteinuria  -- presumed to be secondary to diabetic kidney disease, also with a longstanding smoking history  -- 24 hour urine protein or urine protein creatinine ratio: Grabs/creatinine ratio is 0.8 g/g  -- Current Blood Pressure: Diastolic blood pressure above target  -- current anti proteinuric medications: Lisinopril  -- Interventions: Lifestyle modifications need to be reinforced as he is not completely on a low-salt diet and eats a lot of processed foods.  -- General Recommendations:  RAAS Blockade with high dose ARB or ACEI for target blood pressure < 130/80 as tolerated, with spironolactone as a good adjunct for anti proteinuric effect.  Management of hypervolemia for blood pressure control as needed.  Avoid combination ACEI + ARB, due to high adverse effects (ONTARGET study)  Direct Renin Inhibitor + ACEI or ARB has FDA black box warning for patients with diabetes and GFR < 60 cc/min due to risk for non fatal stroke, renal complications, hyperkalemia and hypotension (ALTITUDE study)  Moderate dietary protein restriction 0.8 gm/kg  Recommend statin therapy if no contraindications.   Recommend 1, 25 vitamin-D such as Paricalcitol if appropropriate (VITAL study)     Chronic Kidney Disease Stage II (G2A3)  --Imaging: Abdominal ultrasound from 2020 reviewed.  Symmetrically sized kidneys with no masses or lesions.  --Urinalysis: Negative for blood.  Positive for protein and glucose.  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  Did discuss a biopsy with the patient  --Serologies: Complement C3 and C4 are normal.  ANCA titer was negative.  Anti-GBM was negative.  Kappa lambda ratio was normal at 1, serum protein  electrophoresis showed no monoclonal bands, phospholipase A2 receptor antibodies were negative  --RAAS Blockade: Lisinopril  --Reducing Cardiovascular Risk Factors:  Simvastatin+ Ezetimibe reduced atherosclerotic events in CKD (SHARP trial); Low dose aspirin safe (if no contraindications exist); smoking is an independent risk factor for Chronic Kidney Disease and progression, strongly recommend smoking cessation  --Sodium-Glucose Cotransporter-2 (SGLT2) Inhibitors:  May see an acute drop in the eGFR initially when starting the medication but then a stabilization of the renal function, with slower loss of renal function as compared to placebo.  Relative risk of end-stage renal disease, doubling of serum creatinine or death from renal causes were also found to be lower as compared to placebo. (CREDENCE).  DAPA-CKD study, showed that patients with chronic kidney disease regardless of the presence or absence of diabetes the risk of composite of a sustained decline in the estimated GFR of at least 50%, end-stage renal disease or death from renal or cardiovascular causes was significantly lower with Dapagliflozin than with placebo. EMPEROR-Reduced study also showed beneficial effects. EMPA-CKD, among wide range of patients with CKD, who were at risk for progression, empagliflozin led a lower risk of kidney disease progression or death from cardiovascular causes than placebo.  If no contraindications exist I would recommend this medication added to the regiment. If eGFR < 60 cc/min (avoid ertugliflozin); avoid or caution with GFR < 20 mL/min, not an absolute contraindication, likely lower benefits.   --Finerenone: In patients with chronic kidney disease with type 2 diabetes, treatment led to lower risks of CKD progression and cardiovascular events than placebo. (FIDELIO-DKD)  --Status: Renal function remained stable though the proteinuria continues to slowly worsen.  --Management/Recommendations: Wrongly advised a salt  restriction.  Continue lisinopril.  Reduce amlodipine 5 mg.  If it worsens stop amlodipine altogether and start spironolactone.  Serologies were negative.  Discussed about a renal biopsy will hold off at this time though if this significantly worsens we may need to consider a biopsy.     Hypertension  -- Stage II (American College of Cardiology/American Heart Association)  -- with underlying chronic kidney disease and obesity, former smoker  -- Body mass index is 29.5 kg/m².  -- Volume status: Euvolemic  -- Etiology: Primary hypertension, obesity and former smoker  -- Secondary Work-Up: No clinical indication  -- Target Goal: < 130/80 (ACC/AHA, CKD with proteinuria, CKD in diabetics) ; if tolerated can target SBP < 120 mmHg in high cardiovascular risk ( Age > 75, CKD, CVD, No Diabetics SPRINT)  -- Lifestyle Modifications: DASH Diet, Weight Loss for ideal body weight, 45 mins of cardiovascular exercise 3 times a week as tolerated, and if no contraindications exist, smoking cessation, limit alcohol use, avoid NSAIDS, monitor blood pressure at home  -- Status: Blood pressure under excellent control  -- Current antihypertensive regiment: Lisinopril 40 mg daily, amlodipine 10 mg daily  -- Changes: Lifestyle modifications low 2 g sodium diet.  Reduce amlodipine to 5 mg daily.  If the proteinuria worsens eventual goal is to stop the amlodipine and start spironolactone.  Continue lisinopril at this time.     Diabetes mellitus type 2  -hemoglobin A1c: 7 (A1C values may be falsely elevated or decreased in those with CKD, assay method should be certified by National glycohemoglobin standardization Program). Target A1C less then 7 (will need to be individualized for each patient), and would utilize A1C  in conjunction with continuous glucose monitoring (CGM).  It should also be noted that the A1c with more advanced kidney disease would be inaccurate due to decreased red blood cell life along with anemia.  -current medications:  Jardiance 25 mg daily, metformin 1000 mg twice a day, glimepiride 4 mg daily  -proteinuria: Yes  -retinopathy: None  -neuropathy: Not reported  -please follow with an ophthalmologist and podiatrist every year  -continued self monitoring of blood glucose at home  -Management in CKD Recommendations:   Metformin:  Avoid if creatinine clearance is less than 30 cc/min (concern for lactic acidosis)  Sodium-Glucose Cotransporter-2 (SGLT2) Inhibitors:  May see an acute drop in the eGFR initially when starting the medication but then a stabilization of the renal function, with slower loss of renal function as compared to placebo.  Relative risk of end-stage renal disease, doubling of serum creatinine or death from renal causes were also found to be lower as compared to placebo. (CREDENCE).  DAPA-CKD study, showed that patients with chronic kidney disease regardless of the presence or absence of diabetes the risk of composite of a sustained decline in the estimated GFR of at least 50%, end-stage renal disease or death from renal or cardiovascular causes was significantly lower with Dapagliflozin than with placebo. EMPEROR-Reduced study also showed beneficial effects. EMPA-CKD, among wide range of patients with CKD, who were at risk for progression, empagliflozin led a lower risk of kidney disease progression or death from cardiovascular causes than placebo.  If no contraindications exist I would recommend this medication added to the regiment. If eGFR < 60 cc/min (avoid ertugliflozin); avoid or caution with GFR < 20 mL/min, not an absolute contraindication, likely lower benefits   Sulfonylureas:  Preferred short-acting (glipizide, glimepiride, repaglinide), relatively safe in patients with non dialysis CKD  Thiazolidinedones/Alpha-Gluosidase inhibitors/Dipeptidyl peptidase-4 inhibitors:  Generally not considered first-line agents in CKD (limited data in long-term safety and efficacy)  Insulin:  Starting dose may need to be  lower than what would ordinarily be used, as there is a decrease metabolism of insulin (no dose adjustment if the GFR > 50 mL/min, dose should be reduced to 75% of baseline if GFR 10-50 mL/min, and 50% baseline if GFR < 10 mL/min)  Finerenone: Patients with CKD with type 2 diabetes, treatment led to lower risks of CKD progression and cardiovascular events than placebo (FIDELIO-DKD). Avoid or caution if serum potassium more then 4.8.     Overweight  -- BMI 29.5, this is an improvement  --Recommend decreasing portion sizes, encouraging healthy choices of fruits and vegetables, consuming healthier snacks and moderation in carbohydrate intake. Exercise recommendations; 3-5 times a week, the American Heart Association recommends 150 minutes a week moderate exercise  --Strongly recommend evaluation by nutritionist  --Overweight and obesity have been associated with increased mortality and morbidity.  Associated with a reduction in life expectancy, associated with increased all cause and cardiovascular mortality  --Metabolic risks, including increased risk of diabetes type 2, insulin resistance with hyperinsulinemia (weight loss of 5 to 7% associated with a decreased risk of type 2 diabetes among individuals with prediabetes and weight loss of 15% can lead to dramatic improvement of diabetes in nearly half of people).  Dyslipidemia, hypertension and heart disease are all increased in patients with obesity.  Along with increased risk of stroke increased risk of multiple cancer types.  Can also be associated with increased renal dysfunction, strongest risk factor for new onset chronic kidney disease.  There is also a degree of compensatory hyperfiltration to meet high in the metabolic demands of increased body weight.  This can also result in increased increased risk for nephrolithiasis.         PATIENT INSTRUCTIONS:    Patient Instructions   1.)  Low 2 g sodium diet    2.)  Monitor weights at home    3.)  Avoid NSAIDs  "(ibuprofen, Motrin, Advil, Aleve, naproxen)    4.)  Monitor blood pressure at home, call if blood pressure greater than 150/90 persistently    5.) I will plan to discuss all results including blood work, and/or imaging at our next visit, unless there is an urgent indication, in which case I will call you earlier. If you have any questions or concerns about your results, please feel free to call our office.    6.) Cut amlodipine to 5 mg (half tablet)      Orders Placed This Encounter   Procedures    Albumin / creatinine urine ratio     Standing Status:   Future     Standing Expiration Date:   8/14/2025    Comprehensive metabolic panel     This is a patient instruction: Patient fasting for 8 hours or longer recommended.     Standing Status:   Future     Standing Expiration Date:   8/14/2025       OBJECTIVE:  Current Weight: Weight - Scale: 90.7 kg (200 lb)  Vitals:    08/14/24 1530   BP: 110/80   BP Location: Left arm   Patient Position: Sitting   Cuff Size: Standard   Pulse: 82   SpO2: 96%   Weight: 90.7 kg (200 lb)   Height: 5' 9\" (1.753 m)    Body mass index is 29.53 kg/m².      REVIEW OF SYSTEMS:    Review of Systems   Constitutional:  Negative for activity change and fever.   Respiratory:  Negative for cough, chest tightness, shortness of breath and wheezing.    Cardiovascular:  Negative for chest pain and leg swelling.   Gastrointestinal:  Negative for abdominal pain, diarrhea, nausea and vomiting.   Endocrine: Negative for polyuria.   Genitourinary:  Negative for difficulty urinating, dysuria, flank pain, frequency and urgency.   Skin:  Negative for rash.   Neurological:  Negative for dizziness, syncope, light-headedness and headaches.       PHYSICAL EXAM:      Physical Exam  Vitals and nursing note reviewed.   Constitutional:       General: He is not in acute distress.     Appearance: He is well-developed.   HENT:      Head: Normocephalic and atraumatic.   Eyes:      General: No scleral icterus.     " "Conjunctiva/sclera: Conjunctivae normal.      Pupils: Pupils are equal, round, and reactive to light.   Cardiovascular:      Rate and Rhythm: Normal rate and regular rhythm.      Heart sounds: S1 normal and S2 normal. No murmur heard.     No friction rub. No gallop.   Pulmonary:      Effort: Pulmonary effort is normal. No respiratory distress.      Breath sounds: Normal breath sounds. No wheezing or rales.   Abdominal:      General: Bowel sounds are normal.      Palpations: Abdomen is soft.      Tenderness: There is no abdominal tenderness. There is no rebound.   Musculoskeletal:         General: Normal range of motion.      Cervical back: Normal range of motion and neck supple.   Skin:     Findings: No rash.   Neurological:      Mental Status: He is alert and oriented to person, place, and time.   Psychiatric:         Behavior: Behavior normal.         Medications:    Current Outpatient Medications:     amLODIPine (NORVASC) 10 mg tablet, Take 0.5 tablets (5 mg total) by mouth daily, Disp: , Rfl:     atorvastatin (LIPITOR) 20 mg tablet, Take 1 tablet (20 mg total) by mouth daily, Disp: 30 tablet, Rfl: 0    glimepiride (AMARYL) 4 mg tablet, Take 1 tablet (4 mg total) by mouth daily with breakfast, Disp: 90 tablet, Rfl: 1    Jardiance 25 MG TABS, Take 1 tablet (25 mg total) by mouth daily, Disp: 90 tablet, Rfl: 1    lisinopril (ZESTRIL) 40 mg tablet, Take 1 tablet (40 mg total) by mouth daily Ta, Disp: 90 tablet, Rfl: 1    metFORMIN (GLUCOPHAGE) 1000 MG tablet, TAKE 1 TABLET BY MOUTH TWICE A DAY, Disp: 180 tablet, Rfl: 1    omeprazole (PriLOSEC OTC) 20 MG tablet, Take 20 mg by mouth 4 (four) times a week, Disp: , Rfl:     glucose blood (OneTouch Verio) test strip, USE 1 EACH DAILY TEST DAILY . PATIENT HAS ONE TOUCH VERIO DEVICE, Disp: 100 strip, Rfl: 1    nystatin (MYCOSTATIN) powder, Apply topically 4 (four) times a day, Disp: 60 g, Rfl: 1    Laboratory Results:        Invalid input(s): \"ALBUMIN\"    Results for " orders placed or performed in visit on 07/20/24   Albumin / creatinine urine ratio   Result Value Ref Range    Creatinine, Ur 57.8 Reference range not established. mg/dL    Albumin,U,Random 578.9 (H) <20.0 mg/L    Albumin Creat Ratio 1,002 (H) 0 - 30 mg/g creatinine   Comprehensive metabolic panel   Result Value Ref Range    Sodium 142 135 - 147 mmol/L    Potassium 3.2 (L) 3.5 - 5.3 mmol/L    Chloride 99 96 - 108 mmol/L    CO2 30 21 - 32 mmol/L    ANION GAP 13 4 - 13 mmol/L    BUN 13 5 - 25 mg/dL    Creatinine 1.07 0.60 - 1.30 mg/dL    Glucose, Fasting 126 (H) 65 - 99 mg/dL    Calcium 9.5 8.4 - 10.2 mg/dL    AST 21 13 - 39 U/L    ALT 23 7 - 52 U/L    Alkaline Phosphatase 64 34 - 104 U/L    Total Protein 7.8 6.4 - 8.4 g/dL    Albumin 4.4 3.5 - 5.0 g/dL    Total Bilirubin 0.80 0.20 - 1.00 mg/dL    eGFR 71 ml/min/1.73sq m   Hemoglobin A1C   Result Value Ref Range    Hemoglobin A1C 7.0 (H) Normal 4.0-5.6%; PreDiabetic 5.7-6.4%; Diabetic >=6.5%; Glycemic control for adults with diabetes <7.0% %     mg/dl   Anti-neutrophilic cytoplasmic antibody   Result Value Ref Range    C-ANCA <1:20 Neg:<1:20 titer    Atypical pANCA <1:20 Neg:<1:20 titer    MPO AB <0.2 0.0 - 0.9 units    WV-3 AB <0.2 0.0 - 0.9 units    P-ANCA <1:20 Neg:<1:20 titer   Glomerular basement membrane antibodies   Result Value Ref Range    GBM Ab <0.2 0.0 - 0.9 units   Immunoglobulin free LT chains blood   Result Value Ref Range    Ig Kappa Free Light Chain 32.7 (H) 3.3 - 19.4 mg/L    Ig Lambda Free Light Chain 31.3 (H) 5.7 - 26.3 mg/L    Kappa/Lambda FluidC Ratio 1.04 0.26 - 1.65   Phospholipase A2 Receptor Antibodies   Result Value Ref Range    PHOSPHOLIPASE A2 RECEPTOR LUPE, S 2.0 0.0 - 19.9 RU/mL   Protein electrophoresis, serum   Result Value Ref Range    A/G Ratio 1.16 1.10 - 1.80    Albumin Electrophoresis 53.8 48.0 - 70.0 %    Albumin CONC 3.87 3.20 - 5.10 g/dl    Alpha 1 3.2 1.8 - 7.0 %    ALPHA 1 CONC 0.23 0.15 - 0.47 g/dL    Alpha 2 15.4  (H) 5.9 - 14.9 %    ALPHA 2 CONC 1.11 (H) 0.42 - 1.04 g/dL    Beta-1 7.4 4.7 - 7.7 %    BETA 1 CONC 0.53 0.31 - 0.57 g/dL    Beta-2 6.0 3.1 - 7.9 %    BETA 2 CONC 0.43 0.20 - 0.58 g/dL    Gamma Globulin 14.2 6.9 - 22.3 %    GAMMA CONC 1.02 0.40 - 1.66 g/dL    Total Protein 7.2 6.4 - 8.2 g/dL    SPEP Interpretation See Comment    Urinalysis with microscopic   Result Value Ref Range    Color, UA Yellow Yellow    Clarity, UA Clear Clear    Specific Gravity, UA 1.015 1.001 - 1.030    pH, UA 6.5 5.0, 5.5, 6.0, 6.5, 7.0, 7.5, 8.0    Leukocytes, UA Negative Negative    Nitrite, UA Negative Negative    Protein, UA 2+ (A) Negative, Interference- unable to analyze mg/dl    Glucose, UA 3+ (A) Negative mg/dl    Ketones, UA Negative Negative mg/dl    Urobilinogen, UA 0.2 0.2, 1.0 E.U./dl E.U./dl    Bilirubin, UA Negative Negative    Occult Blood, UA 1+ (A) Negative    RBC, UA 0-1 None Seen, 0-1, 1-2, 2-4, 0-5 /hpf    WBC, UA 0-1 None Seen, 0-1, 1-2, 0-5, 2-4 /hpf    Epithelial Cells Occasional None Seen, Occasional /hpf    Bacteria, UA Occasional None Seen, Occasional /hpf   C3 complement   Result Value Ref Range    C3 Complement 155 87 - 200 mg/dL   C4 complement   Result Value Ref Range    C4, COMPLEMENT 31 19 - 52 mg/dL   Complement, total   Result Value Ref Range    Compl, Total (CH50) >60 >41 U/mL

## 2024-08-14 NOTE — PATIENT INSTRUCTIONS
1.)  Low 2 g sodium diet    2.)  Monitor weights at home    3.)  Avoid NSAIDs (ibuprofen, Motrin, Advil, Aleve, naproxen)    4.)  Monitor blood pressure at home, call if blood pressure greater than 150/90 persistently    5.) I will plan to discuss all results including blood work, and/or imaging at our next visit, unless there is an urgent indication, in which case I will call you earlier. If you have any questions or concerns about your results, please feel free to call our office.    6.) Cut amlodipine to 5 mg (half tablet)

## 2024-08-24 DIAGNOSIS — Z79.4 TYPE 2 DIABETES MELLITUS WITHOUT COMPLICATION, WITH LONG-TERM CURRENT USE OF INSULIN (HCC): ICD-10-CM

## 2024-08-24 DIAGNOSIS — E11.9 TYPE 2 DIABETES MELLITUS WITHOUT COMPLICATION, WITH LONG-TERM CURRENT USE OF INSULIN (HCC): ICD-10-CM

## 2024-08-26 RX ORDER — BLOOD SUGAR DIAGNOSTIC
STRIP MISCELLANEOUS
Qty: 100 STRIP | Refills: 1 | Status: SHIPPED | OUTPATIENT
Start: 2024-08-26

## 2024-09-03 ENCOUNTER — TELEPHONE (OUTPATIENT)
Age: 64
End: 2024-09-03

## 2024-09-03 NOTE — TELEPHONE ENCOUNTER
Patient had labs done for Dr. Rome on 7/20/24.  Does Dr. Gutierrez need any other labs done prior to patient's visit 9/23/24.  Please advise the patient if anything additional is needed. Thank you.

## 2024-09-04 DIAGNOSIS — Z12.5 SCREENING FOR PROSTATE CANCER: ICD-10-CM

## 2024-09-04 DIAGNOSIS — E78.2 MIXED HYPERLIPIDEMIA: Primary | ICD-10-CM

## 2024-09-05 DIAGNOSIS — Z79.4 TYPE 2 DIABETES MELLITUS WITHOUT COMPLICATION, WITH LONG-TERM CURRENT USE OF INSULIN (HCC): ICD-10-CM

## 2024-09-05 DIAGNOSIS — E78.5 HYPERLIPIDEMIA, UNSPECIFIED HYPERLIPIDEMIA TYPE: ICD-10-CM

## 2024-09-05 DIAGNOSIS — E11.9 TYPE 2 DIABETES MELLITUS WITHOUT COMPLICATION, WITH LONG-TERM CURRENT USE OF INSULIN (HCC): ICD-10-CM

## 2024-09-05 DIAGNOSIS — I10 ESSENTIAL HYPERTENSION: ICD-10-CM

## 2024-09-05 RX ORDER — ATORVASTATIN CALCIUM 20 MG/1
20 TABLET, FILM COATED ORAL DAILY
Qty: 30 TABLET | Refills: 0 | Status: SHIPPED | OUTPATIENT
Start: 2024-09-05

## 2024-09-05 NOTE — TELEPHONE ENCOUNTER
Called patient no response left message letting him know labs where ordered and it dose require for him to fast. If any question he can call the office.

## 2024-09-07 DIAGNOSIS — E11.29 TYPE 2 DIABETES MELLITUS WITH MICROALBUMINURIA, WITH LONG-TERM CURRENT USE OF INSULIN (HCC): ICD-10-CM

## 2024-09-07 DIAGNOSIS — Z79.4 TYPE 2 DIABETES MELLITUS WITH MICROALBUMINURIA, WITH LONG-TERM CURRENT USE OF INSULIN (HCC): ICD-10-CM

## 2024-09-07 DIAGNOSIS — R80.9 TYPE 2 DIABETES MELLITUS WITH MICROALBUMINURIA, WITH LONG-TERM CURRENT USE OF INSULIN (HCC): ICD-10-CM

## 2024-09-07 RX ORDER — GLIMEPIRIDE 4 MG/1
4 TABLET ORAL
Qty: 90 TABLET | Refills: 1 | Status: SHIPPED | OUTPATIENT
Start: 2024-09-07

## 2024-09-23 ENCOUNTER — OFFICE VISIT (OUTPATIENT)
Dept: FAMILY MEDICINE CLINIC | Facility: CLINIC | Age: 64
End: 2024-09-23
Payer: COMMERCIAL

## 2024-09-23 VITALS
WEIGHT: 207 LBS | TEMPERATURE: 97.6 F | DIASTOLIC BLOOD PRESSURE: 72 MMHG | RESPIRATION RATE: 18 BRPM | SYSTOLIC BLOOD PRESSURE: 130 MMHG | HEART RATE: 103 BPM | HEIGHT: 69 IN | OXYGEN SATURATION: 97 % | BODY MASS INDEX: 30.66 KG/M2

## 2024-09-23 DIAGNOSIS — Z00.00 MEDICARE ANNUAL WELLNESS VISIT, INITIAL: ICD-10-CM

## 2024-09-23 DIAGNOSIS — E11.29 MICROALBUMINURIA DUE TO TYPE 2 DIABETES MELLITUS  (HCC): ICD-10-CM

## 2024-09-23 DIAGNOSIS — I12.9 TYPE 2 DIABETES MELLITUS WITH STAGE 2 CHRONIC KIDNEY DISEASE AND HYPERTENSION  (HCC): ICD-10-CM

## 2024-09-23 DIAGNOSIS — Z86.010 HISTORY OF COLONIC POLYPS: ICD-10-CM

## 2024-09-23 DIAGNOSIS — Z86.0100 HISTORY OF COLONIC POLYPS: ICD-10-CM

## 2024-09-23 DIAGNOSIS — F41.9 ANXIETY: ICD-10-CM

## 2024-09-23 DIAGNOSIS — L30.4 INTERTRIGO: ICD-10-CM

## 2024-09-23 DIAGNOSIS — I10 PRIMARY HYPERTENSION: Primary | ICD-10-CM

## 2024-09-23 DIAGNOSIS — N18.2 TYPE 2 DIABETES MELLITUS WITH STAGE 2 CHRONIC KIDNEY DISEASE AND HYPERTENSION  (HCC): ICD-10-CM

## 2024-09-23 DIAGNOSIS — R80.9 MICROALBUMINURIA DUE TO TYPE 2 DIABETES MELLITUS  (HCC): ICD-10-CM

## 2024-09-23 DIAGNOSIS — E78.2 MIXED HYPERLIPIDEMIA: ICD-10-CM

## 2024-09-23 DIAGNOSIS — E11.22 TYPE 2 DIABETES MELLITUS WITH STAGE 2 CHRONIC KIDNEY DISEASE AND HYPERTENSION  (HCC): ICD-10-CM

## 2024-09-23 PROCEDURE — 99214 OFFICE O/P EST MOD 30 MIN: CPT | Performed by: FAMILY MEDICINE

## 2024-09-23 PROCEDURE — G0438 PPPS, INITIAL VISIT: HCPCS | Performed by: FAMILY MEDICINE

## 2024-09-23 RX ORDER — NYSTATIN 100000 [USP'U]/ML
500000 SUSPENSION ORAL 4 TIMES DAILY
Qty: 473 ML | Refills: 1 | Status: SHIPPED | OUTPATIENT
Start: 2024-09-23

## 2024-09-23 RX ORDER — ATORVASTATIN CALCIUM 20 MG/1
20 TABLET, FILM COATED ORAL EVERY EVENING
Qty: 100 TABLET | Refills: 1 | Status: SHIPPED | OUTPATIENT
Start: 2024-09-23

## 2024-09-23 NOTE — ASSESSMENT & PLAN NOTE
Lab Results   Component Value Date    HGBA1C 7.0 (H) 07/20/2024   Stable. Continue medications   Jardiance 25 mg   Metformin 1000 mg bid   Glimepiride 4 gm daily     Orders:    atorvastatin (LIPITOR) 20 mg tablet; Take 1 tablet (20 mg total) by mouth every evening    Empagliflozin (Jardiance) 25 MG TABS; Take 1 tablet (25 mg total) by mouth daily    Hemoglobin A1C; Future    CBC and Platelet; Future

## 2024-09-23 NOTE — PATIENT INSTRUCTIONS
Medicare Preventive Visit Patient Instructions  Thank you for completing your Welcome to Medicare Visit or Medicare Annual Wellness Visit today. Your next wellness visit will be due in one year (9/24/2025).  The screening/preventive services that you may require over the next 5-10 years are detailed below. Some tests may not apply to you based off risk factors and/or age. Screening tests ordered at today's visit but not completed yet may show as past due. Also, please note that scanned in results may not display below.  Preventive Screenings:  Service Recommendations Previous Testing/Comments   Colorectal Cancer Screening  Colonoscopy    Fecal Occult Blood Test (FOBT)/Fecal Immunochemical Test (FIT)  Fecal DNA/Cologuard Test  Flexible Sigmoidoscopy Age: 45-75 years old   Colonoscopy: every 10 years (May be performed more frequently if at higher risk)  OR  FOBT/FIT: every 1 year  OR  Cologuard: every 3 years  OR  Sigmoidoscopy: every 5 years  Screening may be recommended earlier than age 45 if at higher risk for colorectal cancer. Also, an individualized decision between you and your healthcare provider will decide whether screening between the ages of 76-85 would be appropriate. Colonoscopy: 08/20/2021  FOBT/FIT: Not on file  Cologuard: Not on file  Sigmoidoscopy: Not on file    Screening Current     Prostate Cancer Screening Individualized decision between patient and health care provider in men between ages of 55-69   Medicare will cover every 12 months beginning on the day after your 50th birthday PSA: No results in last 5 years           Hepatitis C Screening Once for adults born between 1945 and 1965  More frequently in patients at high risk for Hepatitis C Hep C Antibody: 06/03/2022    Screening Current   Diabetes Screening 1-2 times per year if you're at risk for diabetes or have pre-diabetes Fasting glucose: 126 mg/dL (7/20/2024)  A1C: 7.0 % (7/20/2024)  Screening Not Indicated  History Diabetes    Cholesterol Screening Once every 5 years if you don't have a lipid disorder. May order more often based on risk factors. Lipid panel: 06/03/2022  Screening Not Indicated  History Lipid Disorder      Other Preventive Screenings Covered by Medicare:  Abdominal Aortic Aneurysm (AAA) Screening: covered once if your at risk. You're considered to be at risk if you have a family history of AAA or a male between the age of 65-75 who smoking at least 100 cigarettes in your lifetime.  Lung Cancer Screening: covers low dose CT scan once per year if you meet all of the following conditions: (1) Age 55-77; (2) No signs or symptoms of lung cancer; (3) Current smoker or have quit smoking within the last 15 years; (4) You have a tobacco smoking history of at least 20 pack years (packs per day x number of years you smoked); (5) You get a written order from a healthcare provider.  Glaucoma Screening: covered annually if you're considered high risk: (1) You have diabetes OR (2) Family history of glaucoma OR (3)  aged 50 and older OR (4)  American aged 65 and older  Osteoporosis Screening: covered every 2 years if you meet one of the following conditions: (1) Have a vertebral abnormality; (2) On glucocorticoid therapy for more than 3 months; (3) Have primary hyperparathyroidism; (4) On osteoporosis medications and need to assess response to drug therapy.  HIV Screening: covered annually if you're between the age of 15-65. Also covered annually if you are younger than 15 and older than 65 with risk factors for HIV infection. For pregnant patients, it is covered up to 3 times per pregnancy.    Immunizations:  Immunization Recommendations   Influenza Vaccine Annual influenza vaccination during flu season is recommended for all persons aged >= 6 months who do not have contraindications   Pneumococcal Vaccine   * Pneumococcal conjugate vaccine = PCV13 (Prevnar 13), PCV15 (Vaxneuvance), PCV20 (Prevnar 20)  *  Pneumococcal polysaccharide vaccine = PPSV23 (Pneumovax) Adults 19-65 yo with certain risk factors or if 65+ yo  If never received any pneumonia vaccine: recommend Prevnar 20 (PCV20)  Give PCV20 if previously received 1 dose of PCV13 or PPSV23   Hepatitis B Vaccine 3 dose series if at intermediate or high risk (ex: diabetes, end stage renal disease, liver disease)   Respiratory syncytial virus (RSV) Vaccine - COVERED BY MEDICARE PART D  * RSVPreF3 (Arexvy) CDC recommends that adults 60 years of age and older may receive a single dose of RSV vaccine using shared clinical decision-making (SCDM)   Tetanus (Td) Vaccine - COST NOT COVERED BY MEDICARE PART B Following completion of primary series, a booster dose should be given every 10 years to maintain immunity against tetanus. Td may also be given as tetanus wound prophylaxis.   Tdap Vaccine - COST NOT COVERED BY MEDICARE PART B Recommended at least once for all adults. For pregnant patients, recommended with each pregnancy.   Shingles Vaccine (Shingrix) - COST NOT COVERED BY MEDICARE PART B  2 shot series recommended in those 19 years and older who have or will have weakened immune systems or those 50 years and older     Health Maintenance Due:      Topic Date Due   • Colorectal Cancer Screening  08/19/2024   • Lung Cancer Screening  03/29/2025   • Hepatitis C Screening  Completed     Immunizations Due:      Topic Date Due   • HIB Vaccine (1 of 1 - Risk 1-dose series) Never done     Advance Directives   What are advance directives?  Advance directives are legal documents that state your wishes and plans for medical care. These plans are made ahead of time in case you lose your ability to make decisions for yourself. Advance directives can apply to any medical decision, such as the treatments you want, and if you want to donate organs.   What are the types of advance directives?  There are many types of advance directives, and each state has rules about how to use  them. You may choose a combination of any of the following:  Living will:  This is a written record of the treatment you want. You can also choose which treatments you do not want, which to limit, and which to stop at a certain time. This includes surgery, medicine, IV fluid, and tube feedings.   Durable power of  for healthcare (DPAHC):  This is a written record that states who you want to make healthcare choices for you when you are unable to make them for yourself. This person, called a proxy, is usually a family member or a friend. You may choose more than 1 proxy.  Do not resuscitate (DNR) order:  A DNR order is used in case your heart stops beating or you stop breathing. It is a request not to have certain forms of treatment, such as CPR. A DNR order may be included in other types of advance directives.  Medical directive:  This covers the care that you want if you are in a coma, near death, or unable to make decisions for yourself. You can list the treatments you want for each condition. Treatment may include pain medicine, surgery, blood transfusions, dialysis, IV or tube feedings, and a ventilator (breathing machine).  Values history:  This document has questions about your views, beliefs, and how you feel and think about life. This information can help others choose the care that you would choose.  Why are advance directives important?  An advance directive helps you control your care. Although spoken wishes may be used, it is better to have your wishes written down. Spoken wishes can be misunderstood, or not followed. Treatments may be given even if you do not want them. An advance directive may make it easier for your family to make difficult choices about your care.   Weight Management   Why it is important to manage your weight:  Being overweight increases your risk of health conditions such as heart disease, high blood pressure, type 2 diabetes, and certain types of cancer. It can also  increase your risk for osteoarthritis, sleep apnea, and other respiratory problems. Aim for a slow, steady weight loss. Even a small amount of weight loss can lower your risk of health problems.  How to lose weight safely:  A safe and healthy way to lose weight is to eat fewer calories and get regular exercise. You can lose up about 1 pound a week by decreasing the number of calories you eat by 500 calories each day.   Healthy meal plan for weight management:  A healthy meal plan includes a variety of foods, contains fewer calories, and helps you stay healthy. A healthy meal plan includes the following:  Eat whole-grain foods more often.  A healthy meal plan should contain fiber. Fiber is the part of grains, fruits, and vegetables that is not broken down by your body. Whole-grain foods are healthy and provide extra fiber in your diet. Some examples of whole-grain foods are whole-wheat breads and pastas, oatmeal, brown rice, and bulgur.  Eat a variety of vegetables every day.  Include dark, leafy greens such as spinach, kale, sofie greens, and mustard greens. Eat yellow and orange vegetables such as carrots, sweet potatoes, and winter squash.   Eat a variety of fruits every day.  Choose fresh or canned fruit (canned in its own juice or light syrup) instead of juice. Fruit juice has very little or no fiber.  Eat low-fat dairy foods.  Drink fat-free (skim) milk or 1% milk. Eat fat-free yogurt and low-fat cottage cheese. Try low-fat cheeses such as mozzarella and other reduced-fat cheeses.  Choose meat and other protein foods that are low in fat.  Choose beans or other legumes such as split peas or lentils. Choose fish, skinless poultry (chicken or turkey), or lean cuts of red meat (beef or pork). Before you cook meat or poultry, cut off any visible fat.   Use less fat and oil.  Try baking foods instead of frying them. Add less fat, such as margarine, sour cream, regular salad dressing and mayonnaise to foods. Eat  fewer high-fat foods. Some examples of high-fat foods include french fries, doughnuts, ice cream, and cakes.  Eat fewer sweets.  Limit foods and drinks that are high in sugar. This includes candy, cookies, regular soda, and sweetened drinks.  Exercise:  Exercise at least 30 minutes per day on most days of the week. Some examples of exercise include walking, biking, dancing, and swimming. You can also fit in more physical activity by taking the stairs instead of the elevator or parking farther away from stores. Ask your healthcare provider about the best exercise plan for you.      © Copyright [employer] 2018 Information is for End User's use only and may not be sold, redistributed or otherwise used for commercial purposes. All illustrations and images included in CareNotes® are the copyrighted property of [employer] or [employer]    Patient Education     Type 2 diabetes   The Basics   Written by the doctors and editors at [facility]   What is type 2 diabetes? -- This is a disorder that disrupts the way the body uses sugar. It is sometimes called type 2 diabetes mellitus.  All of the cells in the body need sugar to work normally. Sugar gets into the cells with the help of a hormone called insulin. Insulin is made by the pancreas, an organ in the belly. If there is not enough insulin, or if cells in the body don't respond normally to insulin, sugar builds up in the blood. That is what happens to people with diabetes.  There are 2 different types of diabetes:   In type 1 diabetes, the pancreas makes little or no insulin.   In type 2 diabetes, the pancreas still makes some insulin, but the cells in the body stop responding normally. Eventually, the pancreas cannot make enough insulin to keep up.  Having excess body weight or obesity increases a person's risk of developing type 2 diabetes. But people without excess body weight can get diabetes, too.  What are the symptoms of type 2 diabetes? -- Type 2  "diabetes usually causes no symptoms. When symptoms do happen, they include:   Needing to urinate often   Intense thirst   Blurry vision  Can diabetes lead to other health problems? -- Yes. Type 2 diabetes might not make you feel sick. But if it is not managed, it can lead to serious problems over time, such as:   Heart attacks   Strokes   Kidney disease   Vision problems (or even blindness)   Pain or loss of feeling in the hands and feet   Needing to have fingers, toes, or other body parts removed (amputated)  How do I know if I have type 2 diabetes? -- Your doctor or nurse can do a blood test. There are 2 tests that can be used for this. Both involve measuring the amount of sugar in your blood, called your \"blood sugar\" or \"blood glucose\":   One of the tests measures your blood sugar at the time the blood sample is taken. This test is done in the morning. You can't eat or drink anything except water for at least 8 hours before the test.   The other test shows what your average blood sugar has been for the past 2 to 3 months. This blood test is called \"hemoglobin A1C\" or just \"A1C.\" It can be checked at any time of the day, even if you have recently eaten.  How is type 2 diabetes treated? -- The goals of treatment are to manage your blood sugar and lower the risk of future problems that can happen in people with diabetes.  Treatment might include:   Lifestyle changes - This is an important part of managing diabetes. It includes eating healthy foods and getting plenty of physical activity.   Medicines - There are a few medicines that help lower blood sugar. Some people need to take pills that help the body make more insulin or that help insulin do its job. Others need insulin shots.  Depending on what medicines you take, you might need to check your blood sugar regularly at home. But not everyone with type 2 diabetes needs to do this. Your doctor or nurse will tell you if you should be checking your blood sugar, and " "when and how to do this.  Sometimes, people with type 2 diabetes also need medicines to help prevent problems caused by the disease. For instance, medicines used to lower blood pressure can reduce the chances of a heart attack or stroke.   General medical care - It's also important to take care of other areas of your health. This includes watching your blood pressure and cholesterol levels. You should also get certain vaccines, such as vaccines to protect against the flu and coronavirus disease 2019 (\"COVID-19\"). Some people also need a vaccine to prevent pneumonia.  Can type 2 diabetes be prevented? -- Yes. To lower your chances of getting type 2 diabetes, the most important thing you can do is eat a healthy diet and get plenty of physical activity. This can help you lose weight if you are overweight. But eating well and being active are also good for your overall health. Even gentle activity, like walking, has benefits.  If you smoke, quitting can also lower your risk of type 2 diabetes. Quitting smoking can be difficult, but your doctor or nurse can help.  All topics are updated as new evidence becomes available and our peer review process is complete.  This topic retrieved from [employer] on: Apr 24, 2024.  Topic 29347 Version 23.0  Release: 32.3.2 - C32.113  © 2024 Up[employer], Inc[employer] and/or its affiliates. All rights reserved.  Consumer Information Use and Disclaimer   Disclaimer: This generalized information is a limited summary of diagnosis, treatment, and/or medication information. It is not meant to be comprehensive and should be used as a tool to help the user understand and/or assess potential diagnostic and treatment options. It does NOT include all information about conditions, treatments, medications, side effects, or risks that may apply to a specific patient. It is not intended to be medical advice or a substitute for the medical advice, diagnosis, or treatment of a health care provider based on the health " care provider's examination and assessment of a patient's specific and unique circumstances. Patients must speak with a health care provider for complete information about their health, medical questions, and treatment options, including any risks or benefits regarding use of medications. This information does not endorse any treatments or medications as safe, effective, or approved for treating a specific patient. [employer] Inc[employer] and its affiliates disclaim any warranty or liability relating to this information or the use thereof.The use of this information is governed by the Terms of Use, available at https://www.wolBrainscapeuwer.com/en/know/clinical-effectiveness-terms. 2024© [employer]Date, Inc. and its affiliates and/or licensors. All rights reserved.  Copyright   © 2024 [employer], [employer]. and/or its affiliates. All rights reserved.

## 2024-09-23 NOTE — PROGRESS NOTES
Ambulatory Visit  Name: Dawit Winchester      : 1960      MRN: [id number]  Encounter Provider: Nik Gutierrez MD  Encounter Date: 2024   Encounter department: [facility]    Assessment & Plan  Primary hypertension  BP Readings from Last 3 Encounters:   24 130/72   24 110/80   24 126/92       Blood pressure stable.  Continue lisinopril 40 mg daily.  Continue amlodipine 5 mg daily.  This was decreased to 5 mg by nephrology due to concern for proteinuria.  If proteinuria continues to elevate switch to spironolactone in addition to lisinopril    Orders:    CBC and Platelet; Future    Intertrigo    Orders:    nystatin (MYCOSTATIN) 500,000 units/5 mL suspension; Apply 5 mL (500,000 Units total) to the mouth or throat 4 (four) times a day    Type 2 diabetes mellitus with stage 2 chronic kidney disease and hypertension  (HCC)    Lab Results   Component Value Date    HGBA1C 7.0 (H) 2024   Stable. Continue medications   Jardiance 25 mg   Metformin 1000 mg bid   Glimepiride 4 gm daily     Orders:    atorvastatin (LIPITOR) 20 mg tablet; Take 1 tablet (20 mg total) by mouth every evening    Empagliflozin (Jardiance) 25 MG TABS; Take 1 tablet (25 mg total) by mouth daily    Hemoglobin A1C; Future    CBC and Platelet; Future    Anxiety  Primary caregiver for wife. Stress of her medical issues. Considering medical marijuana         Microalbuminuria due to type 2 diabetes mellitus  (HCC)    Lab Results   Component Value Date    HGBA1C 7.0 (H) 2024            History of colonic polyps    Orders:    Ambulatory Referral to Gastroenterology; Future    Mixed hyperlipidemia  Lab Results   Component Value Date    HDL 49 2022    LDLCALC 27 2022    TRIG 113 2022     Controlled, continue atorvastatin 20 mg    Orders:    atorvastatin (LIPITOR) 20 mg tablet; Take 1 tablet (20 mg total) by mouth every evening    Medicare annual wellness visit, initial         AWV  and follow-up completed today.  Reviewed previous labs.  Chronic conditions stable.  Continue current medications.    Depression Screening and Follow-up Plan: Patient was screened for depression during today's encounter. They screened negative with a PHQ-2 score of 0.      Preventive health issues were discussed with patient, and age appropriate screening tests were ordered as noted in patient's After Visit Summary. Personalized health advice and appropriate referrals for health education or preventive services given if needed, as noted in patient's After Visit Summary.    History of Present Illness     AWV and follow up. Doing well overall. Stable on medications. Due for colonoscopy.        Patient Care Team:  Nik Gutierrez MD as PCP - General (Family Medicine)  Ignacio Bernard MD as PCP - PCP-[facility] ([facility])  Nik Gutierrez MD as PCP - PCP-[facility] ([facility])  Nagi Rome MD (Nephrology)    Review of Systems   Constitutional:  Negative for activity change, fatigue and fever.   Eyes:  Negative for visual disturbance.   Respiratory:  Negative for shortness of breath.    Cardiovascular:  Negative for chest pain.   Gastrointestinal:  Negative for abdominal pain, constipation, diarrhea and nausea.   Endocrine: Negative for cold intolerance and heat intolerance.   Musculoskeletal:  Positive for arthralgias. Negative for back pain.   Skin:  Negative for rash.   Neurological:  Negative for headaches.   Psychiatric/Behavioral:  Negative for confusion. The patient is nervous/anxious.      Medical History Reviewed by provider this encounter:  Tobacco  Allergies  Meds  Problems  Med Hx  Surg Hx  Fam Hx       Annual Wellness Visit Questionnaire   Dawit is here for his Subsequent Wellness visit. Last Medicare Wellness visit information reviewed, patient interviewed, no change since last AWV.     Health Risk Assessment:   Patient rates overall health as good. Patient feels that their physical health  rating is same. Patient is satisfied with their life. Eyesight was rated as same. Hearing was rated as same. Patient feels that their emotional and mental health rating is same. Patients states they are never, rarely angry. Patient states they are sometimes unusually tired/fatigued. Pain experienced in the last 7 days has been some. Patient's pain rating has been 7/10. Patient states that he has experienced no weight loss or gain in last 6 months.     Depression Screening:   PHQ-2 Score: 0      Fall Risk Screening:   In the past year, patient has experienced: no history of falling in past year      Home Safety:  Patient does not have trouble with stairs inside or outside of their home. Patient has working smoke alarms and has working carbon monoxide detector. Home safety hazards include: none.     Nutrition:   Current diet is Regular.     Medications:   Patient is currently taking over-the-counter supplements. OTC medications include: see medication list. Patient is able to manage medications.     Activities of Daily Living (ADLs)/Instrumental Activities of Daily Living (IADLs):   Walk and transfer into and out of bed and chair?: Yes  Dress and groom yourself?: Yes    Bathe or shower yourself?: Yes    Feed yourself? Yes  Do your laundry/housekeeping?: Yes  Manage your money, pay your bills and track your expenses?: Yes  Make your own meals?: Yes    Do your own shopping?: Yes    Previous Hospitalizations:   Any hospitalizations or ED visits within the last 12 months?: No      Advance Care Planning:   Living will: No      PREVENTIVE SCREENINGS      Cardiovascular Screening:    General: Screening Not Indicated and History Lipid Disorder      Diabetes Screening:     General: Screening Not Indicated and History Diabetes      Colorectal Cancer Screening:     General: Screening Current      Prostate Cancer Screening:    General: Risks and Benefits Discussed      Abdominal Aortic Aneurysm (AAA) Screening:    Risk factors  "include: age between 65-74 yo and tobacco use        Lung Cancer Screening:     General: Screening Current      Hepatitis C Screening:    General: Screening Current    Screening, Brief Intervention, and Referral to Treatment (SBIRT)    Screening  Typical number of drinks in a day: 0  Typical number of drinks in a week: 0  Interpretation: Low risk drinking behavior.    Single Item Drug Screening:  How often have you used an illegal drug (including marijuana) or a prescription medication for non-medical reasons in the past year? never    Single Item Drug Screen Score: 0  Interpretation: Negative screen for possible drug use disorder    Annual Depression Screening  Time spent screening and evaluating the patient for depression during today's encounter was 5 minutes.    Other Counseling Topics:   Car/seat belt/driving safety, skin self-exam, sunscreen and calcium and vitamin D intake and regular weightbearing exercise.     Social Determinants of Health     Food Insecurity: No Food Insecurity (9/23/2024)    Hunger Vital Sign     Worried About Running Out of Food in the Last Year: Never true     Ran Out of Food in the Last Year: Never true   Transportation Needs: No Transportation Needs (9/23/2024)    PRAPARE - Transportation     Lack of Transportation (Medical): No     Lack of Transportation (Non-Medical): No   Housing Stability: Low Risk  (9/23/2024)    Housing Stability Vital Sign     Unable to Pay for Housing in the Last Year: No     Number of Times Moved in the Last Year: 0     Homeless in the Last Year: No   Utilities: Not At Risk (9/23/2024)    [facility] Utilities     Threatened with loss of utilities: No     No results found.    Objective     /72 (BP Location: Left arm, Patient Position: Sitting, Cuff Size: Large)   Pulse 103   Temp 97.6 °F (36.4 °C)   Resp 18   Ht 5' 9\" (1.753 m)   Wt 93.9 kg (207 lb)   SpO2 97%   BMI 30.57 kg/m²     Physical Exam  Vitals and nursing note reviewed.   Constitutional:       " Appearance: Normal appearance. He is well-developed.   HENT:      Head: Normocephalic and atraumatic.   Cardiovascular:      Rate and Rhythm: Normal rate and regular rhythm.   Pulmonary:      Effort: Pulmonary effort is normal.      Breath sounds: Normal breath sounds.   Abdominal:      General: Bowel sounds are normal.      Palpations: Abdomen is soft.   Musculoskeletal:      Cervical back: Normal range of motion.   Skin:     General: Skin is warm.   Neurological:      General: No focal deficit present.      Mental Status: He is alert.   Psychiatric:         Mood and Affect: Mood normal.         Speech: Speech normal.

## 2024-09-23 NOTE — ASSESSMENT & PLAN NOTE
Orders:    nystatin (MYCOSTATIN) 500,000 units/5 mL suspension; Apply 5 mL (500,000 Units total) to the mouth or throat 4 (four) times a day

## 2024-09-23 NOTE — ASSESSMENT & PLAN NOTE
Lab Results   Component Value Date    HDL 49 06/03/2022    LDLCALC 27 06/03/2022    TRIG 113 06/03/2022     Controlled, continue atorvastatin 20 mg    Orders:    atorvastatin (LIPITOR) 20 mg tablet; Take 1 tablet (20 mg total) by mouth every evening

## 2024-09-25 ENCOUNTER — PREP FOR PROCEDURE (OUTPATIENT)
Dept: GASTROENTEROLOGY | Facility: CLINIC | Age: 64
End: 2024-09-25

## 2024-09-25 ENCOUNTER — TELEPHONE (OUTPATIENT)
Dept: GASTROENTEROLOGY | Facility: CLINIC | Age: 64
End: 2024-09-25

## 2024-09-25 DIAGNOSIS — L30.4 INTERTRIGO: ICD-10-CM

## 2024-09-25 DIAGNOSIS — Z86.010 HISTORY OF COLON POLYPS: Primary | ICD-10-CM

## 2024-09-25 DIAGNOSIS — Z86.0100 HISTORY OF COLON POLYPS: Primary | ICD-10-CM

## 2024-09-25 RX ORDER — NYSTATIN 100000 [USP'U]/G
POWDER TOPICAL
Qty: 60 G | Refills: 1 | Status: SHIPPED | OUTPATIENT
Start: 2024-09-25

## 2024-09-25 NOTE — TELEPHONE ENCOUNTER
09/25/24  Screened by: Scarlet Hughes MA    Referring Provider recall    Pre- Screening:     There is no height or weight on file to calculate BMI.  Has patient been referred for a routine screening Colonoscopy? yes  Is the patient between 45-75 years old? yes      Previous Colonoscopy yes   If yes:    Date: 08/2021    Facility: [facility]    Reason:       SCHEDULING STAFF: If the patient is between 45yrs-49yrs, please advise patient to confirm benefits/coverage with their insurance company for a routine screening colonoscopy, some insurance carriers will only cover at 50yrs or older. If the patient is over 75years old, please schedule an office visit.     Does the patient want to see a Gastroenterologist prior to their procedure OR are they having any GI symptoms? no    Has the patient been hospitalized or had abdominal surgery in the past 6 months? no    Does the patient use supplemental oxygen? no    Does the patient take Coumadin, Lovenox, Plavix, Elliquis, Xarelto, or other blood thinning medication? no    Has the patient had a stroke, cardiac event, or stent placed in the past year? no    SCHEDULING STAFF: If patient answers NO to above questions, then schedule procedure. If patient answers YES to above questions, then schedule office appointment.     If patient is between 45yrs - 49yrs, please advise patient that we will have to confirm benefits & coverage with their insurance company for a routine screening colonoscopy.

## 2024-09-25 NOTE — TELEPHONE ENCOUNTER
Scheduled date of colonoscopy (as of today):  12/10/24  Physician performing colonoscopy: sona  Location of colonoscopy: [facility]  Bowel prep reviewed with patient: m/d  Instructions reviewed with patient by: emailed  Clearances: none

## 2024-09-25 NOTE — TELEPHONE ENCOUNTER
Patient called to request a refill for their Nystatin powder advised a refill was requested on 9/24/24 and is pending approval. Patient verbalized understanding and is in agreement.  Patient was seen on Monday and was prescribed nystatin suspension when he needed the nystatin powder. Please send to pharmacy as soon as possible.

## 2024-11-13 DIAGNOSIS — I10 ESSENTIAL HYPERTENSION: ICD-10-CM

## 2024-11-13 DIAGNOSIS — I10 PRIMARY HYPERTENSION: ICD-10-CM

## 2024-11-13 RX ORDER — AMLODIPINE BESYLATE 10 MG/1
10 TABLET ORAL DAILY
Qty: 90 TABLET | Refills: 1 | Status: SHIPPED | OUTPATIENT
Start: 2024-11-13

## 2024-11-13 RX ORDER — LISINOPRIL 40 MG/1
40 TABLET ORAL DAILY
Qty: 90 TABLET | Refills: 1 | Status: SHIPPED | OUTPATIENT
Start: 2024-11-13

## 2024-11-26 ENCOUNTER — ANESTHESIA (OUTPATIENT)
Dept: ANESTHESIOLOGY | Facility: HOSPITAL | Age: 64
End: 2024-11-26

## 2024-11-26 ENCOUNTER — ANESTHESIA EVENT (OUTPATIENT)
Dept: ANESTHESIOLOGY | Facility: HOSPITAL | Age: 64
End: 2024-11-26

## 2024-11-28 DIAGNOSIS — E11.9 TYPE 2 DIABETES MELLITUS WITHOUT COMPLICATION, WITH LONG-TERM CURRENT USE OF INSULIN (HCC): ICD-10-CM

## 2024-11-28 DIAGNOSIS — Z79.4 TYPE 2 DIABETES MELLITUS WITHOUT COMPLICATION, WITH LONG-TERM CURRENT USE OF INSULIN (HCC): ICD-10-CM

## 2024-12-10 ENCOUNTER — ANESTHESIA (OUTPATIENT)
Dept: GASTROENTEROLOGY | Facility: AMBULARY SURGERY CENTER | Age: 64
End: 2024-12-10
Payer: COMMERCIAL

## 2024-12-10 ENCOUNTER — HOSPITAL ENCOUNTER (OUTPATIENT)
Dept: GASTROENTEROLOGY | Facility: AMBULARY SURGERY CENTER | Age: 64
Setting detail: OUTPATIENT SURGERY
Discharge: HOME | End: 2024-12-10
Attending: INTERNAL MEDICINE
Payer: COMMERCIAL

## 2024-12-10 VITALS
WEIGHT: 192 LBS | TEMPERATURE: 96.6 F | SYSTOLIC BLOOD PRESSURE: 115 MMHG | HEART RATE: 54 BPM | BODY MASS INDEX: 28.44 KG/M2 | HEIGHT: 69 IN | OXYGEN SATURATION: 96 % | RESPIRATION RATE: 16 BRPM | DIASTOLIC BLOOD PRESSURE: 71 MMHG

## 2024-12-10 DIAGNOSIS — Z86.0100 HISTORY OF COLON POLYPS: ICD-10-CM

## 2024-12-10 LAB — GLUCOSE SERPL-MCNC: 157 MG/DL (ref 65–140)

## 2024-12-10 PROCEDURE — G0105 COLORECTAL SCRN; HI RISK IND: HCPCS | Performed by: INTERNAL MEDICINE

## 2024-12-10 PROCEDURE — 82948 REAGENT STRIP/BLOOD GLUCOSE: CPT

## 2024-12-10 RX ORDER — LIDOCAINE HYDROCHLORIDE 10 MG/ML
INJECTION, SOLUTION EPIDURAL; INFILTRATION; INTRACAUDAL; PERINEURAL AS NEEDED
Status: DISCONTINUED | OUTPATIENT
Start: 2024-12-10 | End: 2024-12-10

## 2024-12-10 RX ORDER — PROPOFOL 10 MG/ML
INJECTION, EMULSION INTRAVENOUS AS NEEDED
Status: DISCONTINUED | OUTPATIENT
Start: 2024-12-10 | End: 2024-12-10

## 2024-12-10 RX ORDER — SODIUM CHLORIDE, SODIUM LACTATE, POTASSIUM CHLORIDE, CALCIUM CHLORIDE 600; 310; 30; 20 MG/100ML; MG/100ML; MG/100ML; MG/100ML
INJECTION, SOLUTION INTRAVENOUS CONTINUOUS PRN
Status: DISCONTINUED | OUTPATIENT
Start: 2024-12-10 | End: 2024-12-10

## 2024-12-10 RX ADMIN — PROPOFOL 50 MG: 10 INJECTION, EMULSION INTRAVENOUS at 08:21

## 2024-12-10 RX ADMIN — PROPOFOL 30 MG: 10 INJECTION, EMULSION INTRAVENOUS at 08:31

## 2024-12-10 RX ADMIN — PROPOFOL 100 MG: 10 INJECTION, EMULSION INTRAVENOUS at 08:16

## 2024-12-10 RX ADMIN — LIDOCAINE HYDROCHLORIDE 50 MG: 10 INJECTION, SOLUTION EPIDURAL; INFILTRATION; INTRACAUDAL; PERINEURAL at 08:16

## 2024-12-10 RX ADMIN — PROPOFOL 30 MG: 10 INJECTION, EMULSION INTRAVENOUS at 08:28

## 2024-12-10 RX ADMIN — PROPOFOL 50 MG: 10 INJECTION, EMULSION INTRAVENOUS at 08:25

## 2024-12-10 RX ADMIN — SODIUM CHLORIDE, SODIUM LACTATE, POTASSIUM CHLORIDE, AND CALCIUM CHLORIDE: .6; .31; .03; .02 INJECTION, SOLUTION INTRAVENOUS at 08:13

## 2024-12-10 NOTE — ANESTHESIA PREPROCEDURE EVALUATION
Procedure:  COLONOSCOPY    Relevant Problems   CARDIO   (+) Granulomatosis with polyangiitis (HCC)   (+) Hyperlipidemia   (+) Primary hypertension   (+) Thoracic aortic ectasia (HCC)      ENDO   (+) Type 2 diabetes mellitus with stage 2 chronic kidney disease and hypertension  (HCC)      /RENAL   (+) Microalbuminuria due to type 2 diabetes mellitus  (HCC)      PULMONARY   (+) AZAR (obstructive sleep apnea)        Physical Exam    Airway    Mallampati score: II  TM Distance: >3 FB  Neck ROM: full     Dental   Comment: Upper dentures, no lower teeth  upper dentures    Cardiovascular      Pulmonary      Other Findings        Anesthesia Plan  ASA Score- 2     Anesthesia Type- IV sedation with anesthesia with ASA Monitors.         Additional Monitors:     Airway Plan:            Plan Factors-Exercise tolerance (METS): >4 METS.    Chart reviewed.    Patient summary reviewed.    Patient is not a current smoker.      Obstructive sleep apnea risk education given perioperatively.        Induction- intravenous.    Postoperative Plan-     Perioperative Resuscitation Plan - Level 1 - Full Code.       Informed Consent- Anesthetic plan and risks discussed with patient.  I personally reviewed this patient with the CRNA. Discussed and agreed on the Anesthesia Plan with the CRNA..

## 2024-12-10 NOTE — H&P
History and Physical - [facility]  Dawit Winchester 64 y.o. male MRN: [id number]    HPI: Dawit Winchester is a 64 y.o. year old male who presents with hx of colon polyps.       Review of Systems    Historical Information   Past Medical History:   Diagnosis Date    CPAP (continuous positive airway pressure) dependence     Diabetes mellitus (HCC)     GERD (gastroesophageal reflux disease)     Hyperlipidemia     Hypertension     Septic arthritis of knee, right (HCC) 2021    Sleep apnea     Squamous cell carcinoma of leg, right      Past Surgical History:   Procedure Laterality Date    CHOLECYSTECTOMY      COLONOSCOPY      FACIAL/NECK BIOPSY Right 2/10/2022    Procedure: EXCISION LESION RIGHT EAR LOBE;  Surgeon: Jose Juan Rider MD;  Location:  MAIN OR;  Service: General    HAND TENDON SURGERY      KNEE ARTHROSCOPY      Knee, right    SPLENECTOMY, PARTIAL  1968    accident    TONSILLECTOMY      WOUND DEBRIDEMENT Right 2021    Procedure: RIGHT KNEE ARTHROSCOPY W/IRRIGATION AND DEBRIDEMENT OF SEPTIC ARTHRITIS;  Surgeon: Raul Elliott MD;  Location: [facility];  Service: Orthopedics     Social History   Social History     Substance and Sexual Activity   Alcohol Use Yes    Comment: occasional     Social History     Substance and Sexual Activity   Drug Use Never     Social History     Tobacco Use   Smoking Status Former    Current packs/day: 0.00    Average packs/day: 1 pack/day for 48.0 years (48.0 ttl pk-yrs)    Types: Cigarettes    Quit date: 2023    Years since quittin.6   Smokeless Tobacco Never   Tobacco Comments    Quit smoking 2023     Family History   Problem Relation Age of Onset    Ovarian cancer Mother     Kidney disease Mother     Coronary artery disease Father     Diabetes Father     Pancreatic cancer Brother        Meds/Allergies     Not in a hospital admission.    Allergies   Allergen Reactions    Amoxicillin Rash    Amoxicillin-Pot Clavulanate Rash       Objective     BP  "155/92   Pulse 102   Temp (!) 96.6 °F (35.9 °C) (Temporal)   Resp 16   Ht 5' 9\" (1.753 m)   Wt 87.1 kg (192 lb)   SpO2 95%   BMI 28.35 kg/m²       PHYSICAL EXAM    Gen: NAD  CV: RRR  CHEST: Clear  ABD: soft, NT/ND  EXT: no edema  Neuro: AAO      ASSESSMENT/PLAN:  This is a 64 y.o. year old male here for  hx of colon polyps.     PLAN:   Procedure: colonoscopy      "

## 2024-12-10 NOTE — ANESTHESIA POSTPROCEDURE EVALUATION
Post-Op Assessment Note    CV Status:  Stable    Pain management: adequate       Mental Status:  Sleepy   Hydration Status:  Euvolemic   PONV Controlled:  Controlled   Airway Patency:  Patent     Post Op Vitals Reviewed: Yes    No anethesia notable event occurred.    Staff: CRNA           Last Filed PACU Vitals:  Vitals Value Taken Time   Temp 96.6 °F (35.9 °C) 12/10/24 0837   Pulse 114 12/10/24 0837   /63 12/10/24 0837   Resp 16 12/10/24 0837   SpO2 94 % 12/10/24 0837       Modified Denise:  Activity: 0 (12/10/2024  8:38 AM)  Respiration: 2 (12/10/2024  8:38 AM)  Circulation: 1 (12/10/2024  8:38 AM)  Consciousness: 0 (12/10/2024  8:38 AM)  Oxygen Saturation: 2 (12/10/2024  8:38 AM)  Modified Denise Score: 5 (12/10/2024  8:38 AM)

## 2025-01-10 LAB

## 2025-02-01 ENCOUNTER — APPOINTMENT (OUTPATIENT)
Dept: LAB | Facility: HOSPITAL | Age: 65
End: 2025-02-01
Attending: FAMILY MEDICINE
Payer: COMMERCIAL

## 2025-02-01 DIAGNOSIS — Z12.5 SCREENING FOR PROSTATE CANCER: ICD-10-CM

## 2025-02-01 DIAGNOSIS — E78.2 MIXED HYPERLIPIDEMIA: ICD-10-CM

## 2025-02-01 DIAGNOSIS — N18.2 TYPE 2 DIABETES MELLITUS WITH STAGE 2 CHRONIC KIDNEY DISEASE AND HYPERTENSION  (HCC): ICD-10-CM

## 2025-02-01 DIAGNOSIS — E11.29 PERSISTENT PROTEINURIA ASSOCIATED WITH TYPE 2 DIABETES MELLITUS  (HCC): ICD-10-CM

## 2025-02-01 DIAGNOSIS — E11.22 TYPE 2 DIABETES MELLITUS WITH STAGE 2 CHRONIC KIDNEY DISEASE AND HYPERTENSION  (HCC): ICD-10-CM

## 2025-02-01 DIAGNOSIS — I12.9 TYPE 2 DIABETES MELLITUS WITH STAGE 2 CHRONIC KIDNEY DISEASE AND HYPERTENSION  (HCC): ICD-10-CM

## 2025-02-01 DIAGNOSIS — R80.9 PERSISTENT PROTEINURIA ASSOCIATED WITH TYPE 2 DIABETES MELLITUS  (HCC): ICD-10-CM

## 2025-02-01 LAB
ALBUMIN SERPL BCG-MCNC: 4.6 G/DL (ref 3.5–5)
ALP SERPL-CCNC: 78 U/L (ref 34–104)
ALT SERPL W P-5'-P-CCNC: 20 U/L (ref 7–52)
ANION GAP SERPL CALCULATED.3IONS-SCNC: 12 MMOL/L (ref 4–13)
AST SERPL W P-5'-P-CCNC: 20 U/L (ref 13–39)
BILIRUB SERPL-MCNC: 0.76 MG/DL (ref 0.2–1)
BUN SERPL-MCNC: 12 MG/DL (ref 5–25)
CALCIUM SERPL-MCNC: 10 MG/DL (ref 8.4–10.2)
CHLORIDE SERPL-SCNC: 100 MMOL/L (ref 96–108)
CHOLEST SERPL-MCNC: 123 MG/DL (ref ?–200)
CO2 SERPL-SCNC: 28 MMOL/L (ref 21–32)
CREAT SERPL-MCNC: 1.2 MG/DL (ref 0.6–1.3)
CREAT UR-MCNC: 95.9 MG/DL
GFR SERPL CREATININE-BSD FRML MDRD: 62 ML/MIN/1.73SQ M
GLUCOSE P FAST SERPL-MCNC: 132 MG/DL (ref 65–99)
HDLC SERPL-MCNC: 53 MG/DL
LDLC SERPL CALC-MCNC: 44 MG/DL (ref 0–100)
MICROALBUMIN UR-MCNC: 1210.9 MG/L
MICROALBUMIN/CREAT 24H UR: 1263 MG/G CREATININE (ref 0–30)
NONHDLC SERPL-MCNC: 70 MG/DL
POTASSIUM SERPL-SCNC: 3.6 MMOL/L (ref 3.5–5.3)
PROT SERPL-MCNC: 8.2 G/DL (ref 6.4–8.4)
PSA SERPL-MCNC: 2.04 NG/ML (ref 0–4)
SODIUM SERPL-SCNC: 140 MMOL/L (ref 135–147)
TRIGL SERPL-MCNC: 128 MG/DL (ref ?–150)

## 2025-02-01 PROCEDURE — 82570 ASSAY OF URINE CREATININE: CPT

## 2025-02-01 PROCEDURE — G0103 PSA SCREENING: HCPCS

## 2025-02-01 PROCEDURE — 80061 LIPID PANEL: CPT

## 2025-02-01 PROCEDURE — 80053 COMPREHEN METABOLIC PANEL: CPT

## 2025-02-01 PROCEDURE — 86335 IMMUNFIX E-PHORSIS/URINE/CSF: CPT

## 2025-02-01 PROCEDURE — 84166 PROTEIN E-PHORESIS/URINE/CSF: CPT

## 2025-02-01 PROCEDURE — 36415 COLL VENOUS BLD VENIPUNCTURE: CPT

## 2025-02-01 PROCEDURE — 82043 UR ALBUMIN QUANTITATIVE: CPT

## 2025-02-03 LAB
ALBUMIN UR ELPH-MCNC: 78.5 %
ALPHA1 GLOB MFR UR ELPH: 5.1 %
ALPHA2 GLOB MFR UR ELPH: 3.3 %
B-GLOBULIN MFR UR ELPH: 6.6 %
GAMMA GLOB MFR UR ELPH: 6.5 %
INTERPRETATION UR IFE-IMP: NORMAL
PROT PATTERN UR ELPH-IMP: NORMAL
PROT UR-MCNC: 170 MG/DL

## 2025-02-03 PROCEDURE — 84166 PROTEIN E-PHORESIS/URINE/CSF: CPT | Performed by: PATHOLOGY

## 2025-02-03 PROCEDURE — 86335 IMMUNFIX E-PHORSIS/URINE/CSF: CPT | Performed by: PATHOLOGY

## 2025-02-10 ENCOUNTER — OFFICE VISIT (OUTPATIENT)
Dept: NEPHROLOGY | Facility: CLINIC | Age: 65
End: 2025-02-10
Payer: COMMERCIAL

## 2025-02-10 VITALS
BODY MASS INDEX: 29.62 KG/M2 | WEIGHT: 200 LBS | HEIGHT: 69 IN | DIASTOLIC BLOOD PRESSURE: 70 MMHG | SYSTOLIC BLOOD PRESSURE: 128 MMHG

## 2025-02-10 DIAGNOSIS — I12.9 TYPE 2 DIABETES MELLITUS WITH STAGE 2 CHRONIC KIDNEY DISEASE AND HYPERTENSION  (HCC): Primary | ICD-10-CM

## 2025-02-10 DIAGNOSIS — E11.29 MICROALBUMINURIA DUE TO TYPE 2 DIABETES MELLITUS  (HCC): ICD-10-CM

## 2025-02-10 DIAGNOSIS — N18.2 TYPE 2 DIABETES MELLITUS WITH STAGE 2 CHRONIC KIDNEY DISEASE AND HYPERTENSION  (HCC): Primary | ICD-10-CM

## 2025-02-10 DIAGNOSIS — E11.22 TYPE 2 DIABETES MELLITUS WITH STAGE 2 CHRONIC KIDNEY DISEASE AND HYPERTENSION  (HCC): Primary | ICD-10-CM

## 2025-02-10 DIAGNOSIS — I10 ESSENTIAL HYPERTENSION: ICD-10-CM

## 2025-02-10 DIAGNOSIS — R80.9 MICROALBUMINURIA DUE TO TYPE 2 DIABETES MELLITUS  (HCC): ICD-10-CM

## 2025-02-10 PROCEDURE — 99214 OFFICE O/P EST MOD 30 MIN: CPT | Performed by: INTERNAL MEDICINE

## 2025-02-10 PROCEDURE — G2211 COMPLEX E/M VISIT ADD ON: HCPCS | Performed by: INTERNAL MEDICINE

## 2025-02-10 RX ORDER — AMLODIPINE BESYLATE 5 MG/1
10 TABLET ORAL DAILY
Start: 2025-02-10

## 2025-02-10 NOTE — PROGRESS NOTES
Name: Dawit Winchester      : 1960      MRN: [id number]  Encounter Provider: Nagi Rome MD  Encounter Date: 2/10/2025   Encounter department: [facility] [place]  :  Assessment & Plan  Type 2 diabetes mellitus with stage 2 chronic kidney disease and hypertension  (HCC)    Lab Results   Component Value Date    HGBA1C 7.0 (H) 2024     Chronic Kidney Disease Stage II (G2A3)  --Imaging: Abdominal ultrasound from  reviewed.  Symmetrically sized kidneys with no masses or lesions.  --Urinalysis: Negative for blood.  Positive for protein and glucose.  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  Did discuss a biopsy with the patient  --Serologies: Complement C3 and C4 are normal.  ANCA titer was negative.  Anti-GBM was negative.  Kappa lambda ratio was normal at 1, serum protein electrophoresis showed no monoclonal bands, phospholipase A2 receptor antibodies were negative, urine protein electrophoresis showed no monoclonal bands  --RAAS Blockade: Lisinopril  --Reducing Cardiovascular Risk Factors:  Simvastatin+ Ezetimibe reduced atherosclerotic events in CKD (SHARP trial); Low dose aspirin safe (if no contraindications exist); smoking is an independent risk factor for Chronic Kidney Disease and progression, strongly recommend smoking cessation  --Sodium-Glucose Cotransporter-2 (SGLT2) Inhibitors:  May see an acute drop in the eGFR initially when starting the medication but then a stabilization of the renal function, with slower loss of renal function as compared to placebo.  Relative risk of end-stage renal disease, doubling of serum creatinine or death from renal causes were also found to be lower as compared to  placebo. (CREDENCE).  DAPA-CKD study, showed that patients with chronic kidney disease regardless of the presence or absence of diabetes the risk of composite of a sustained decline in the estimated GFR of at least 50%, end-stage renal disease or death from renal or cardiovascular causes was significantly lower with Dapagliflozin than with placebo. EMPEROR-Reduced study also showed beneficial effects. EMPA-CKD, among wide range of patients with CKD, who were at risk for progression, empagliflozin led a lower risk of kidney disease progression or death from cardiovascular causes than placebo.  If no contraindications exist I would recommend this medication added to the regiment. If eGFR < 60 cc/min (avoid ertugliflozin); avoid or caution with GFR < 20 mL/min, not an absolute contraindication, likely lower benefits.   --Finerenone: In patients with chronic kidney disease with type 2 diabetes, treatment led to lower risks of CKD progression and cardiovascular events than placebo. (FIDELIO-DKD)  --Status: Renal function remained stable with worsening proteinuria but subnephrotic range.  Negative serologies.  Blood pressure and diabetes seems to be well-controlled.  Also on SGLT2 inhibitor  --Management/Recommendations: Continue lifestyle modifications including low 2 g sodium diet weight loss exercise and diet.  Continue current amlodipine and lisinopril dose.  Check repeat albumin/creatinine ratio in 3 months and again in 6 months     Hypertension  -- Stage II (American College of Cardiology/American Heart Association)  -- with underlying chronic kidney disease and obesity, former smoker  -- Body mass index is 29.5 kg/m².  -- Volume status: Euvolemic  -- Etiology: Primary hypertension, obesity and former smoker  -- Secondary Work-Up: No clinical indication  -- Target Goal: < 130/80 (ACC/AHA, CKD with proteinuria, CKD in diabetics) ; if tolerated can target SBP < 120 mmHg in high cardiovascular risk ( Age > 75, CKD,  CVD, No Diabetics SPRINT)  -- Lifestyle Modifications: DASH Diet, Weight Loss for ideal body weight, 45 mins of cardiovascular exercise 3 times a week as tolerated, and if no contraindications exist, smoking cessation, limit alcohol use, avoid NSAIDS, monitor blood pressure at home  -- Status: Blood pressure under excellent control  -- Current antihypertensive regiment: Lisinopril 40 mg daily, amlodipine 5 mg daily  -- Changes: Reinforce lifestyle modifications continue low 2 g sodium diet lisinopril and amlodipine at 40 and 5 mg respectively.  If the proteinuria worsens changed amlodipine to spironolactone.     Diabetes mellitus type 2  -hemoglobin A1c: 6.5 (A1C values may be falsely elevated or decreased in those with CKD, assay method should be certified by National glycohemoglobin standardization Program). Target A1C less then 7 (will need to be individualized for each patient), and would utilize A1C  in conjunction with continuous glucose monitoring (CGM).  It should also be noted that the A1c with more advanced kidney disease would be inaccurate due to decreased red blood cell life along with anemia.  -current medications: Jardiance 25 mg daily, metformin 1000 mg twice a day, glimepiride 4 mg daily  -proteinuria: Yes  -retinopathy: None  -neuropathy: Not reported  -please follow with an ophthalmologist and podiatrist every year  -continued self monitoring of blood glucose at home  -Management in CKD Recommendations:   Metformin:  Avoid if creatinine clearance is less than 30 cc/min (concern for lactic acidosis)  Sodium-Glucose Cotransporter-2 (SGLT2) Inhibitors:  May see an acute drop in the eGFR initially when starting the medication but then a stabilization of the renal function, with slower loss of renal function as compared to placebo.  Relative risk of end-stage renal disease, doubling of serum creatinine or death from renal causes were also found to be lower as compared to placebo. (CREDENCE).  DAPA-CKD  study, showed that patients with chronic kidney disease regardless of the presence or absence of diabetes the risk of composite of a sustained decline in the estimated GFR of at least 50%, end-stage renal disease or death from renal or cardiovascular causes was significantly lower with Dapagliflozin than with placebo. EMPEROR-Reduced study also showed beneficial effects. EMPA-CKD, among wide range of patients with CKD, who were at risk for progression, empagliflozin led a lower risk of kidney disease progression or death from cardiovascular causes than placebo.  If no contraindications exist I would recommend this medication added to the regiment. If eGFR < 60 cc/min (avoid ertugliflozin); avoid or caution with GFR < 20 mL/min, not an absolute contraindication, likely lower benefits   Sulfonylureas:  Preferred short-acting (glipizide, glimepiride, repaglinide), relatively safe in patients with non dialysis CKD  Thiazolidinedones/Alpha-Gluosidase inhibitors/Dipeptidyl peptidase-4 inhibitors:  Generally not considered first-line agents in CKD (limited data in long-term safety and efficacy)  Insulin:  Starting dose may need to be lower than what would ordinarily be used, as there is a decrease metabolism of insulin (no dose adjustment if the GFR > 50 mL/min, dose should be reduced to 75% of baseline if GFR 10-50 mL/min, and 50% baseline if GFR < 10 mL/min)  Finerenone: Patients with CKD with type 2 diabetes, treatment led to lower risks of CKD progression and cardiovascular events than placebo (FIDELIO-DKD). Avoid or caution if serum potassium more then 4.8.    Orders:    Albumin / creatinine urine ratio; Future    Albumin / creatinine urine ratio; Future    Comprehensive metabolic panel; Future    Hemoglobin A1C; Future    CBC and Platelet; Future    Microalbuminuria due to type 2 diabetes mellitus  (HCC)    Lab Results   Component Value Date    HGBA1C 7.0 (H) 07/20/2024     Proteinuria  -- presumed to be secondary to  diabetic kidney disease, also with a longstanding smoking history  -- 24 hour urine protein or urine protein creatinine ratio: Albumin/creatinine ratio 1.2 g/g  -- Current Blood Pressure: Blood pressure overall at target  -- current anti proteinuric medications: Lisinopril  -- Interventions: The proteinuria has been slowly worsening.  Serologies have been negative.  I did discuss about a renal biopsy.  Continue lisinopril and reduced dose of amlodipine.  We may need to add spironolactone if this continues to trend up.  His diabetes seems to be well-controlled along with his blood pressure.  Repeated again in 3 months and again in 6 months if it still worsening we could add spironolactone in place of amlodipine and consider a renal biopsy.  -- General Recommendations:  RAAS Blockade with high dose ARB or ACEI for target blood pressure < 130/80 as tolerated, with spironolactone as a good adjunct for anti proteinuric effect.  Management of hypervolemia for blood pressure control as needed.  Avoid combination ACEI + ARB, due to high adverse effects (ONTARGET study)  Direct Renin Inhibitor + ACEI or ARB has FDA black box warning for patients with diabetes and GFR < 60 cc/min due to risk for non fatal stroke, renal complications, hyperkalemia and hypotension (ALTITUDE study)  Moderate dietary protein restriction 0.8 gm/kg  Recommend statin therapy if no contraindications.   Recommend 1, 25 vitamin-D such as Paricalcitol if appropropriate (VITAL study)    Orders:    Albumin / creatinine urine ratio; Future    Albumin / creatinine urine ratio; Future    Comprehensive metabolic panel; Future    Hemoglobin A1C; Future    CBC and Platelet; Future    Essential hypertension  -- Blood pressure at target  --Continue amlodipine 5 mg daily, lisinopril 40 mg daily  --If proteinuria worsens we will change amlodipine to spironolactone 12.5 mg daily  --Non-smoker  -- Low 2 g sodium diet  -- BMI 29.5    Orders:    amLODIPine (NORVASC) 5  mg tablet; Take 2 tablets (10 mg total) by mouth daily    Albumin / creatinine urine ratio; Future    Comprehensive metabolic panel; Future    Hemoglobin A1C; Future    CBC and Platelet; Future        History of Present Illness   HPI  Dawit Winchester is a 64 y.o. male who presents follow-up of chronic kidney disease stage II with proteinuria and hypertension.  Since her last visit no ER visits or hospitalizations.  Blood pressure at home has been ranging 1 20-1 30 systolic.  No chest pain or shortness of breath no swelling of the legs.    He reports a low salt diet.  No NSAIDs.  He is no longer smoking    We discussed about his worsening proteinuria.  He has no systemic symptoms.  Serologies have been negative.    We reviewed his blood work from February 1 which included UPEP albumin/creatinine ratio lipid panel PSA immunofixation.  PSA is normal.  Immunofixation showed no monoclonal bands.  UPEP was negative.  Albumin/creatinine ratio was worsened to 1.2 g/g.  CMP shows a creatinine stable at 1.2 mg/dL with normal electrolytes.    History obtained from: patient    Review of Systems   Constitutional:  Negative for activity change and fever.   Respiratory:  Negative for cough, chest tightness, shortness of breath and wheezing.    Cardiovascular:  Negative for chest pain and leg swelling.   Gastrointestinal:  Negative for abdominal pain, diarrhea, nausea and vomiting.   Endocrine: Negative for polyuria.   Genitourinary:  Negative for difficulty urinating, dysuria, flank pain, frequency and urgency.   Skin:  Negative for rash.   Neurological:  Negative for dizziness, syncope, light-headedness and headaches.     Current Outpatient Medications on File Prior to Visit   Medication Sig Dispense Refill    atorvastatin (LIPITOR) 20 mg tablet Take 1 tablet (20 mg total) by mouth every evening 100 tablet 1    Empagliflozin (Jardiance) 25 MG TABS Take 1 tablet (25 mg total) by mouth daily 90 tablet 1    glimepiride (AMARYL) 4 mg  "tablet TAKE 1 TABLET BY MOUTH DAILY WITH BREAKFAST 90 tablet 1    lisinopril (ZESTRIL) 40 mg tablet TAKE 1 TABLET (40 MG TOTAL) BY MOUTH DAILY 90 tablet 1    metFORMIN (GLUCOPHAGE) 1000 MG tablet TAKE 1 TABLET BY MOUTH TWICE A  tablet 1    nystatin (MYCOSTATIN) powder APPLY TOPICALLY 4 TIMES A DAY. 60 g 1    omeprazole (PriLOSEC OTC) 20 MG tablet Take 20 mg by mouth 4 (four) times a week      OneTouch Verio test strip USE 1 EACH DAILY TEST DAILY 100 strip 1    [DISCONTINUED] amLODIPine (NORVASC) 10 mg tablet TAKE 1 TABLET BY MOUTH EVERY DAY (Patient taking differently: Take 5 mg by mouth daily) 90 tablet 1    nystatin (MYCOSTATIN) 500,000 units/5 mL suspension Apply 5 mL (500,000 Units total) to the mouth or throat 4 (four) times a day (Patient not taking: Reported on 2/10/2025) 473 mL 1     No current facility-administered medications on file prior to visit.         Objective   Ht 5' 9\" (1.753 m)   Wt 90.7 kg (200 lb)   BMI 29.53 kg/m²      Physical Exam  Vitals and nursing note reviewed.   Constitutional:       General: He is not in acute distress.     Appearance: He is well-developed. He is not diaphoretic.   HENT:      Head: Normocephalic and atraumatic.   Eyes:      General: No scleral icterus.     Pupils: Pupils are equal, round, and reactive to light.   Cardiovascular:      Rate and Rhythm: Normal rate and regular rhythm.      Heart sounds: Normal heart sounds. No murmur heard.     No friction rub. No gallop.   Pulmonary:      Effort: Pulmonary effort is normal. No respiratory distress.      Breath sounds: Normal breath sounds. No wheezing or rales.   Chest:      Chest wall: No tenderness.   Abdominal:      General: Bowel sounds are normal. There is no distension.      Palpations: Abdomen is soft.      Tenderness: There is no abdominal tenderness. There is no rebound.   Musculoskeletal:         General: Normal range of motion.      Cervical back: Normal range of motion and neck supple.   Skin:     " Findings: No rash.   Neurological:      Mental Status: He is alert and oriented to person, place, and time.         Administrative Statements   I have spent a total time of 30 minutes in caring for this patient on the day of the visit/encounter including Impressions, Counseling / Coordination of care, Documenting in the medical record, Reviewing / ordering tests, medicine, procedures  , and Obtaining or reviewing history  .

## 2025-02-10 NOTE — ASSESSMENT & PLAN NOTE
Lab Results   Component Value Date    HGBA1C 7.0 (H) 07/20/2024     Chronic Kidney Disease Stage II (G2A3)  --Imaging: Abdominal ultrasound from 2020 reviewed.  Symmetrically sized kidneys with no masses or lesions.  --Urinalysis: Negative for blood.  Positive for protein and glucose.  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  Did discuss a biopsy with the patient  --Serologies: Complement C3 and C4 are normal.  ANCA titer was negative.  Anti-GBM was negative.  Kappa lambda ratio was normal at 1, serum protein electrophoresis showed no monoclonal bands, phospholipase A2 receptor antibodies were negative, urine protein electrophoresis showed no monoclonal bands  --RAAS Blockade: Lisinopril  --Reducing Cardiovascular Risk Factors:  Simvastatin+ Ezetimibe reduced atherosclerotic events in CKD (SHARP trial); Low dose aspirin safe (if no contraindications exist); smoking is an independent risk factor for Chronic Kidney Disease and progression, strongly recommend smoking cessation  --Sodium-Glucose Cotransporter-2 (SGLT2) Inhibitors:  May see an acute drop in the eGFR initially when starting the medication but then a stabilization of the renal function, with slower loss of renal function as compared to placebo.  Relative risk of end-stage renal disease, doubling of serum creatinine or death from renal causes were also found to be lower as compared to placebo. (CREDENCE).  DAPA-CKD study, showed that patients with chronic kidney disease regardless of the presence or absence of diabetes the risk of composite of a sustained decline in the estimated GFR of at least 50%, end-stage renal disease or death from renal or cardiovascular causes was  significantly lower with Dapagliflozin than with placebo. EMPEROR-Reduced study also showed beneficial effects. EMPA-CKD, among wide range of patients with CKD, who were at risk for progression, empagliflozin led a lower risk of kidney disease progression or death from cardiovascular causes than placebo.  If no contraindications exist I would recommend this medication added to the regiment. If eGFR < 60 cc/min (avoid ertugliflozin); avoid or caution with GFR < 20 mL/min, not an absolute contraindication, likely lower benefits.   --Finerenone: In patients with chronic kidney disease with type 2 diabetes, treatment led to lower risks of CKD progression and cardiovascular events than placebo. (FIDELIO-DKD)  --Status: Renal function remained stable with worsening proteinuria but subnephrotic range.  Negative serologies.  Blood pressure and diabetes seems to be well-controlled.  Also on SGLT2 inhibitor  --Management/Recommendations: Continue lifestyle modifications including low 2 g sodium diet weight loss exercise and diet.  Continue current amlodipine and lisinopril dose.  Check repeat albumin/creatinine ratio in 3 months and again in 6 months     Hypertension  -- Stage II (American College of Cardiology/American Heart Association)  -- with underlying chronic kidney disease and obesity, former smoker  -- Body mass index is 29.5 kg/m².  -- Volume status: Euvolemic  -- Etiology: Primary hypertension, obesity and former smoker  -- Secondary Work-Up: No clinical indication  -- Target Goal: < 130/80 (ACC/AHA, CKD with proteinuria, CKD in diabetics) ; if tolerated can target SBP < 120 mmHg in high cardiovascular risk ( Age > 75, CKD, CVD, No Diabetics SPRINT)  -- Lifestyle Modifications: DASH Diet, Weight Loss for ideal body weight, 45 mins of cardiovascular exercise 3 times a week as tolerated, and if no contraindications exist, smoking cessation, limit alcohol use, avoid NSAIDS, monitor blood pressure at home  -- Status:  Blood pressure under excellent control  -- Current antihypertensive regiment: Lisinopril 40 mg daily, amlodipine 5 mg daily  -- Changes: Reinforce lifestyle modifications continue low 2 g sodium diet lisinopril and amlodipine at 40 and 5 mg respectively.  If the proteinuria worsens changed amlodipine to spironolactone.     Diabetes mellitus type 2  -hemoglobin A1c: 6.5 (A1C values may be falsely elevated or decreased in those with CKD, assay method should be certified by National glycohemoglobin standardization Program). Target A1C less then 7 (will need to be individualized for each patient), and would utilize A1C  in conjunction with continuous glucose monitoring (CGM).  It should also be noted that the A1c with more advanced kidney disease would be inaccurate due to decreased red blood cell life along with anemia.  -current medications: Jardiance 25 mg daily, metformin 1000 mg twice a day, glimepiride 4 mg daily  -proteinuria: Yes  -retinopathy: None  -neuropathy: Not reported  -please follow with an ophthalmologist and podiatrist every year  -continued self monitoring of blood glucose at home  -Management in CKD Recommendations:   Metformin:  Avoid if creatinine clearance is less than 30 cc/min (concern for lactic acidosis)  Sodium-Glucose Cotransporter-2 (SGLT2) Inhibitors:  May see an acute drop in the eGFR initially when starting the medication but then a stabilization of the renal function, with slower loss of renal function as compared to placebo.  Relative risk of end-stage renal disease, doubling of serum creatinine or death from renal causes were also found to be lower as compared to placebo. (CREDENCE).  DAPA-CKD study, showed that patients with chronic kidney disease regardless of the presence or absence of diabetes the risk of composite of a sustained decline in the estimated GFR of at least 50%, end-stage renal disease or death from renal or cardiovascular causes was significantly lower with  Dapagliflozin than with placebo. EMPEROR-Reduced study also showed beneficial effects. EMPA-CKD, among wide range of patients with CKD, who were at risk for progression, empagliflozin led a lower risk of kidney disease progression or death from cardiovascular causes than placebo.  If no contraindications exist I would recommend this medication added to the regiment. If eGFR < 60 cc/min (avoid ertugliflozin); avoid or caution with GFR < 20 mL/min, not an absolute contraindication, likely lower benefits   Sulfonylureas:  Preferred short-acting (glipizide, glimepiride, repaglinide), relatively safe in patients with non dialysis CKD  Thiazolidinedones/Alpha-Gluosidase inhibitors/Dipeptidyl peptidase-4 inhibitors:  Generally not considered first-line agents in CKD (limited data in long-term safety and efficacy)  Insulin:  Starting dose may need to be lower than what would ordinarily be used, as there is a decrease metabolism of insulin (no dose adjustment if the GFR > 50 mL/min, dose should be reduced to 75% of baseline if GFR 10-50 mL/min, and 50% baseline if GFR < 10 mL/min)  Finerenone: Patients with CKD with type 2 diabetes, treatment led to lower risks of CKD progression and cardiovascular events than placebo (FIDELIO-DKD). Avoid or caution if serum potassium more then 4.8.    Orders:    Albumin / creatinine urine ratio; Future    Albumin / creatinine urine ratio; Future    Comprehensive metabolic panel; Future    Hemoglobin A1C; Future    CBC and Platelet; Future

## 2025-02-10 NOTE — ASSESSMENT & PLAN NOTE
Lab Results   Component Value Date    HGBA1C 7.0 (H) 07/20/2024     Proteinuria  -- presumed to be secondary to diabetic kidney disease, also with a longstanding smoking history  -- 24 hour urine protein or urine protein creatinine ratio: Albumin/creatinine ratio 1.2 g/g  -- Current Blood Pressure: Blood pressure overall at target  -- current anti proteinuric medications: Lisinopril  -- Interventions: The proteinuria has been slowly worsening.  Serologies have been negative.  I did discuss about a renal biopsy.  Continue lisinopril and reduced dose of amlodipine.  We may need to add spironolactone if this continues to trend up.  His diabetes seems to be well-controlled along with his blood pressure.  Repeated again in 3 months and again in 6 months if it still worsening we could add spironolactone in place of amlodipine and consider a renal biopsy.  -- General Recommendations:  RAAS Blockade with high dose ARB or ACEI for target blood pressure < 130/80 as tolerated, with spironolactone as a good adjunct for anti proteinuric effect.  Management of hypervolemia for blood pressure control as needed.  Avoid combination ACEI + ARB, due to high adverse effects (ONTARGET study)  Direct Renin Inhibitor + ACEI or ARB has FDA black box warning for patients with diabetes and GFR < 60 cc/min due to risk for non fatal stroke, renal complications, hyperkalemia and hypotension (ALTITUDE study)  Moderate dietary protein restriction 0.8 gm/kg  Recommend statin therapy if no contraindications.   Recommend 1, 25 vitamin-D such as Paricalcitol if appropropriate (VITAL study)    Orders:    Albumin / creatinine urine ratio; Future    Albumin / creatinine urine ratio; Future    Comprehensive metabolic panel; Future    Hemoglobin A1C; Future    CBC and Platelet; Future

## 2025-02-10 NOTE — PATIENT INSTRUCTIONS
1.)  Low 2 g sodium diet    2.)  Monitor weights at home    3.)  Avoid NSAIDs (ibuprofen, Motrin, Advil, Aleve, naproxen)    4.)  Monitor blood pressure at home, call if blood pressure greater than 150/90 persistently    5.) I will plan to discuss all results including blood work, and/or imaging at our next visit, unless there is an urgent indication, in which case I will call you earlier. If you have any questions or concerns about your results, please feel free to call our office.    6.) Check urine protein in 3 months and 6 months

## 2025-03-03 ENCOUNTER — PATIENT MESSAGE (OUTPATIENT)
Dept: FAMILY MEDICINE CLINIC | Facility: CLINIC | Age: 65
End: 2025-03-03

## 2025-03-10 DIAGNOSIS — H66.90 ACUTE OTITIS MEDIA, UNSPECIFIED OTITIS MEDIA TYPE: Primary | ICD-10-CM

## 2025-03-10 RX ORDER — AZITHROMYCIN 250 MG/1
TABLET, FILM COATED ORAL
Qty: 6 TABLET | Refills: 0 | Status: SHIPPED | OUTPATIENT
Start: 2025-03-10 | End: 2025-03-15

## 2025-03-10 NOTE — PATIENT COMMUNICATION
Called patient 3/10/25 to inform him the provider sent over his antibiotic to [facility] in [place].

## 2025-03-14 ENCOUNTER — APPOINTMENT (OUTPATIENT)
Dept: LAB | Facility: HOSPITAL | Age: 65
End: 2025-03-14
Attending: FAMILY MEDICINE
Payer: COMMERCIAL

## 2025-03-14 DIAGNOSIS — E11.22 TYPE 2 DIABETES MELLITUS WITH STAGE 2 CHRONIC KIDNEY DISEASE AND HYPERTENSION  (HCC): ICD-10-CM

## 2025-03-14 DIAGNOSIS — I12.9 TYPE 2 DIABETES MELLITUS WITH STAGE 2 CHRONIC KIDNEY DISEASE AND HYPERTENSION  (HCC): ICD-10-CM

## 2025-03-14 DIAGNOSIS — I10 PRIMARY HYPERTENSION: ICD-10-CM

## 2025-03-14 DIAGNOSIS — N18.2 TYPE 2 DIABETES MELLITUS WITH STAGE 2 CHRONIC KIDNEY DISEASE AND HYPERTENSION  (HCC): ICD-10-CM

## 2025-03-14 LAB
ERYTHROCYTE [DISTWIDTH] IN BLOOD BY AUTOMATED COUNT: 15.8 % (ref 11.6–15.1)
EST. AVERAGE GLUCOSE BLD GHB EST-MCNC: 174 MG/DL
HBA1C MFR BLD: 7.7 %
HCT VFR BLD AUTO: 45 % (ref 36.5–49.3)
HGB BLD-MCNC: 14.6 G/DL (ref 12–17)
MCH RBC QN AUTO: 27.2 PG (ref 26.8–34.3)
MCHC RBC AUTO-ENTMCNC: 32.4 G/DL (ref 31.4–37.4)
MCV RBC AUTO: 84 FL (ref 82–98)
PLATELET # BLD AUTO: 272 THOUSANDS/UL (ref 149–390)
PMV BLD AUTO: 10.1 FL (ref 8.9–12.7)
RBC # BLD AUTO: 5.36 MILLION/UL (ref 3.88–5.62)
WBC # BLD AUTO: 7.38 THOUSAND/UL (ref 4.31–10.16)

## 2025-03-14 PROCEDURE — 85027 COMPLETE CBC AUTOMATED: CPT

## 2025-03-14 PROCEDURE — 83036 HEMOGLOBIN GLYCOSYLATED A1C: CPT

## 2025-03-14 PROCEDURE — 36415 COLL VENOUS BLD VENIPUNCTURE: CPT

## 2025-03-24 ENCOUNTER — OFFICE VISIT (OUTPATIENT)
Dept: FAMILY MEDICINE CLINIC | Facility: CLINIC | Age: 65
End: 2025-03-24
Payer: COMMERCIAL

## 2025-03-24 VITALS
TEMPERATURE: 98.4 F | WEIGHT: 203 LBS | HEART RATE: 106 BPM | RESPIRATION RATE: 16 BRPM | HEIGHT: 69 IN | OXYGEN SATURATION: 96 % | BODY MASS INDEX: 30.07 KG/M2 | DIASTOLIC BLOOD PRESSURE: 82 MMHG | SYSTOLIC BLOOD PRESSURE: 132 MMHG

## 2025-03-24 DIAGNOSIS — I12.9 TYPE 2 DIABETES MELLITUS WITH STAGE 2 CHRONIC KIDNEY DISEASE AND HYPERTENSION  (HCC): ICD-10-CM

## 2025-03-24 DIAGNOSIS — I10 PRIMARY HYPERTENSION: ICD-10-CM

## 2025-03-24 DIAGNOSIS — E78.2 MIXED HYPERLIPIDEMIA: ICD-10-CM

## 2025-03-24 DIAGNOSIS — R10.31 BILATERAL GROIN PAIN: ICD-10-CM

## 2025-03-24 DIAGNOSIS — M17.0 PRIMARY OSTEOARTHRITIS OF BOTH KNEES: ICD-10-CM

## 2025-03-24 DIAGNOSIS — R10.32 BILATERAL GROIN PAIN: ICD-10-CM

## 2025-03-24 DIAGNOSIS — M31.30 GRANULOMATOSIS WITH POLYANGIITIS, UNSPECIFIED WHETHER RENAL INVOLVEMENT (HCC): ICD-10-CM

## 2025-03-24 DIAGNOSIS — I10 ESSENTIAL HYPERTENSION: ICD-10-CM

## 2025-03-24 DIAGNOSIS — H66.006 RECURRENT ACUTE SUPPURATIVE OTITIS MEDIA WITHOUT SPONTANEOUS RUPTURE OF TYMPANIC MEMBRANE OF BOTH SIDES: Primary | ICD-10-CM

## 2025-03-24 DIAGNOSIS — E11.22 TYPE 2 DIABETES MELLITUS WITH STAGE 2 CHRONIC KIDNEY DISEASE AND HYPERTENSION  (HCC): ICD-10-CM

## 2025-03-24 DIAGNOSIS — N18.2 TYPE 2 DIABETES MELLITUS WITH STAGE 2 CHRONIC KIDNEY DISEASE AND HYPERTENSION  (HCC): ICD-10-CM

## 2025-03-24 DIAGNOSIS — R91.8 MULTIPLE PULMONARY NODULES: ICD-10-CM

## 2025-03-24 PROCEDURE — 99214 OFFICE O/P EST MOD 30 MIN: CPT | Performed by: FAMILY MEDICINE

## 2025-03-24 PROCEDURE — G2211 COMPLEX E/M VISIT ADD ON: HCPCS | Performed by: FAMILY MEDICINE

## 2025-03-24 RX ORDER — ATORVASTATIN CALCIUM 20 MG/1
20 TABLET, FILM COATED ORAL EVERY EVENING
Qty: 100 TABLET | Refills: 1 | Status: SHIPPED | OUTPATIENT
Start: 2025-03-24

## 2025-03-24 RX ORDER — SULFAMETHOXAZOLE AND TRIMETHOPRIM 800; 160 MG/1; MG/1
1 TABLET ORAL EVERY 12 HOURS SCHEDULED
Qty: 14 TABLET | Refills: 0 | Status: SHIPPED | OUTPATIENT
Start: 2025-03-24 | End: 2025-03-31

## 2025-03-24 RX ORDER — GLIMEPIRIDE 4 MG/1
4 TABLET ORAL
Qty: 90 TABLET | Refills: 1 | Status: SHIPPED | OUTPATIENT
Start: 2025-03-24

## 2025-03-24 RX ORDER — LISINOPRIL 40 MG/1
40 TABLET ORAL DAILY
Qty: 90 TABLET | Refills: 1 | Status: SHIPPED | OUTPATIENT
Start: 2025-03-24

## 2025-03-24 NOTE — ASSESSMENT & PLAN NOTE
Continue atorvastatin 20 mg daily  Orders:    atorvastatin (LIPITOR) 20 mg tablet; Take 1 tablet (20 mg total) by mouth every evening

## 2025-03-24 NOTE — ASSESSMENT & PLAN NOTE
BP Readings from Last 3 Encounters:   03/24/25 132/82   02/10/25 128/70   12/10/24 115/71       Blood pressure stable.  Continue lisinopril 40 mg daily.  Continue amlodipine 5 mg daily.  This was decreased to 5 mg by nephrology due to concern for proteinuria.  If proteinuria continues to elevate switch to spironolactone in addition to lisinopril    Orders:    lisinopril (ZESTRIL) 40 mg tablet; Take 1 tablet (40 mg total) by mouth daily

## 2025-03-24 NOTE — PROGRESS NOTES
Name: Dawit Winchester      : 1960      MRN: [id number]  Encounter Provider: Nik Gutierrez MD  Encounter Date: 3/24/2025   Encounter department: [facility]  :  Assessment & Plan  Recurrent acute suppurative otitis media without spontaneous rupture of tympanic membrane of both sides  Start antibiotic for ear infection.  Orders:    sulfamethoxazole-trimethoprim (BACTRIM DS) 800-160 mg per tablet; Take 1 tablet by mouth every 12 (twelve) hours for 7 days    Granulomatosis with polyangiitis, unspecified whether renal involvement (HCC)  Stable. Follows with nephrology          Primary osteoarthritis of both knees  Ongoing bilateral knee pain.  Noted arthritis on previous imaging.  Repeat x-rays of both knees.  Can consider steroid injections following x-rays    Orders:    XR knee 3 vw left non injury; Future    XR knee 3 vw right non injury; Future    Multiple pulmonary nodules  Multiple pulmonary nodules on previous scans.  Repeat CT scan  Orders:    CT lung nodule follow-up; Future    Type 2 diabetes mellitus with stage 2 chronic kidney disease and hypertension  (HCC)    Lab Results   Component Value Date    HGBA1C 7.7 (H) 2025   Stable. Continue medications   Jardiance 25 mg   Metformin 1000 mg bid   Glimepiride 4 gm daily     Orders:    Albumin / creatinine urine ratio; Future    Comprehensive metabolic panel; Future    Hemoglobin A1C; Future    glimepiride (AMARYL) 4 mg tablet; Take 1 tablet (4 mg total) by mouth daily with breakfast    Empagliflozin (Jardiance) 25 MG TABS; Take 1 tablet (25 mg total) by mouth daily    atorvastatin (LIPITOR) 20 mg tablet; Take 1 tablet (20 mg total) by mouth every evening    metFORMIN (GLUCOPHAGE) 1000 MG tablet; Take 1 tablet (1,000 mg total) by mouth 2 (two) times a day      Primary hypertension  BP Readings from Last 3 Encounters:   25 132/82   02/10/25 128/70   12/10/24 115/71       Blood pressure stable.  Continue lisinopril 40 mg  "daily.  Continue amlodipine 5 mg daily.  This was decreased to 5 mg by nephrology due to concern for proteinuria.  If proteinuria continues to elevate switch to spironolactone in addition to lisinopril    Orders:    lisinopril (ZESTRIL) 40 mg tablet; Take 1 tablet (40 mg total) by mouth daily      Bilateral groin pain  Reporting bilateral groin pain.  Obtain ultrasound rule out hernia  Orders:    US groin/inguinal area; Future    Mixed hyperlipidemia  Continue atorvastatin 20 mg daily  Orders:    atorvastatin (LIPITOR) 20 mg tablet; Take 1 tablet (20 mg total) by mouth every evening    Essential hypertension  Blood pressure remains well-controlled.  Continue amlodipine         Follow-up in 6 months for annual wellness     History of Present Illness   Patient presents with:  Follow-up: 6m  Ear pain left and now in right   Patient is also reporting bilateral groin pain  Bilateral knee pain.  He is currently under stress with his wife's health.  Limited diet and exercise.  Attributing elevated A1c to poor diet.          Review of Systems   Constitutional:  Negative for activity change, fatigue and fever.   HENT:  Positive for ear pain.    Eyes:  Negative for visual disturbance.   Respiratory:  Negative for shortness of breath.    Cardiovascular:  Negative for chest pain.   Gastrointestinal:  Negative for abdominal pain, constipation, diarrhea and nausea.   Endocrine: Negative for cold intolerance and heat intolerance.   Musculoskeletal:  Negative for back pain.        Groin pain   Skin:  Negative for rash.   Neurological:  Negative for headaches.   Psychiatric/Behavioral:  Negative for confusion.        Objective   /82 (BP Location: Left arm, Patient Position: Sitting, Cuff Size: Large)   Pulse (!) 106   Temp 98.4 °F (36.9 °C) (Temporal)   Resp 16   Ht 5' 9\" (1.753 m)   Wt 92.1 kg (203 lb)   SpO2 96%   BMI 29.98 kg/m²      Physical Exam    "

## 2025-03-24 NOTE — ASSESSMENT & PLAN NOTE
Lab Results   Component Value Date    HGBA1C 7.7 (H) 03/14/2025   Stable. Continue medications   Jardiance 25 mg   Metformin 1000 mg bid   Glimepiride 4 gm daily     Orders:    Albumin / creatinine urine ratio; Future    Comprehensive metabolic panel; Future    Hemoglobin A1C; Future    glimepiride (AMARYL) 4 mg tablet; Take 1 tablet (4 mg total) by mouth daily with breakfast    Empagliflozin (Jardiance) 25 MG TABS; Take 1 tablet (25 mg total) by mouth daily    atorvastatin (LIPITOR) 20 mg tablet; Take 1 tablet (20 mg total) by mouth every evening    metFORMIN (GLUCOPHAGE) 1000 MG tablet; Take 1 tablet (1,000 mg total) by mouth 2 (two) times a day

## 2025-03-24 NOTE — ASSESSMENT & PLAN NOTE
Multiple pulmonary nodules on previous scans.  Repeat CT scan  Orders:    CT lung nodule follow-up; Future

## 2025-03-24 NOTE — PATIENT INSTRUCTIONS
"Patient Education     Type 2 diabetes   The Basics   Written by the doctors and editors at [facility]   What is type 2 diabetes? -- This is a disorder that disrupts the way the body uses sugar. It is sometimes called type 2 diabetes mellitus.  All of the cells in the body need sugar to work normally. Sugar gets into the cells with the help of a hormone called insulin. Insulin is made by the pancreas, an organ in the belly. If there is not enough insulin, or if cells in the body don't respond normally to insulin, sugar builds up in the blood. That is what happens to people with diabetes.  There are 2 different types of diabetes:   In type 1 diabetes, the pancreas makes little or no insulin.   In type 2 diabetes, the pancreas still makes some insulin, but the cells in the body stop responding normally. Eventually, the pancreas cannot make enough insulin to keep up.  Having excess body weight or obesity increases a person's risk of developing type 2 diabetes. But people without excess body weight can get diabetes, too.  What are the symptoms of type 2 diabetes? -- Type 2 diabetes usually causes no symptoms. When symptoms do happen, they include:   Needing to urinate often   Intense thirst   Blurry vision  Can diabetes lead to other health problems? -- Yes. Type 2 diabetes might not make you feel sick. But if it is not managed, it can lead to serious problems over time, such as:   Heart attacks   Strokes   Kidney disease   Vision problems (or even blindness)   Pain or loss of feeling in the hands and feet   Needing to have fingers, toes, or other body parts removed (amputated)  How do I know if I have type 2 diabetes? -- Your doctor or nurse can do a blood test. There are 2 tests that can be used for this. Both involve measuring the amount of sugar in your blood, called your \"blood sugar\" or \"blood glucose\":   One of the tests measures your blood sugar at the time the blood sample is taken. This test is done in the " "morning. You can't eat or drink anything except water for at least 8 hours before the test.   The other test shows what your average blood sugar has been for the past 2 to 3 months. This blood test is called \"hemoglobin A1C\" or just \"A1C.\" It can be checked at any time of the day, even if you have recently eaten.  How is type 2 diabetes treated? -- The goals of treatment are to manage your blood sugar and lower the risk of future problems that can happen in people with diabetes.  Treatment might include:   Lifestyle changes - This is an important part of managing diabetes. It includes eating healthy foods and getting plenty of physical activity.   Medicines - There are a few medicines that help lower blood sugar. Some people need to take pills that help the body make more insulin or that help insulin do its job. Others need insulin shots.  Depending on what medicines you take, you might need to check your blood sugar regularly at home. But not everyone with type 2 diabetes needs to do this. Your doctor or nurse will tell you if you should be checking your blood sugar, and when and how to do this.  Sometimes, people with type 2 diabetes also need medicines to help prevent problems caused by the disease. For instance, medicines used to lower blood pressure can reduce the chances of a heart attack or stroke.   General medical care - It's also important to take care of other areas of your health. This includes watching your blood pressure and cholesterol levels. You should also get certain vaccines, such as vaccines to protect against the flu and coronavirus disease 2019 (\"COVID-19\"). Some people also need a vaccine to prevent pneumonia.  Can type 2 diabetes be prevented? -- Yes. To lower your chances of getting type 2 diabetes, the most important thing you can do is eat a healthy diet and get plenty of physical activity. This can help you lose weight if you are overweight. But eating well and being active are also good " for your overall health. Even gentle activity, like walking, has benefits.  If you smoke, quitting can also lower your risk of type 2 diabetes. Quitting smoking can be difficult, but your doctor or nurse can help.  All topics are updated as new evidence becomes available and our peer review process is complete.  This topic retrieved from [employer] on: Apr 24, 2024.  Topic 20732 Version 23.0  Release: 32.3.2 - C32.113  © 2024 UpToDate, Inc. and/or its affiliates. All rights reserved.  Consumer Information Use and Disclaimer   Disclaimer: This generalized information is a limited summary of diagnosis, treatment, and/or medication information. It is not meant to be comprehensive and should be used as a tool to help the user understand and/or assess potential diagnostic and treatment options. It does NOT include all information about conditions, treatments, medications, side effects, or risks that may apply to a specific patient. It is not intended to be medical advice or a substitute for the medical advice, diagnosis, or treatment of a health care provider based on the health care provider's examination and assessment of a patient's specific and unique circumstances. Patients must speak with a health care provider for complete information about their health, medical questions, and treatment options, including any risks or benefits regarding use of medications. This information does not endorse any treatments or medications as safe, effective, or approved for treating a specific patient. UpToDate, Inc. and its affiliates disclaim any warranty or liability relating to this information or the use thereof.The use of this information is governed by the Terms of Use, available at https://www.wolterskluwer.com/en/know/clinical-effectiveness-terms. 2024© UpToDate, Inc. and its affiliates and/or licensors. All rights reserved.  Copyright   © 2024 UpToDate, Inc. and/or its affiliates. All rights reserved.

## 2025-03-27 ENCOUNTER — HOSPITAL ENCOUNTER (OUTPATIENT)
Dept: RADIOLOGY | Facility: HOSPITAL | Age: 65
End: 2025-03-27
Payer: COMMERCIAL

## 2025-03-27 DIAGNOSIS — M17.0 PRIMARY OSTEOARTHRITIS OF BOTH KNEES: ICD-10-CM

## 2025-03-27 PROCEDURE — 73562 X-RAY EXAM OF KNEE 3: CPT

## 2025-03-28 ENCOUNTER — RESULTS FOLLOW-UP (OUTPATIENT)
Dept: FAMILY MEDICINE CLINIC | Facility: CLINIC | Age: 65
End: 2025-03-28

## 2025-03-28 DIAGNOSIS — R16.0 HEPATOMEGALY: Primary | ICD-10-CM

## 2025-04-02 ENCOUNTER — HOSPITAL ENCOUNTER (OUTPATIENT)
Dept: CT IMAGING | Facility: HOSPITAL | Age: 65
Discharge: HOME | End: 2025-04-02
Attending: FAMILY MEDICINE
Payer: COMMERCIAL

## 2025-04-02 ENCOUNTER — HOSPITAL ENCOUNTER (OUTPATIENT)
Dept: ULTRASOUND IMAGING | Facility: HOSPITAL | Age: 65
Discharge: HOME | End: 2025-04-02
Attending: FAMILY MEDICINE
Payer: COMMERCIAL

## 2025-04-02 DIAGNOSIS — R91.8 MULTIPLE PULMONARY NODULES: ICD-10-CM

## 2025-04-02 DIAGNOSIS — R10.32 BILATERAL GROIN PAIN: ICD-10-CM

## 2025-04-02 DIAGNOSIS — R10.31 BILATERAL GROIN PAIN: ICD-10-CM

## 2025-04-02 PROCEDURE — 76705 ECHO EXAM OF ABDOMEN: CPT

## 2025-04-02 PROCEDURE — 71250 CT THORAX DX C-: CPT

## 2025-04-10 ENCOUNTER — TELEPHONE (OUTPATIENT)
Age: 65
End: 2025-04-10

## 2025-04-10 NOTE — TELEPHONE ENCOUNTER
Patient is calling in regarding ear pain which continues. He states he has called and sent in messages and no one has called him back. He is upset and would like an urgent call back

## 2025-04-11 ENCOUNTER — OFFICE VISIT (OUTPATIENT)
Dept: FAMILY MEDICINE CLINIC | Facility: CLINIC | Age: 65
End: 2025-04-11
Payer: COMMERCIAL

## 2025-04-11 VITALS
HEIGHT: 69 IN | WEIGHT: 205 LBS | OXYGEN SATURATION: 98 % | SYSTOLIC BLOOD PRESSURE: 144 MMHG | DIASTOLIC BLOOD PRESSURE: 70 MMHG | RESPIRATION RATE: 16 BRPM | HEART RATE: 98 BPM | BODY MASS INDEX: 30.36 KG/M2 | TEMPERATURE: 97.6 F

## 2025-04-11 DIAGNOSIS — H92.02 CHRONIC LEFT EAR PAIN: ICD-10-CM

## 2025-04-11 DIAGNOSIS — R10.9 CHRONIC ABDOMINAL PAIN: ICD-10-CM

## 2025-04-11 DIAGNOSIS — R10.32 BILATERAL GROIN PAIN: Primary | ICD-10-CM

## 2025-04-11 DIAGNOSIS — G89.29 CHRONIC ABDOMINAL PAIN: ICD-10-CM

## 2025-04-11 DIAGNOSIS — R16.0 HEPATOMEGALY: ICD-10-CM

## 2025-04-11 DIAGNOSIS — R10.31 BILATERAL GROIN PAIN: Primary | ICD-10-CM

## 2025-04-11 DIAGNOSIS — G89.29 CHRONIC LEFT EAR PAIN: ICD-10-CM

## 2025-04-11 PROCEDURE — G2211 COMPLEX E/M VISIT ADD ON: HCPCS | Performed by: FAMILY MEDICINE

## 2025-04-11 PROCEDURE — 99214 OFFICE O/P EST MOD 30 MIN: CPT | Performed by: FAMILY MEDICINE

## 2025-04-11 NOTE — TELEPHONE ENCOUNTER
If patient still has ear pain after using antibiotics I would like to see him in office to reevaluate.

## 2025-04-11 NOTE — PROGRESS NOTES
"Name: Dawit Winchester      : 1960      MRN: [id number]  Encounter Provider: Nik Gutierrez MD  Encounter Date: 2025   Encounter department: [facility]  :  Assessment & Plan  Bilateral groin pain  Negative US but he continues to have sharp bilateral groin pain.   Obtain CT abd pelvis  CMP ordered   Orders:    Comprehensive metabolic panel; Future    CT abdomen pelvis w contrast; Future    Chronic abdominal pain  Ongoing chronic abdominal pain   Dull and sharp pain that fluctuates.   No change in bowel movements   Pain has worsened over the past month. Obtain CT   Orders:    Comprehensive metabolic panel; Future    CT abdomen pelvis w contrast; Future    Hepatomegaly  Noted on CT chest   Orders:    Comprehensive metabolic panel; Future    CT abdomen pelvis w contrast; Future    Chronic left ear pain  Completed 2 round of antibiotics   No signs of infection on exam. Pain comes and goes.                History of Present Illness   Patient presents today for follow-up.  Continues to have left-sided ear pain  Continues to have lower abdominal and groin pain.  Ultrasound was negative for hernia.  Also notes urinary frequency      Review of Systems   HENT:  Positive for ear pain.    Gastrointestinal:  Positive for abdominal pain (Groin and lower abdominal pain).   Genitourinary:  Positive for urgency.       Objective   /70 (BP Location: Left arm, Patient Position: Sitting, Cuff Size: Large)   Pulse 98   Temp 97.6 °F (36.4 °C) (Temporal)   Resp 16   Ht 5' 9\" (1.753 m)   Wt 93 kg (205 lb)   SpO2 98%   BMI 30.27 kg/m²      Physical Exam  Vitals and nursing note reviewed.   Constitutional:       Appearance: Normal appearance. He is well-developed.   HENT:      Head: Normocephalic and atraumatic.      Right Ear: No middle ear effusion. Tympanic membrane is scarred. Tympanic membrane is not erythematous or bulging.      Left Ear:  No middle ear effusion. Tympanic membrane is " scarred. Tympanic membrane is not erythematous or bulging.   Cardiovascular:      Rate and Rhythm: Normal rate and regular rhythm.   Pulmonary:      Effort: Pulmonary effort is normal.      Breath sounds: Normal breath sounds.   Abdominal:      General: Bowel sounds are normal.      Palpations: Abdomen is soft.      Tenderness: There is abdominal tenderness. There is no right CVA tenderness or left CVA tenderness.   Musculoskeletal:      Cervical back: Normal range of motion.   Skin:     General: Skin is warm.   Neurological:      General: No focal deficit present.      Mental Status: He is alert.   Psychiatric:         Mood and Affect: Mood normal.         Speech: Speech normal.

## 2025-04-12 ENCOUNTER — APPOINTMENT (OUTPATIENT)
Dept: LAB | Facility: HOSPITAL | Age: 65
End: 2025-04-12
Payer: COMMERCIAL

## 2025-04-12 DIAGNOSIS — R10.9 CHRONIC ABDOMINAL PAIN: ICD-10-CM

## 2025-04-12 DIAGNOSIS — R10.31 BILATERAL GROIN PAIN: ICD-10-CM

## 2025-04-12 DIAGNOSIS — R80.9 MICROALBUMINURIA DUE TO TYPE 2 DIABETES MELLITUS  (HCC): ICD-10-CM

## 2025-04-12 DIAGNOSIS — R16.0 HEPATOMEGALY: ICD-10-CM

## 2025-04-12 DIAGNOSIS — R10.32 BILATERAL GROIN PAIN: ICD-10-CM

## 2025-04-12 DIAGNOSIS — I12.9 TYPE 2 DIABETES MELLITUS WITH STAGE 2 CHRONIC KIDNEY DISEASE AND HYPERTENSION  (HCC): ICD-10-CM

## 2025-04-12 DIAGNOSIS — E11.29 MICROALBUMINURIA DUE TO TYPE 2 DIABETES MELLITUS  (HCC): ICD-10-CM

## 2025-04-12 DIAGNOSIS — G89.29 CHRONIC ABDOMINAL PAIN: ICD-10-CM

## 2025-04-12 DIAGNOSIS — N18.2 TYPE 2 DIABETES MELLITUS WITH STAGE 2 CHRONIC KIDNEY DISEASE AND HYPERTENSION  (HCC): ICD-10-CM

## 2025-04-12 DIAGNOSIS — E11.22 TYPE 2 DIABETES MELLITUS WITH STAGE 2 CHRONIC KIDNEY DISEASE AND HYPERTENSION  (HCC): ICD-10-CM

## 2025-04-12 LAB
ALBUMIN SERPL BCG-MCNC: 4.5 G/DL (ref 3.5–5)
ALP SERPL-CCNC: 71 U/L (ref 34–104)
ALT SERPL W P-5'-P-CCNC: 29 U/L (ref 7–52)
ANION GAP SERPL CALCULATED.3IONS-SCNC: 11 MMOL/L (ref 4–13)
AST SERPL W P-5'-P-CCNC: 24 U/L (ref 13–39)
BILIRUB SERPL-MCNC: 0.53 MG/DL (ref 0.2–1)
BUN SERPL-MCNC: 13 MG/DL (ref 5–25)
CALCIUM SERPL-MCNC: 9.5 MG/DL (ref 8.4–10.2)
CHLORIDE SERPL-SCNC: 101 MMOL/L (ref 96–108)
CO2 SERPL-SCNC: 26 MMOL/L (ref 21–32)
CREAT SERPL-MCNC: 1.11 MG/DL (ref 0.6–1.3)
CREAT UR-MCNC: 86.1 MG/DL
GFR SERPL CREATININE-BSD FRML MDRD: 68 ML/MIN/1.73SQ M
GLUCOSE P FAST SERPL-MCNC: 162 MG/DL (ref 65–99)
MICROALBUMIN UR-MCNC: 1234 MG/L
MICROALBUMIN/CREAT 24H UR: 1433 MG/G CREATININE (ref 0–30)
POTASSIUM SERPL-SCNC: 3.9 MMOL/L (ref 3.5–5.3)
PROT SERPL-MCNC: 7.9 G/DL (ref 6.4–8.4)
SODIUM SERPL-SCNC: 138 MMOL/L (ref 135–147)

## 2025-04-12 PROCEDURE — 80053 COMPREHEN METABOLIC PANEL: CPT

## 2025-04-12 PROCEDURE — 82043 UR ALBUMIN QUANTITATIVE: CPT

## 2025-04-12 PROCEDURE — 82570 ASSAY OF URINE CREATININE: CPT

## 2025-04-12 PROCEDURE — 36415 COLL VENOUS BLD VENIPUNCTURE: CPT

## 2025-04-16 ENCOUNTER — OFFICE VISIT (OUTPATIENT)
Dept: PODIATRY | Facility: CLINIC | Age: 65
End: 2025-04-16
Payer: COMMERCIAL

## 2025-04-16 VITALS — OXYGEN SATURATION: 96 % | HEIGHT: 69 IN | HEART RATE: 99 BPM | BODY MASS INDEX: 30.36 KG/M2 | WEIGHT: 205 LBS

## 2025-04-16 DIAGNOSIS — Z79.4 TYPE 2 DIABETES MELLITUS WITH MICROALBUMINURIA, WITH LONG-TERM CURRENT USE OF INSULIN (HCC): Primary | ICD-10-CM

## 2025-04-16 DIAGNOSIS — R80.9 TYPE 2 DIABETES MELLITUS WITH MICROALBUMINURIA, WITH LONG-TERM CURRENT USE OF INSULIN (HCC): Primary | ICD-10-CM

## 2025-04-16 DIAGNOSIS — E11.29 TYPE 2 DIABETES MELLITUS WITH MICROALBUMINURIA, WITH LONG-TERM CURRENT USE OF INSULIN (HCC): Primary | ICD-10-CM

## 2025-04-16 PROCEDURE — 99213 OFFICE O/P EST LOW 20 MIN: CPT | Performed by: PODIATRIST

## 2025-04-16 NOTE — PROGRESS NOTES
Name: Dawit Winchester      : 1960      MRN: [id number]  Encounter Provider: Reinier Wells DPM  Encounter Date: 2025   Encounter department: [facility]    Assessment & Plan     1. Type 2 diabetes mellitus with microalbuminuria, with long-term current use of insulin (HCC)      Patient presents for her yearly follow-up for diabetic foot exam.  A diabetic foot examination was completed, please see below.  Patient is at low risk for ulceration.  He does have some new onset numbness and tingling the past year, however this is minor and only happens at night.    I personally reviewed patient's hemoglobin A1c from 3/14 as well as 720.  Both values were 7.7 and 7% respectively.  I reviewed patient's family medicine notes from the  I educated patient on the effects of diabetes to the lower extremities.  Patient expressed understanding.    Patient can follow-up in 1 year for diabetic foot examination.    No follow-ups on file.    Subjective     Patient presents for follow-up for yearly diabetic foot exam.  He states that he has had some issues with his hemoglobin A1c as he has had some issues with the kidneys and feels that it has gone up due to issues with medications.  States that he does have some minor numbness and tingling.  States he does have a history of tobacco abuse, however he quit 2 years ago.        Constitutional:  Negative for chills and fever.   Respiratory:  Negative for chest tightness and shortness of breath.    Gastrointestinal: Notes recurrent stomach pain    Current Outpatient Medications on File Prior to Visit   Medication Sig    amLODIPine (NORVASC) 5 mg tablet Take 2 tablets (10 mg total) by mouth daily    atorvastatin (LIPITOR) 20 mg tablet Take 1 tablet (20 mg total) by mouth every evening    Empagliflozin (Jardiance) 25 MG TABS Take 1 tablet (25 mg total) by mouth daily    glimepiride (AMARYL) 4 mg tablet Take 1 tablet (4 mg total) by mouth daily with  "breakfast    lisinopril (ZESTRIL) 40 mg tablet Take 1 tablet (40 mg total) by mouth daily    metFORMIN (GLUCOPHAGE) 1000 MG tablet Take 1 tablet (1,000 mg total) by mouth 2 (two) times a day    omeprazole (PriLOSEC OTC) 20 MG tablet Take 20 mg by mouth 4 (four) times a week    OneTouch Verio test strip USE 1 EACH DAILY TEST DAILY    nystatin (MYCOSTATIN) 500,000 units/5 mL suspension Apply 5 mL (500,000 Units total) to the mouth or throat 4 (four) times a day (Patient not taking: Reported on 2/10/2025)    nystatin (MYCOSTATIN) powder APPLY TOPICALLY 4 TIMES A DAY. (Patient not taking: No sig reported)       Objective     Pulse 99   Ht 5' 9\" (1.753 m)   Wt 93 kg (205 lb)   SpO2 96%   BMI 30.27 kg/m²     Patient's shoes and socks removed.    Right Foot/Ankle   Right Foot Inspection  Skin Exam: skin normal. Skin not intact, no dry skin, no warmth, no callus, no erythema, no maceration, no abnormal color, no pre-ulcer, no ulcer and no callus.     Toe Exam: ROM and strength within normal limits. No swelling    Sensory   Vibration: diminished  Proprioception: intact  Monofilament testing: intact    Vascular  Capillary refills: < 3 seconds  The right DP pulse is 2+. The right PT pulse is 2+.     Left Foot/Ankle  Left Foot Inspection  Skin Exam: skin normal. Skin not intact, no dry skin, no warmth, no erythema, no maceration, normal color, no pre-ulcer, no ulcer and no callus.     Toe Exam: ROM and strength within normal limits. No swelling.     Sensory   Vibration: diminished  Proprioception: intact  Monofilament testing: intact    Vascular  Capillary refills: < 3 seconds  The left DP pulse is 1+. The left PT pulse is 1+.     Assign Risk Category  No deformity present  No loss of protective sensation  No weak pulses  Risk: 0        "

## 2025-04-17 ENCOUNTER — HOSPITAL ENCOUNTER (OUTPATIENT)
Dept: CT IMAGING | Facility: HOSPITAL | Age: 65
End: 2025-04-17
Attending: FAMILY MEDICINE
Payer: COMMERCIAL

## 2025-04-17 DIAGNOSIS — G89.29 CHRONIC ABDOMINAL PAIN: ICD-10-CM

## 2025-04-17 DIAGNOSIS — R10.32 BILATERAL GROIN PAIN: ICD-10-CM

## 2025-04-17 DIAGNOSIS — R16.0 HEPATOMEGALY: ICD-10-CM

## 2025-04-17 DIAGNOSIS — R10.31 BILATERAL GROIN PAIN: ICD-10-CM

## 2025-04-17 DIAGNOSIS — R10.9 CHRONIC ABDOMINAL PAIN: ICD-10-CM

## 2025-04-17 PROCEDURE — 74177 CT ABD & PELVIS W/CONTRAST: CPT

## 2025-04-17 RX ADMIN — IOHEXOL 50 ML: 350 INJECTION, SOLUTION INTRAVENOUS at 15:05

## 2025-04-23 ENCOUNTER — RESULTS FOLLOW-UP (OUTPATIENT)
Dept: FAMILY MEDICINE CLINIC | Facility: CLINIC | Age: 65
End: 2025-04-23

## 2025-04-23 DIAGNOSIS — R93.5 ABNORMAL CT OF THE ABDOMEN: Primary | ICD-10-CM

## 2025-04-23 DIAGNOSIS — K86.9 LESION OF PANCREAS: ICD-10-CM

## 2025-04-28 ENCOUNTER — HOSPITAL ENCOUNTER (OUTPATIENT)
Dept: ULTRASOUND IMAGING | Facility: HOSPITAL | Age: 65
Discharge: HOME | End: 2025-04-28
Attending: FAMILY MEDICINE
Payer: COMMERCIAL

## 2025-04-28 DIAGNOSIS — R16.0 HEPATOMEGALY: ICD-10-CM

## 2025-04-28 PROCEDURE — 76981 USE PARENCHYMA: CPT

## 2025-05-04 ENCOUNTER — RESULTS FOLLOW-UP (OUTPATIENT)
Dept: FAMILY MEDICINE CLINIC | Facility: CLINIC | Age: 65
End: 2025-05-04

## 2025-05-10 ENCOUNTER — HOSPITAL ENCOUNTER (OUTPATIENT)
Dept: RADIOLOGY | Facility: HOSPITAL | Age: 65
Discharge: HOME | End: 2025-05-10
Attending: FAMILY MEDICINE

## 2025-05-10 DIAGNOSIS — K86.9 LESION OF PANCREAS: ICD-10-CM

## 2025-05-10 DIAGNOSIS — R93.5 ABNORMAL CT OF THE ABDOMEN: ICD-10-CM

## 2025-05-13 LAB
LEFT EYE DIABETIC RETINOPATHY: NORMAL
RIGHT EYE DIABETIC RETINOPATHY: NORMAL

## 2025-05-15 ENCOUNTER — HOSPITAL ENCOUNTER (OUTPATIENT)
Dept: MRI IMAGING | Facility: HOSPITAL | Age: 65
End: 2025-05-15
Attending: FAMILY MEDICINE
Payer: COMMERCIAL

## 2025-05-15 DIAGNOSIS — K86.9 LESION OF PANCREAS: ICD-10-CM

## 2025-05-15 DIAGNOSIS — R93.5 ABNORMAL CT OF THE ABDOMEN: ICD-10-CM

## 2025-05-15 PROCEDURE — 74183 MRI ABD W/O CNTR FLWD CNTR: CPT

## 2025-05-15 PROCEDURE — A9585 GADOBUTROL INJECTION: HCPCS | Performed by: FAMILY MEDICINE

## 2025-05-15 RX ORDER — GADOBUTROL 604.72 MG/ML
9 INJECTION INTRAVENOUS
Status: COMPLETED | OUTPATIENT
Start: 2025-05-15 | End: 2025-05-15

## 2025-05-15 RX ADMIN — GADOBUTROL 9 ML: 604.72 INJECTION INTRAVENOUS at 08:41

## 2025-05-22 ENCOUNTER — RESULTS FOLLOW-UP (OUTPATIENT)
Dept: FAMILY MEDICINE CLINIC | Facility: CLINIC | Age: 65
End: 2025-05-22

## 2025-05-22 DIAGNOSIS — D49.0 INTRADUCTAL PAPILLARY MUCINOUS NEOPLASM OF PANCREAS: Primary | ICD-10-CM

## 2025-05-28 NOTE — TELEPHONE ENCOUNTER
----- Message from Nik Gutierrez MD sent at 5/22/2025 11:30 AM EDT -----  Please call patient with results.  I attempted to call patient to discuss results but there was no answer.  MRI shows numerous pancreatic cystic lesions without high risk stigmata or worrisome   features radiology is reporting this is relatively stable since CT of the chest 3/3/2017. Recommend next followup in 12 months. Preferred imaging modality: abdomen MRI and MRCP with and without   IV contrast.  I would like patient to establish with a gastroenterologist so that we can monitor this closely.  I will place a referral today.  Please have patient follow-up in office with any   questions  ----- Message -----  From: Interface, Radiology Results In  Sent: 5/21/2025   2:46 PM EDT  To: Nik Gutierrez MD

## 2025-05-28 NOTE — TELEPHONE ENCOUNTER
Patient called back and he mentioned he saw his results and wanted to let Dr Gutierrez know that he did schedule an appointment with gastro for October. He is also on the wait list there for Dr Warner since he did not have anything available until December for an appointment. Patient mentioned he's also been having groin pain in addition to his abdominal pain and wanted to know if Dr Gutierrez could place a referral for urology since he believes it may be urinary or testicular related.

## 2025-06-04 NOTE — TELEPHONE ENCOUNTER
Patient called back to request the Urology referral. Dawit would like PCP to put in that referral.

## 2025-06-04 NOTE — TELEPHONE ENCOUNTER
Spoke with patient, patient stated he will speak with the doctor on 6/5/25 at his spouse appt. Please advise.

## 2025-06-05 ENCOUNTER — OFFICE VISIT (OUTPATIENT)
Dept: FAMILY MEDICINE CLINIC | Facility: CLINIC | Age: 65
End: 2025-06-05
Payer: COMMERCIAL

## 2025-06-05 DIAGNOSIS — N40.1 BPH WITH ELEVATED PSA AND LOWER URINARY TRACT SYMPTOMS: ICD-10-CM

## 2025-06-05 DIAGNOSIS — D49.0 IPMN (INTRADUCTAL PAPILLARY MUCINOUS NEOPLASM): Primary | ICD-10-CM

## 2025-06-05 DIAGNOSIS — K40.21 BILATERAL RECURRENT INGUINAL HERNIA WITHOUT OBSTRUCTION OR GANGRENE: ICD-10-CM

## 2025-06-05 DIAGNOSIS — R97.20 BPH WITH ELEVATED PSA AND LOWER URINARY TRACT SYMPTOMS: ICD-10-CM

## 2025-06-05 PROCEDURE — 99214 OFFICE O/P EST MOD 30 MIN: CPT | Performed by: FAMILY MEDICINE

## 2025-06-05 PROCEDURE — G2211 COMPLEX E/M VISIT ADD ON: HCPCS | Performed by: FAMILY MEDICINE

## 2025-06-05 NOTE — PROGRESS NOTES
Name: Dawit Winchester      : 1960      MRN: [id number]  Encounter Provider: Nik Gutierrez MD  Encounter Date: 2025   Encounter department: [facility]  :  Assessment & Plan  IPMN (intraductal papillary mucinous neoplasm)         Bilateral recurrent inguinal hernia without obstruction or gangrene  Noted on CT scan.  Does have bilateral groin pain right worse than the left.  Not interested in further evaluation.  Pain comes and goes.       BPH with elevated PSA and lower urinary tract symptoms  Urgency but tolerable.  No straining or weak urinary stream.  Does not feel he is retaining any urine.  PSA has been normal.  Continue to monitor.  If symptoms worsen can trial Flomax.         Numerous pancreatic cystic lesions with a dominant 2.3 cm pancreatic tail cystic lesion without high risk stigmata or worrisome features which in retrospect is relatively stable since CT chest 3/3/2017. No main pancreatic duct dilatation. 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 Management recommendation: Followup 6 months twice, then every 12 months, if stable. Recommend next followup in 12 months. Preferred imaging modality: abdomen MRI and MRCP with and without   IV contrast, or triple phase abdomen CT with IV contrast, or abdomen MRI and MRCP without IV contrast.     Diffuse pancreatic parenchymal calcifications better seen on CT 2025, likely due to sequela of chronic pancreatitis.     Moderate hepatic steatosis.     Two arterially enhancing liver lesions with persistent enhancement on delayed postcontrast imaging measuring up to 1.4 cm, likely vascular in etiology. Recommend attention on follow-up study.     History of Present Illness   Ongoing groin and testicular pain. Noted to have small inguinal hernias on CT abd. Denies any pain with defication or urination.         Review of Systems   Musculoskeletal:         Bilateral groin pain           Objective   There were no vitals taken for this visit.     Physical Exam

## 2025-07-03 DIAGNOSIS — Z79.4 TYPE 2 DIABETES MELLITUS WITHOUT COMPLICATION, WITH LONG-TERM CURRENT USE OF INSULIN (HCC): ICD-10-CM

## 2025-07-03 DIAGNOSIS — E11.9 TYPE 2 DIABETES MELLITUS WITHOUT COMPLICATION, WITH LONG-TERM CURRENT USE OF INSULIN (HCC): ICD-10-CM

## 2025-07-03 RX ORDER — BLOOD SUGAR DIAGNOSTIC
STRIP MISCELLANEOUS
Qty: 100 STRIP | Refills: 1 | Status: SHIPPED | OUTPATIENT
Start: 2025-07-03

## 2025-07-23 ENCOUNTER — TELEPHONE (OUTPATIENT)
Dept: GASTROENTEROLOGY | Facility: AMBULARY SURGERY CENTER | Age: 65
End: 2025-07-23

## 2025-07-23 ENCOUNTER — TELEPHONE (OUTPATIENT)
Age: 65
End: 2025-07-23

## 2025-07-23 ENCOUNTER — PREP FOR PROCEDURE (OUTPATIENT)
Dept: GASTROENTEROLOGY | Facility: AMBULARY SURGERY CENTER | Age: 65
End: 2025-07-23

## 2025-07-23 ENCOUNTER — OFFICE VISIT (OUTPATIENT)
Dept: GASTROENTEROLOGY | Facility: AMBULARY SURGERY CENTER | Age: 65
End: 2025-07-23
Attending: FAMILY MEDICINE
Payer: COMMERCIAL

## 2025-07-23 VITALS
DIASTOLIC BLOOD PRESSURE: 86 MMHG | BODY MASS INDEX: 29.56 KG/M2 | HEART RATE: 106 BPM | HEIGHT: 69 IN | WEIGHT: 199.6 LBS | SYSTOLIC BLOOD PRESSURE: 138 MMHG | OXYGEN SATURATION: 96 %

## 2025-07-23 DIAGNOSIS — K86.1 CHRONIC PANCREATITIS, UNSPECIFIED PANCREATITIS TYPE (HCC): ICD-10-CM

## 2025-07-23 DIAGNOSIS — K76.9 LIVER LESION: ICD-10-CM

## 2025-07-23 DIAGNOSIS — K86.2 PANCREATIC CYST: Primary | ICD-10-CM

## 2025-07-23 DIAGNOSIS — D49.0 INTRADUCTAL PAPILLARY MUCINOUS NEOPLASM OF PANCREAS: ICD-10-CM

## 2025-07-23 DIAGNOSIS — R19.8 IRREGULAR BOWEL HABITS: ICD-10-CM

## 2025-07-23 DIAGNOSIS — Z80.0 FAMILY HISTORY OF PANCREATIC CANCER: ICD-10-CM

## 2025-07-23 PROCEDURE — 99204 OFFICE O/P NEW MOD 45 MIN: CPT | Performed by: PHYSICIAN ASSISTANT

## 2025-07-23 RX ORDER — SODIUM CHLORIDE, SODIUM LACTATE, POTASSIUM CHLORIDE, CALCIUM CHLORIDE 600; 310; 30; 20 MG/100ML; MG/100ML; MG/100ML; MG/100ML
125 INJECTION, SOLUTION INTRAVENOUS CONTINUOUS
OUTPATIENT
Start: 2025-07-23

## 2025-07-23 NOTE — TELEPHONE ENCOUNTER
Procedure: EUS with FNA & Cytology  Scheduled date of procedure (as of today):10/29/25  Physician performing procedure:Dr. Warner  [facility] of procedure:[facility]  Instructions reviewed with patient by: ti  Clearances: n/a

## 2025-07-23 NOTE — LETTER
2025     Nik Gutierrez MD  [facility] [id number]    Patient: Dawit Winchester   YOB: 1960   Date of Visit: 2025       Dear Dr. Nik Gutierrez MD:    Thank you for referring Dawit Winchester to me for evaluation. Below are my notes for this consultation.    If you have questions, please do not hesitate to call me. I look forward to following your patient along with you.         Sincerely,        Jamila Dillard PA-C        CC: No Recipients    Jamila Dillard PA-C  2025  9:35 AM  Signed  Name: Dawit Winchester      : 1960      MRN: [id number]  Encounter Provider: Jamila Dillard PA-C  Encounter Date: 2025   Encounter department: [facility] [place]  :  Assessment & Plan  Pancreatic cyst  Family history of pancreatic cancer  Intraductal papillary mucinous neoplasm of pancreas  MRI/MRCP 2025 showing signs of chronic pancreatitis along with a dominant pancreatic cyst measuring 2.3 cm in the pancreatic tail with numerous subcentimeter cysts which communicates with the main pancreatic duct.  Pancreatic duct is normal.  No worrisome features. No prior imaging to compare. Repeat MRI was recommended in 6 months.  He reports 50-year history of tobacco abuse, quit 2 years ago.  Family history of pancreatic cancer in his brother diagnosed in his late 40s.    -Due to high risk in the setting of family history of pancreatic cancer, I believe it would be reasonable to pursue endoscopic ultrasound.  I will discuss with Dr. Warner first prior to scheduling this.  - Other option would be repeat MRI in 3 to 6 months.  - Advised patient I will reach out to him after speaking with Dr. Warner for an update. Message sent.    -In the meantime, we discussed endoscopic ultrasound procedure.  We discussed risks including bleeding, infection, perforation as well as pancreatitis    -We discussed pending results of follow-up, would likely  monitor closely in the future with MRIs and specific intervals.    - Patient quit smoking 2 years ago.  Continue alcohol and tobacco avoidance.    Chronic pancreatitis, unspecified pancreatitis type (HCC)  MRI notable for diffuse pancreatic calcifications in the setting of chronic pancreatitis.  He does have a longstanding history of tobacco abuse as well as type 2 diabetes.  He denies any history of pancreatitis.  He reports some occasional looser stools however this is intermittent.  No classic stearrhea.    -Patient will continue to monitor bowel movements.  If any development of stearrhea he will reach out and we will plan for pancreatic elastase testing at that time  -Further monitoring with EUS/MRI as noted above       Liver lesion  MRI was also notable for 2 arterial enhancing liver lesions suspected vascular in etiology however follow-up recommended.    - Will follow-up on MRI as noted above.       Irregular bowel habits  Patient reports alternations between looser stools and urgency as well as having some urgency but not being on the past a bowel movement.  Up-to-date on colonoscopy last year.    - Recommend starting a fiber supplement such as Metamucil or Benefiber 1 tablespoon daily.  - She will monitor for any development of chronic diarrhea/steatorrhea to consider pancreatic elastase testing in the setting of chronic pancreatitis on imaging.           History of Present Illness  Dawit Winchester is a pleasant 65 y.o. male who presents hypertension, type 2 diabetes, AZAR, IPMN, partial splenectomy who presents for follow-up.    Patient had CT scan by PCP for abdominal pain.  There was found to be calcifications and low-density structure centered between the pancreatic tail and spleen measuring 2.5 cm unsure for surgical versus a pancreatic tail lesion.  There is also calcifications throughout the pancreas compatible with chronic pancreatitis.  MRI/MRCP was then performed and completed 5/15/2025.  This was  "notable for numerous pancreatic cystic lesions with the largest measuring 2.3 cm in the tail without worrisome features noted.  Pancreatic duct normal.    Patient reports doing relatively well.  He does report some intermittent abdominal pain which she believes has been going on for years.  Sometimes in the left side of the abdomen versus periumbilical.  He does notice alternation of bowel movements sometimes looser but denies any oily or greasy stools.  He reports sometimes he has urgency and is not able to pass a bowel movement.  He does not take anything for this.  Possibly food related. No unintentional weight loss.    He does report his brother was diagnosed with pancreatic cancer in his late 40s.    Previous significant tobacco use for 50 years.  He quit 2 years ago.  He denies any significant alcohol abuse.  He denies any history of pancreatitis.    Lab work with normal liver enzymes, hemoglobin and platelets.  Ultrasound elastography showing F0 to F1 fibrosis score with steatosis of S3.    Patient had colonoscopy 12/2024 with diverticulosis, hemorrhoids and repeat recommended in 7 years due to previous history of polyps.    HPI    Review of Systems   All other systems reviewed and are negative.   A complete review of systems is negative other than that noted above in the HPI.      Current Medications[1]  Objective  /86 (BP Location: Left arm, Patient Position: Sitting, Cuff Size: Standard)   Pulse (!) 106   Ht 5' 9\" (1.753 m)   Wt 90.5 kg (199 lb 9.6 oz)   SpO2 96%   BMI 29.48 kg/m²     Physical Exam  Vitals reviewed.   Constitutional:       General: He is not in acute distress.     Appearance: Normal appearance. He is not ill-appearing.   HENT:      Head: Normocephalic and atraumatic.     Eyes:      Conjunctiva/sclera: Conjunctivae normal.       Cardiovascular:      Rate and Rhythm: Normal rate and regular rhythm.      Heart sounds: No murmur heard.  Pulmonary:      Effort: Pulmonary effort is " normal. No respiratory distress.      Breath sounds: Normal breath sounds.   Abdominal:      General: Abdomen is flat. There is no distension.      Palpations: Abdomen is soft.      Tenderness: There is no abdominal tenderness. There is no guarding or rebound.     Musculoskeletal:         General: No swelling.      Cervical back: Normal range of motion.      Right lower leg: No edema.      Left lower leg: No edema.     Skin:     General: Skin is warm.      Coloration: Skin is not jaundiced.     Neurological:      General: No focal deficit present.      Mental Status: He is alert and oriented to person, place, and time. Mental status is at baseline.     Psychiatric:         Mood and Affect: Mood normal.         Behavior: Behavior normal.            Lab Results: I personally reviewed relevant lab results.       Results for orders placed during the hospital encounter of 12/10/24    Colonoscopy    Impression  Mild sigmoid diverticulosis and internal hemorrhoids  Otherwise normal colonoscopy to good prep and good visualization    RECOMMENDATION:  Repeat colonoscopy in 7 years, due: 12/9/2031  Personal history of colon polyps    Discharge home  Resume regular diet  Resume home medications  Maintain high-fiber diet  Call with any abdominal pain, bleeding, fevers            Armando Warner MD               [1]   Current Outpatient Medications   Medication Sig Dispense Refill   • amLODIPine (NORVASC) 5 mg tablet Take 2 tablets (10 mg total) by mouth daily     • atorvastatin (LIPITOR) 20 mg tablet Take 1 tablet (20 mg total) by mouth every evening 100 tablet 1   • Empagliflozin (Jardiance) 25 MG TABS Take 1 tablet (25 mg total) by mouth daily 90 tablet 1   • glimepiride (AMARYL) 4 mg tablet Take 1 tablet (4 mg total) by mouth daily with breakfast 90 tablet 1   • glucose blood (OneTouch Verio) test strip Use as instructed 100 strip 1   • lisinopril (ZESTRIL) 40 mg tablet Take 1 tablet (40 mg total) by mouth daily 90 tablet 1   •  metFORMIN (GLUCOPHAGE) 1000 MG tablet Take 1 tablet (1,000 mg total) by mouth 2 (two) times a day 180 tablet 1   • omeprazole (PriLOSEC OTC) 20 MG tablet Take 20 mg by mouth 4 (four) times a week     • nystatin (MYCOSTATIN) 500,000 units/5 mL suspension Apply 5 mL (500,000 Units total) to the mouth or throat 4 (four) times a day (Patient not taking: Reported on 2/10/2025) 473 mL 1   • nystatin (MYCOSTATIN) powder APPLY TOPICALLY 4 TIMES A DAY. (Patient not taking: No sig reported) 60 g 1     No current facility-administered medications for this visit.

## 2025-07-23 NOTE — PROGRESS NOTES
Name: Dawit Winchester      : 1960      MRN: [id number]  Encounter Provider: Jamila Dillard PA-C  Encounter Date: 2025   Encounter department: [facility] [place]  :  Assessment & Plan  Pancreatic cyst  Family history of pancreatic cancer  Intraductal papillary mucinous neoplasm of pancreas  MRI/MRCP 2025 showing signs of chronic pancreatitis along with a dominant pancreatic cyst measuring 2.3 cm in the pancreatic tail with numerous subcentimeter cysts which communicates with the main pancreatic duct.  Pancreatic duct is normal.  No worrisome features. No prior imaging to compare. Repeat MRI was recommended in 6 months.  He reports 50-year history of tobacco abuse, quit 2 years ago.  Family history of pancreatic cancer in his brother diagnosed in his late 40s.    -Due to high risk in the setting of family history of pancreatic cancer, I believe it would be reasonable to pursue endoscopic ultrasound.  I will discuss with Dr. Warner first prior to scheduling this.  - Other option would be repeat MRI in 3 to 6 months.  - Advised patient I will reach out to him after speaking with Dr. Warner for an update. Message sent.    -In the meantime, we discussed endoscopic ultrasound procedure.  We discussed risks including bleeding, infection, perforation as well as pancreatitis    -We discussed pending results of follow-up, would likely monitor closely in the future with MRIs and specific intervals.    - Patient quit smoking 2 years ago.  Continue alcohol and tobacco avoidance.    Chronic pancreatitis, unspecified pancreatitis type (HCC)  MRI notable for diffuse pancreatic calcifications in the setting of chronic pancreatitis.  He does have a longstanding history of tobacco abuse as well as type 2 diabetes.  He denies any history of pancreatitis.  He reports some occasional looser stools however this is intermittent.  No classic stearrhea.    -Patient will continue to monitor bowel  movements.  If any development of stearrhea he will reach out and we will plan for pancreatic elastase testing at that time  -Further monitoring with EUS/MRI as noted above       Liver lesion  MRI was also notable for 2 arterial enhancing liver lesions suspected vascular in etiology however follow-up recommended.    - Will follow-up on MRI as noted above.       Irregular bowel habits  Patient reports alternations between looser stools and urgency as well as having some urgency but not being on the past a bowel movement.  Up-to-date on colonoscopy last year.    - Recommend starting a fiber supplement such as Metamucil or Benefiber 1 tablespoon daily.  - She will monitor for any development of chronic diarrhea/steatorrhea to consider pancreatic elastase testing in the setting of chronic pancreatitis on imaging.           History of Present Illness   Dawit Winchester is a pleasant 65 y.o. male who presents hypertension, type 2 diabetes, AZAR, IPMN, partial splenectomy who presents for follow-up.    Patient had CT scan by PCP for abdominal pain.  There was found to be calcifications and low-density structure centered between the pancreatic tail and spleen measuring 2.5 cm unsure for surgical versus a pancreatic tail lesion.  There is also calcifications throughout the pancreas compatible with chronic pancreatitis.  MRI/MRCP was then performed and completed 5/15/2025.  This was notable for numerous pancreatic cystic lesions with the largest measuring 2.3 cm in the tail without worrisome features noted.  Pancreatic duct normal.    Patient reports doing relatively well.  He does report some intermittent abdominal pain which she believes has been going on for years.  Sometimes in the left side of the abdomen versus periumbilical.  He does notice alternation of bowel movements sometimes looser but denies any oily or greasy stools.  He reports sometimes he has urgency and is not able to pass a bowel movement.  He does not take  "anything for this.  Possibly food related. No unintentional weight loss.    He does report his brother was diagnosed with pancreatic cancer in his late 40s.    Previous significant tobacco use for 50 years.  He quit 2 years ago.  He denies any significant alcohol abuse.  He denies any history of pancreatitis.    Lab work with normal liver enzymes, hemoglobin and platelets.  Ultrasound elastography showing F0 to F1 fibrosis score with steatosis of S3.    Patient had colonoscopy 12/2024 with diverticulosis, hemorrhoids and repeat recommended in 7 years due to previous history of polyps.    HPI    Review of Systems   All other systems reviewed and are negative.   A complete review of systems is negative other than that noted above in the HPI.      Current Medications[1]  Objective   /86 (BP Location: Left arm, Patient Position: Sitting, Cuff Size: Standard)   Pulse (!) 106   Ht 5' 9\" (1.753 m)   Wt 90.5 kg (199 lb 9.6 oz)   SpO2 96%   BMI 29.48 kg/m²     Physical Exam  Vitals reviewed.   Constitutional:       General: He is not in acute distress.     Appearance: Normal appearance. He is not ill-appearing.   HENT:      Head: Normocephalic and atraumatic.     Eyes:      Conjunctiva/sclera: Conjunctivae normal.       Cardiovascular:      Rate and Rhythm: Normal rate and regular rhythm.      Heart sounds: No murmur heard.  Pulmonary:      Effort: Pulmonary effort is normal. No respiratory distress.      Breath sounds: Normal breath sounds.   Abdominal:      General: Abdomen is flat. There is no distension.      Palpations: Abdomen is soft.      Tenderness: There is no abdominal tenderness. There is no guarding or rebound.     Musculoskeletal:         General: No swelling.      Cervical back: Normal range of motion.      Right lower leg: No edema.      Left lower leg: No edema.     Skin:     General: Skin is warm.      Coloration: Skin is not jaundiced.     Neurological:      General: No focal deficit present.    "   Mental Status: He is alert and oriented to person, place, and time. Mental status is at baseline.     Psychiatric:         Mood and Affect: Mood normal.         Behavior: Behavior normal.            Lab Results: I personally reviewed relevant lab results.       Results for orders placed during the hospital encounter of 12/10/24    Colonoscopy    Impression  Mild sigmoid diverticulosis and internal hemorrhoids  Otherwise normal colonoscopy to good prep and good visualization    RECOMMENDATION:  Repeat colonoscopy in 7 years, due: 12/9/2031  Personal history of colon polyps    Discharge home  Resume regular diet  Resume home medications  Maintain high-fiber diet  Call with any abdominal pain, bleeding, fevers            Armando Warner MD               [1]   Current Outpatient Medications   Medication Sig Dispense Refill    amLODIPine (NORVASC) 5 mg tablet Take 2 tablets (10 mg total) by mouth daily      atorvastatin (LIPITOR) 20 mg tablet Take 1 tablet (20 mg total) by mouth every evening 100 tablet 1    Empagliflozin (Jardiance) 25 MG TABS Take 1 tablet (25 mg total) by mouth daily 90 tablet 1    glimepiride (AMARYL) 4 mg tablet Take 1 tablet (4 mg total) by mouth daily with breakfast 90 tablet 1    glucose blood (OneTouch Verio) test strip Use as instructed 100 strip 1    lisinopril (ZESTRIL) 40 mg tablet Take 1 tablet (40 mg total) by mouth daily 90 tablet 1    metFORMIN (GLUCOPHAGE) 1000 MG tablet Take 1 tablet (1,000 mg total) by mouth 2 (two) times a day 180 tablet 1    omeprazole (PriLOSEC OTC) 20 MG tablet Take 20 mg by mouth 4 (four) times a week      nystatin (MYCOSTATIN) 500,000 units/5 mL suspension Apply 5 mL (500,000 Units total) to the mouth or throat 4 (four) times a day (Patient not taking: Reported on 2/10/2025) 473 mL 1    nystatin (MYCOSTATIN) powder APPLY TOPICALLY 4 TIMES A DAY. (Patient not taking: No sig reported) 60 g 1     No current facility-administered medications for this visit.

## 2025-07-23 NOTE — TELEPHONE ENCOUNTER
Discussed with Dr. Warner.  He agrees with pursuing EUS.  Called and spoke to patient.  He is agreeable to proceed.  Message sent to  to get this set up.

## 2025-07-23 NOTE — ASSESSMENT & PLAN NOTE
MRI/MRCP 5/2025 showing signs of chronic pancreatitis along with a dominant pancreatic cyst measuring 2.3 cm in the pancreatic tail with numerous subcentimeter cysts which communicates with the main pancreatic duct.  Pancreatic duct is normal.  No worrisome features. No prior imaging to compare. Repeat MRI was recommended in 6 months.  He reports 50-year history of tobacco abuse, quit 2 years ago.  Family history of pancreatic cancer in his brother diagnosed in his late 40s.    -Due to high risk in the setting of family history of pancreatic cancer, I believe it would be reasonable to pursue endoscopic ultrasound.  I will discuss with Dr. Warner first prior to scheduling this.  - Other option would be repeat MRI in 3 to 6 months.  - Advised patient I will reach out to him after speaking with Dr. Warner for an update. Message sent.    -In the meantime, we discussed endoscopic ultrasound procedure.  We discussed risks including bleeding, infection, perforation as well as pancreatitis    -We discussed pending results of follow-up, would likely monitor closely in the future with MRIs and specific intervals.    - Patient quit smoking 2 years ago.  Continue alcohol and tobacco avoidance.

## 2025-07-23 NOTE — TELEPHONE ENCOUNTER
----- Message from Jamila Dillard PA-C sent at 7/23/2025 12:43 PM EDT -----  I called and spoke to patient.  He is aware that Dr. Warner agrees with pursuing EUS.  Order placed.  Please call patient to help him schedule this. Thank you!  ----- Message -----  From: Armando Warner MD  Sent: 7/23/2025  10:32 AM EDT  To: Jamila Dillard PA-C    I agree good idea to proceed with EUS.  ----- Message -----  From: Jamila Dillard PA-C  Sent: 7/23/2025   9:35 AM EDT  To: Armando Warner MD    HI Dr. Warner,    This is a patient with MRI showing 2.3 cm pancreatic tail cyst with numerous other subcentimeter cysts with communication of the pancreatic duct though pancreatic duct is normal size.  No other worrisome features.  He has 50-year tobacco abuse history, quit 2 years ago.  He also has family history of pancreatic cancer in his brother who was diagnosed in his late 40s.    Due to his higher risk  in the setting of family history, I felt endoscopic ultrasound may be warranted first instead of repeat MRI.  If you agree I can get this set up for him.    Thanks!

## 2025-08-05 ENCOUNTER — APPOINTMENT (OUTPATIENT)
Dept: LAB | Facility: HOSPITAL | Age: 65
End: 2025-08-05
Attending: INTERNAL MEDICINE
Payer: COMMERCIAL

## 2025-08-05 ENCOUNTER — TELEPHONE (OUTPATIENT)
Dept: NEPHROLOGY | Facility: CLINIC | Age: 65
End: 2025-08-05

## 2025-08-05 DIAGNOSIS — N18.2 TYPE 2 DIABETES MELLITUS WITH STAGE 2 CHRONIC KIDNEY DISEASE AND HYPERTENSION  (HCC): ICD-10-CM

## 2025-08-05 DIAGNOSIS — E11.22 TYPE 2 DIABETES MELLITUS WITH STAGE 2 CHRONIC KIDNEY DISEASE AND HYPERTENSION  (HCC): ICD-10-CM

## 2025-08-05 DIAGNOSIS — E11.29 MICROALBUMINURIA DUE TO TYPE 2 DIABETES MELLITUS  (HCC): ICD-10-CM

## 2025-08-05 DIAGNOSIS — I12.9 TYPE 2 DIABETES MELLITUS WITH STAGE 2 CHRONIC KIDNEY DISEASE AND HYPERTENSION  (HCC): ICD-10-CM

## 2025-08-05 DIAGNOSIS — I10 ESSENTIAL HYPERTENSION: ICD-10-CM

## 2025-08-05 DIAGNOSIS — R80.9 MICROALBUMINURIA DUE TO TYPE 2 DIABETES MELLITUS  (HCC): ICD-10-CM

## 2025-08-05 LAB
ALBUMIN SERPL BCG-MCNC: 4.5 G/DL (ref 3.5–5)
ALP SERPL-CCNC: 65 U/L (ref 34–104)
ALT SERPL W P-5'-P-CCNC: 34 U/L (ref 7–52)
ANION GAP SERPL CALCULATED.3IONS-SCNC: 11 MMOL/L (ref 4–13)
AST SERPL W P-5'-P-CCNC: 32 U/L (ref 13–39)
BILIRUB SERPL-MCNC: 0.72 MG/DL (ref 0.2–1)
BUN SERPL-MCNC: 11 MG/DL (ref 5–25)
CALCIUM SERPL-MCNC: 9.2 MG/DL (ref 8.4–10.2)
CHLORIDE SERPL-SCNC: 101 MMOL/L (ref 96–108)
CO2 SERPL-SCNC: 27 MMOL/L (ref 21–32)
CREAT SERPL-MCNC: 1.08 MG/DL (ref 0.6–1.3)
CREAT UR-MCNC: 57.4 MG/DL
ERYTHROCYTE [DISTWIDTH] IN BLOOD BY AUTOMATED COUNT: 15 % (ref 11.6–15.1)
EST. AVERAGE GLUCOSE BLD GHB EST-MCNC: 192 MG/DL
GFR SERPL CREATININE-BSD FRML MDRD: 70 ML/MIN/1.73SQ M
GLUCOSE P FAST SERPL-MCNC: 139 MG/DL (ref 65–99)
HBA1C MFR BLD: 8.3 %
HCT VFR BLD AUTO: 43.3 % (ref 36.5–49.3)
HGB BLD-MCNC: 14.5 G/DL (ref 12–17)
MCH RBC QN AUTO: 28.3 PG (ref 26.8–34.3)
MCHC RBC AUTO-ENTMCNC: 33.5 G/DL (ref 31.4–37.4)
MCV RBC AUTO: 84 FL (ref 82–98)
MICROALBUMIN UR-MCNC: 625.8 MG/L
MICROALBUMIN/CREAT 24H UR: 1090 MG/G CREATININE (ref 0–30)
PLATELET # BLD AUTO: 279 THOUSANDS/UL (ref 149–390)
PMV BLD AUTO: 10 FL (ref 8.9–12.7)
POTASSIUM SERPL-SCNC: 3.6 MMOL/L (ref 3.5–5.3)
PROT SERPL-MCNC: 7.6 G/DL (ref 6.4–8.4)
RBC # BLD AUTO: 5.13 MILLION/UL (ref 3.88–5.62)
SODIUM SERPL-SCNC: 139 MMOL/L (ref 135–147)
WBC # BLD AUTO: 7.31 THOUSAND/UL (ref 4.31–10.16)

## 2025-08-05 PROCEDURE — 36415 COLL VENOUS BLD VENIPUNCTURE: CPT

## 2025-08-05 PROCEDURE — 80053 COMPREHEN METABOLIC PANEL: CPT

## 2025-08-05 PROCEDURE — 83036 HEMOGLOBIN GLYCOSYLATED A1C: CPT

## 2025-08-05 PROCEDURE — 85027 COMPLETE CBC AUTOMATED: CPT

## 2025-08-05 PROCEDURE — 82570 ASSAY OF URINE CREATININE: CPT

## 2025-08-05 PROCEDURE — 82043 UR ALBUMIN QUANTITATIVE: CPT

## (undated) DEVICE — ABDOMINAL PAD: Brand: DERMACEA

## (undated) DEVICE — CHLORAPREP HI-LITE 26ML ORANGE

## (undated) DEVICE — OCCLUSIVE GAUZE STRIP,3% BISMUTH TRIBROMOPHENATE IN PETROLATUM BLEND: Brand: XEROFORM

## (undated) DEVICE — SPONGE GAUZE 4 X 8 12 PLY STRL LF

## (undated) DEVICE — GLOVE INDICATOR PI UNDERGLOVE SZ 6.5 BLUE

## (undated) DEVICE — SUT VICRYL 2-0 SH 27 IN UNDYED J417H

## (undated) DEVICE — TIBURON EXTREMITY SHEET: Brand: CONVERTORS

## (undated) DEVICE — DRAIN JACKSON PRATT 10FR 1/8END: Brand: CARDINAL HEALTH

## (undated) DEVICE — NEEDLE 25G X 1 1/2

## (undated) DEVICE — 3M™ TEGADERM™ TRANSPARENT FILM DRESSING FRAME STYLE, 1626W, 4 IN X 4-3/4 IN (10 CM X 12 CM), 50/CT 4CT/CASE: Brand: 3M™ TEGADERM™

## (undated) DEVICE — PLUMEPEN PRO 10FT

## (undated) DEVICE — SUT ETHILON 3-0 FSL 30 IN 1671H

## (undated) DEVICE — VAPR PREMIERE50 ELECTRODE WITH HAND CONTROLS 3.0MM, 50 DEGREES SUCTION WITH INTEGRATED HANDPIECE: Brand: VAPR

## (undated) DEVICE — PAD GROUNDING ADULT

## (undated) DEVICE — GLOVE SRG BIOGEL 6.5

## (undated) DEVICE — SUT MONOCRYL 4-0 PS-2 18 IN Y496G

## (undated) DEVICE — GLOVE INDICATOR PI UNDERGLOVE SZ 7.5 BLUE

## (undated) DEVICE — JACKSON-PRATT 100CC BULB RESERVOIR: Brand: CARDINAL HEALTH

## (undated) DEVICE — SURGICEL 2 X 3

## (undated) DEVICE — GLOVE SRG BIOGEL 7.5

## (undated) DEVICE — MEDI-VAC YANK SUCT HNDL W/TPRD BULBOUS TIP: Brand: CARDINAL HEALTH

## (undated) DEVICE — TIBURON SPLIT SHEET: Brand: CONVERTORS

## (undated) DEVICE — FABRIC REINFORCED, SURGICAL GOWN, XL: Brand: CONVERTORS

## (undated) DEVICE — GLOVE INDICATOR PI UNDERGLOVE SZ 8 BLUE

## (undated) DEVICE — ACE WRAP 6 IN XL STERILE

## (undated) DEVICE — TUBING ARTHROSCOPIC WAVE  MAIN PUMP

## (undated) DEVICE — INTENDED FOR TISSUE SEPARATION, AND OTHER PROCEDURES THAT REQUIRE A SHARP SURGICAL BLADE TO PUNCTURE OR CUT.: Brand: BARD-PARKER SAFETY BLADES SIZE 11, STERILE

## (undated) DEVICE — BETHLEHEM UNIVERSAL MINOR GEN: Brand: CARDINAL HEALTH

## (undated) DEVICE — PACK ARTHROSCOPY

## (undated) DEVICE — INTENDED FOR TISSUE SEPARATION, AND OTHER PROCEDURES THAT REQUIRE A SHARP SURGICAL BLADE TO PUNCTURE OR CUT.: Brand: BARD-PARKER SAFETY BLADES SIZE 15, STERILE

## (undated) DEVICE — GLOVE SRG BIOGEL ECLIPSE 7.5